# Patient Record
Sex: MALE | Race: WHITE | Employment: UNEMPLOYED | URBAN - METROPOLITAN AREA
[De-identification: names, ages, dates, MRNs, and addresses within clinical notes are randomized per-mention and may not be internally consistent; named-entity substitution may affect disease eponyms.]

---

## 2020-01-01 ENCOUNTER — APPOINTMENT (OUTPATIENT)
Dept: GENERAL RADIOLOGY | Age: 35
DRG: 720 | End: 2020-01-01
Attending: INTERNAL MEDICINE
Payer: COMMERCIAL

## 2020-01-01 ENCOUNTER — HOSPITAL ENCOUNTER (INPATIENT)
Age: 35
LOS: 10 days | DRG: 720 | End: 2020-12-12
Attending: INTERNAL MEDICINE | Admitting: INTERNAL MEDICINE
Payer: COMMERCIAL

## 2020-01-01 ENCOUNTER — APPOINTMENT (OUTPATIENT)
Dept: ULTRASOUND IMAGING | Age: 35
DRG: 720 | End: 2020-01-01
Attending: INTERNAL MEDICINE
Payer: COMMERCIAL

## 2020-01-01 VITALS
SYSTOLIC BLOOD PRESSURE: 110 MMHG | WEIGHT: 315 LBS | OXYGEN SATURATION: 58 % | BODY MASS INDEX: 46.65 KG/M2 | TEMPERATURE: 98.1 F | DIASTOLIC BLOOD PRESSURE: 53 MMHG | HEIGHT: 69 IN

## 2020-01-01 LAB
-: NORMAL
ABSOLUTE EOS #: 0.35 K/UL (ref 0–0.4)
ABSOLUTE EOS #: 0.45 K/UL (ref 0–0.44)
ABSOLUTE EOS #: 0.47 K/UL (ref 0–0.44)
ABSOLUTE EOS #: 0.49 K/UL (ref 0–0.44)
ABSOLUTE EOS #: 0.52 K/UL (ref 0–0.44)
ABSOLUTE EOS #: 0.54 K/UL (ref 0–0.44)
ABSOLUTE EOS #: 0.63 K/UL (ref 0–0.44)
ABSOLUTE EOS #: 0.66 K/UL (ref 0–0.4)
ABSOLUTE EOS #: 0.66 K/UL (ref 0–0.44)
ABSOLUTE EOS #: 0.76 K/UL (ref 0–0.4)
ABSOLUTE EOS #: 1.15 K/UL (ref 0–0.4)
ABSOLUTE IMMATURE GRANULOCYTE: 0 K/UL (ref 0–0.3)
ABSOLUTE IMMATURE GRANULOCYTE: 0.07 K/UL (ref 0–0.3)
ABSOLUTE IMMATURE GRANULOCYTE: 0.07 K/UL (ref 0–0.3)
ABSOLUTE IMMATURE GRANULOCYTE: 0.09 K/UL (ref 0–0.3)
ABSOLUTE IMMATURE GRANULOCYTE: 0.1 K/UL (ref 0–0.3)
ABSOLUTE IMMATURE GRANULOCYTE: 0.11 K/UL (ref 0–0.3)
ABSOLUTE IMMATURE GRANULOCYTE: 0.13 K/UL (ref 0–0.3)
ABSOLUTE IMMATURE GRANULOCYTE: 0.16 K/UL (ref 0–0.3)
ABSOLUTE IMMATURE GRANULOCYTE: 0.33 K/UL (ref 0–0.3)
ABSOLUTE IMMATURE GRANULOCYTE: 0.35 K/UL (ref 0–0.3)
ABSOLUTE IMMATURE GRANULOCYTE: 0.39 K/UL (ref 0–0.3)
ABSOLUTE LYMPH #: 1.02 K/UL (ref 1.1–3.7)
ABSOLUTE LYMPH #: 1.05 K/UL (ref 1.1–3.7)
ABSOLUTE LYMPH #: 1.24 K/UL (ref 1.1–3.7)
ABSOLUTE LYMPH #: 1.3 K/UL (ref 1.1–3.7)
ABSOLUTE LYMPH #: 1.38 K/UL (ref 1–4.8)
ABSOLUTE LYMPH #: 1.46 K/UL (ref 1.1–3.7)
ABSOLUTE LYMPH #: 1.47 K/UL (ref 1.1–3.7)
ABSOLUTE LYMPH #: 1.51 K/UL (ref 1–4.8)
ABSOLUTE LYMPH #: 1.57 K/UL (ref 1.1–3.7)
ABSOLUTE LYMPH #: 1.61 K/UL (ref 1–4.8)
ABSOLUTE LYMPH #: 2.75 K/UL (ref 1–4.8)
ABSOLUTE MONO #: 0.35 K/UL (ref 0.1–0.8)
ABSOLUTE MONO #: 0.71 K/UL (ref 0.1–1.2)
ABSOLUTE MONO #: 0.73 K/UL (ref 0.1–1.2)
ABSOLUTE MONO #: 0.76 K/UL (ref 0.1–0.8)
ABSOLUTE MONO #: 0.79 K/UL (ref 0.1–0.8)
ABSOLUTE MONO #: 0.79 K/UL (ref 0.1–1.2)
ABSOLUTE MONO #: 0.89 K/UL (ref 0.1–1.2)
ABSOLUTE MONO #: 0.9 K/UL (ref 0.1–1.2)
ABSOLUTE MONO #: 0.97 K/UL (ref 0.1–1.2)
ABSOLUTE MONO #: 1.04 K/UL (ref 0.1–0.8)
ABSOLUTE MONO #: 1.09 K/UL (ref 0.1–1.2)
ADENOVIRUS PCR: NOT DETECTED
ALBUMIN SERPL-MCNC: 1.5 G/DL (ref 3.5–5.2)
ALBUMIN SERPL-MCNC: 1.7 G/DL (ref 3.5–5.2)
ALBUMIN SERPL-MCNC: 1.7 G/DL (ref 3.5–5.2)
ALBUMIN SERPL-MCNC: 1.8 G/DL (ref 3.5–5.2)
ALBUMIN SERPL-MCNC: 1.9 G/DL (ref 3.5–5.2)
ALBUMIN SERPL-MCNC: 2 G/DL (ref 3.5–5.2)
ALBUMIN SERPL-MCNC: 2.1 G/DL (ref 3.5–5.2)
ALBUMIN SERPL-MCNC: 2.3 G/DL (ref 3.5–5.2)
ALBUMIN SERPL-MCNC: 2.3 G/DL (ref 3.5–5.2)
ALBUMIN SERPL-MCNC: 2.4 G/DL (ref 3.5–5.2)
ALBUMIN/GLOBULIN RATIO: 0.2 (ref 1–2.5)
ALBUMIN/GLOBULIN RATIO: 0.3 (ref 1–2.5)
ALBUMIN/GLOBULIN RATIO: 0.4 (ref 1–2.5)
ALBUMIN/GLOBULIN RATIO: 0.4 (ref 1–2.5)
ALBUMIN/GLOBULIN RATIO: 0.5 (ref 1–2.5)
ALLEN TEST: ABNORMAL
ALLEN TEST: POSITIVE
ALP BLD-CCNC: 117 U/L (ref 40–129)
ALP BLD-CCNC: 129 U/L (ref 40–129)
ALP BLD-CCNC: 84 U/L (ref 40–129)
ALP BLD-CCNC: 89 U/L (ref 40–129)
ALP BLD-CCNC: 90 U/L (ref 40–129)
ALP BLD-CCNC: 91 U/L (ref 40–129)
ALP BLD-CCNC: 93 U/L (ref 40–129)
ALP BLD-CCNC: 93 U/L (ref 40–129)
ALP BLD-CCNC: 97 U/L (ref 40–129)
ALP BLD-CCNC: 99 U/L (ref 40–129)
ALT SERPL-CCNC: 28 U/L (ref 5–41)
ALT SERPL-CCNC: 30 U/L (ref 5–41)
ALT SERPL-CCNC: 31 U/L (ref 5–41)
ALT SERPL-CCNC: 36 U/L (ref 5–41)
ALT SERPL-CCNC: 36 U/L (ref 5–41)
ALT SERPL-CCNC: 41 U/L (ref 5–41)
ALT SERPL-CCNC: 50 U/L (ref 5–41)
ALT SERPL-CCNC: 53 U/L (ref 5–41)
ALT SERPL-CCNC: 63 U/L (ref 5–41)
ALT SERPL-CCNC: 65 U/L (ref 5–41)
AMORPHOUS: NORMAL
ANION GAP SERPL CALCULATED.3IONS-SCNC: 10 MMOL/L (ref 9–17)
ANION GAP SERPL CALCULATED.3IONS-SCNC: 11 MMOL/L (ref 9–17)
ANION GAP SERPL CALCULATED.3IONS-SCNC: 12 MMOL/L (ref 9–17)
ANION GAP SERPL CALCULATED.3IONS-SCNC: 13 MMOL/L (ref 9–17)
ANION GAP SERPL CALCULATED.3IONS-SCNC: 13 MMOL/L (ref 9–17)
ANION GAP SERPL CALCULATED.3IONS-SCNC: 14 MMOL/L (ref 9–17)
ANION GAP SERPL CALCULATED.3IONS-SCNC: 7 MMOL/L (ref 9–17)
AST SERPL-CCNC: 100 U/L
AST SERPL-CCNC: 33 U/L
AST SERPL-CCNC: 37 U/L
AST SERPL-CCNC: 37 U/L
AST SERPL-CCNC: 41 U/L
AST SERPL-CCNC: 43 U/L
AST SERPL-CCNC: 43 U/L
AST SERPL-CCNC: 51 U/L
AST SERPL-CCNC: 72 U/L
AST SERPL-CCNC: 73 U/L
BACTERIA: NORMAL
BASOPHILS # BLD: 0 % (ref 0–2)
BASOPHILS # BLD: 0 % (ref 0–2)
BASOPHILS # BLD: 1 % (ref 0–2)
BASOPHILS # BLD: 2 % (ref 0–2)
BASOPHILS ABSOLUTE: 0 K/UL (ref 0–0.2)
BASOPHILS ABSOLUTE: 0 K/UL (ref 0–0.2)
BASOPHILS ABSOLUTE: 0.06 K/UL (ref 0–0.2)
BASOPHILS ABSOLUTE: 0.09 K/UL (ref 0–0.2)
BASOPHILS ABSOLUTE: 0.1 K/UL (ref 0–0.2)
BASOPHILS ABSOLUTE: 0.11 K/UL (ref 0–0.2)
BASOPHILS ABSOLUTE: 0.12 K/UL (ref 0–0.2)
BASOPHILS ABSOLUTE: 0.16 K/UL (ref 0–0.2)
BASOPHILS ABSOLUTE: 0.16 K/UL (ref 0–0.2)
BASOPHILS ABSOLUTE: 0.19 K/UL (ref 0–0.2)
BASOPHILS ABSOLUTE: 0.26 K/UL (ref 0–0.2)
BILIRUB SERPL-MCNC: 0.34 MG/DL (ref 0.3–1.2)
BILIRUB SERPL-MCNC: 0.37 MG/DL (ref 0.3–1.2)
BILIRUB SERPL-MCNC: 0.39 MG/DL (ref 0.3–1.2)
BILIRUB SERPL-MCNC: 0.46 MG/DL (ref 0.3–1.2)
BILIRUB SERPL-MCNC: 0.47 MG/DL (ref 0.3–1.2)
BILIRUB SERPL-MCNC: 0.56 MG/DL (ref 0.3–1.2)
BILIRUB SERPL-MCNC: 0.89 MG/DL (ref 0.3–1.2)
BILIRUB SERPL-MCNC: 0.95 MG/DL (ref 0.3–1.2)
BILIRUB SERPL-MCNC: 1.11 MG/DL (ref 0.3–1.2)
BILIRUB SERPL-MCNC: 1.32 MG/DL (ref 0.3–1.2)
BILIRUBIN DIRECT: 0.18 MG/DL
BILIRUBIN DIRECT: 0.2 MG/DL
BILIRUBIN DIRECT: 0.2 MG/DL
BILIRUBIN DIRECT: 0.27 MG/DL
BILIRUBIN DIRECT: 0.29 MG/DL
BILIRUBIN DIRECT: 0.33 MG/DL
BILIRUBIN DIRECT: 0.65 MG/DL
BILIRUBIN DIRECT: 0.66 MG/DL
BILIRUBIN DIRECT: 0.9 MG/DL
BILIRUBIN DIRECT: 1.12 MG/DL
BILIRUBIN URINE: NEGATIVE
BILIRUBIN, INDIRECT: 0.14 MG/DL (ref 0–1)
BILIRUBIN, INDIRECT: 0.17 MG/DL (ref 0–1)
BILIRUBIN, INDIRECT: 0.19 MG/DL (ref 0–1)
BILIRUBIN, INDIRECT: 0.19 MG/DL (ref 0–1)
BILIRUBIN, INDIRECT: 0.2 MG/DL (ref 0–1)
BILIRUBIN, INDIRECT: 0.2 MG/DL (ref 0–1)
BILIRUBIN, INDIRECT: 0.21 MG/DL (ref 0–1)
BILIRUBIN, INDIRECT: 0.23 MG/DL (ref 0–1)
BILIRUBIN, INDIRECT: 0.23 MG/DL (ref 0–1)
BILIRUBIN, INDIRECT: 0.3 MG/DL (ref 0–1)
BNP INTERPRETATION: ABNORMAL
BORDETELLA PARAPERTUSSIS: NOT DETECTED
BORDETELLA PERTUSSIS PCR: NOT DETECTED
BUN BLDV-MCNC: 30 MG/DL (ref 6–20)
BUN BLDV-MCNC: 39 MG/DL (ref 6–20)
BUN BLDV-MCNC: 41 MG/DL (ref 6–20)
BUN BLDV-MCNC: 42 MG/DL (ref 6–20)
BUN BLDV-MCNC: 43 MG/DL (ref 6–20)
BUN BLDV-MCNC: 53 MG/DL (ref 6–20)
BUN BLDV-MCNC: 63 MG/DL (ref 6–20)
BUN BLDV-MCNC: 74 MG/DL (ref 6–20)
BUN BLDV-MCNC: 81 MG/DL (ref 6–20)
BUN BLDV-MCNC: 82 MG/DL (ref 6–20)
BUN BLDV-MCNC: 90 MG/DL (ref 6–20)
BUN/CREAT BLD: ABNORMAL (ref 9–20)
C-REACTIVE PROTEIN: 398.4 MG/L (ref 0–5)
C-REACTIVE PROTEIN: 474.2 MG/L (ref 0–5)
C-REACTIVE PROTEIN: 510 MG/L (ref 0–5)
CALCIUM SERPL-MCNC: 8.1 MG/DL (ref 8.6–10.4)
CALCIUM SERPL-MCNC: 8.2 MG/DL (ref 8.6–10.4)
CALCIUM SERPL-MCNC: 8.3 MG/DL (ref 8.6–10.4)
CALCIUM SERPL-MCNC: 8.4 MG/DL (ref 8.6–10.4)
CALCIUM SERPL-MCNC: 8.6 MG/DL (ref 8.6–10.4)
CALCIUM SERPL-MCNC: 8.7 MG/DL (ref 8.6–10.4)
CALCIUM SERPL-MCNC: 8.7 MG/DL (ref 8.6–10.4)
CARBOXYHEMOGLOBIN: 0.8 % (ref 0–5)
CASTS UA: NORMAL /LPF (ref 0–8)
CHLAMYDIA PNEUMONIAE BY PCR: NOT DETECTED
CHLORIDE BLD-SCNC: 100 MMOL/L (ref 98–107)
CHLORIDE BLD-SCNC: 101 MMOL/L (ref 98–107)
CHLORIDE BLD-SCNC: 103 MMOL/L (ref 98–107)
CHLORIDE BLD-SCNC: 104 MMOL/L (ref 98–107)
CHLORIDE BLD-SCNC: 105 MMOL/L (ref 98–107)
CHLORIDE BLD-SCNC: 107 MMOL/L (ref 98–107)
CHLORIDE BLD-SCNC: 108 MMOL/L (ref 98–107)
CHLORIDE BLD-SCNC: 109 MMOL/L (ref 98–107)
CHLORIDE BLD-SCNC: 98 MMOL/L (ref 98–107)
CHLORIDE BLD-SCNC: 99 MMOL/L (ref 98–107)
CHLORIDE BLD-SCNC: 99 MMOL/L (ref 98–107)
CO2: 25 MMOL/L (ref 20–31)
CO2: 28 MMOL/L (ref 20–31)
CO2: 29 MMOL/L (ref 20–31)
CO2: 29 MMOL/L (ref 20–31)
CO2: 30 MMOL/L (ref 20–31)
CO2: 32 MMOL/L (ref 20–31)
COLOR: ABNORMAL
COMMENT UA: ABNORMAL
CORONAVIRUS 229E PCR: NOT DETECTED
CORONAVIRUS HKU1 PCR: NOT DETECTED
CORONAVIRUS NL63 PCR: NOT DETECTED
CORONAVIRUS OC43 PCR: NOT DETECTED
CREAT SERPL-MCNC: 1.32 MG/DL (ref 0.7–1.2)
CREAT SERPL-MCNC: 1.37 MG/DL (ref 0.7–1.2)
CREAT SERPL-MCNC: 1.41 MG/DL (ref 0.7–1.2)
CREAT SERPL-MCNC: 1.51 MG/DL (ref 0.7–1.2)
CREAT SERPL-MCNC: 1.55 MG/DL (ref 0.7–1.2)
CREAT SERPL-MCNC: 1.61 MG/DL (ref 0.7–1.2)
CREAT SERPL-MCNC: 1.98 MG/DL (ref 0.7–1.2)
CREAT SERPL-MCNC: 2.04 MG/DL (ref 0.7–1.2)
CREAT SERPL-MCNC: 2.07 MG/DL (ref 0.7–1.2)
CREAT SERPL-MCNC: 2.07 MG/DL (ref 0.7–1.2)
CREAT SERPL-MCNC: 2.14 MG/DL (ref 0.7–1.2)
CREATININE URINE: 126.7 MG/DL (ref 39–259)
CRYSTALS, UA: NORMAL /HPF
CULTURE: ABNORMAL
CULTURE: NORMAL
DIFFERENTIAL TYPE: ABNORMAL
DIRECT EXAM: NORMAL
EKG ATRIAL RATE: 108 BPM
EKG P AXIS: 30 DEGREES
EKG P-R INTERVAL: 152 MS
EKG Q-T INTERVAL: 356 MS
EKG QRS DURATION: 104 MS
EKG QTC CALCULATION (BAZETT): 477 MS
EKG R AXIS: 37 DEGREES
EKG T AXIS: 16 DEGREES
EKG VENTRICULAR RATE: 108 BPM
EOSINOPHILS RELATIVE PERCENT: 10 % (ref 1–4)
EOSINOPHILS RELATIVE PERCENT: 3 % (ref 1–4)
EOSINOPHILS RELATIVE PERCENT: 3 % (ref 1–4)
EOSINOPHILS RELATIVE PERCENT: 4 % (ref 1–4)
EOSINOPHILS RELATIVE PERCENT: 4 % (ref 1–4)
EOSINOPHILS RELATIVE PERCENT: 5 % (ref 1–4)
EOSINOPHILS RELATIVE PERCENT: 7 % (ref 1–4)
EPITHELIAL CELLS UA: NORMAL /HPF (ref 0–5)
FIO2: 100
FIO2: 40
FIO2: 40
FIO2: 70
FIO2: 70
FIO2: ABNORMAL
GFR AFRICAN AMERICAN: 43 ML/MIN
GFR AFRICAN AMERICAN: 45 ML/MIN
GFR AFRICAN AMERICAN: 45 ML/MIN
GFR AFRICAN AMERICAN: 46 ML/MIN
GFR AFRICAN AMERICAN: 47 ML/MIN
GFR AFRICAN AMERICAN: >60 ML/MIN
GFR NON-AFRICAN AMERICAN: 36 ML/MIN
GFR NON-AFRICAN AMERICAN: 37 ML/MIN
GFR NON-AFRICAN AMERICAN: 37 ML/MIN
GFR NON-AFRICAN AMERICAN: 38 ML/MIN
GFR NON-AFRICAN AMERICAN: 39 ML/MIN
GFR NON-AFRICAN AMERICAN: 50 ML/MIN
GFR NON-AFRICAN AMERICAN: 52 ML/MIN
GFR NON-AFRICAN AMERICAN: 53 ML/MIN
GFR NON-AFRICAN AMERICAN: 58 ML/MIN
GFR NON-AFRICAN AMERICAN: 60 ML/MIN
GFR NON-AFRICAN AMERICAN: >60 ML/MIN
GFR SERPL CREATININE-BSD FRML MDRD: ABNORMAL ML/MIN/{1.73_M2}
GLOBULIN: ABNORMAL G/DL (ref 1.5–3.8)
GLUCOSE BLD-MCNC: 113 MG/DL (ref 70–99)
GLUCOSE BLD-MCNC: 118 MG/DL (ref 70–99)
GLUCOSE BLD-MCNC: 119 MG/DL (ref 74–100)
GLUCOSE BLD-MCNC: 123 MG/DL (ref 74–100)
GLUCOSE BLD-MCNC: 123 MG/DL (ref 75–110)
GLUCOSE BLD-MCNC: 124 MG/DL (ref 70–99)
GLUCOSE BLD-MCNC: 132 MG/DL (ref 74–100)
GLUCOSE BLD-MCNC: 137 MG/DL (ref 70–99)
GLUCOSE BLD-MCNC: 139 MG/DL (ref 70–99)
GLUCOSE BLD-MCNC: 143 MG/DL (ref 70–99)
GLUCOSE BLD-MCNC: 145 MG/DL (ref 70–99)
GLUCOSE BLD-MCNC: 149 MG/DL (ref 70–99)
GLUCOSE BLD-MCNC: 151 MG/DL (ref 70–99)
GLUCOSE BLD-MCNC: 152 MG/DL (ref 74–100)
GLUCOSE BLD-MCNC: 155 MG/DL (ref 70–99)
GLUCOSE BLD-MCNC: 160 MG/DL (ref 74–100)
GLUCOSE BLD-MCNC: 162 MG/DL (ref 74–100)
GLUCOSE BLD-MCNC: 85 MG/DL (ref 75–110)
GLUCOSE BLD-MCNC: 97 MG/DL (ref 70–99)
GLUCOSE URINE: NEGATIVE
HAV IGM SER IA-ACNC: NONREACTIVE
HCO3 VENOUS: 32.3 MMOL/L (ref 24–30)
HCO3 VENOUS: 35.9 MMOL/L (ref 22–29)
HCO3, MIXED: 34.5 MMOL/L (ref 23–29)
HCT VFR BLD CALC: 28.9 % (ref 40.7–50.3)
HCT VFR BLD CALC: 31.4 % (ref 40.7–50.3)
HCT VFR BLD CALC: 31.7 % (ref 40.7–50.3)
HCT VFR BLD CALC: 31.8 % (ref 40.7–50.3)
HCT VFR BLD CALC: 31.8 % (ref 40.7–50.3)
HCT VFR BLD CALC: 32 % (ref 40.7–50.3)
HCT VFR BLD CALC: 34.1 % (ref 40.7–50.3)
HCT VFR BLD CALC: 35.9 % (ref 40.7–50.3)
HCT VFR BLD CALC: 37.9 % (ref 40.7–50.3)
HCT VFR BLD CALC: 39 % (ref 40.7–50.3)
HCT VFR BLD CALC: 39.1 % (ref 40.7–50.3)
HCT VFR BLD CALC: 39.3 % (ref 40.7–50.3)
HEMOGLOBIN: 10.4 G/DL (ref 13–17)
HEMOGLOBIN: 11 G/DL (ref 13–17)
HEMOGLOBIN: 11 G/DL (ref 13–17)
HEMOGLOBIN: 11.4 G/DL (ref 13–17)
HEMOGLOBIN: 11.7 G/DL (ref 13–17)
HEMOGLOBIN: 8.5 G/DL (ref 13–17)
HEMOGLOBIN: 9.1 G/DL (ref 13–17)
HEMOGLOBIN: 9.2 G/DL (ref 13–17)
HEMOGLOBIN: 9.4 G/DL (ref 13–17)
HEMOGLOBIN: 9.5 G/DL (ref 13–17)
HEMOGLOBIN: 9.5 G/DL (ref 13–17)
HEMOGLOBIN: 9.7 G/DL (ref 13–17)
HEPATITIS B CORE IGM ANTIBODY: NONREACTIVE
HEPATITIS B SURFACE ANTIGEN: NONREACTIVE
HEPATITIS C ANTIBODY: NONREACTIVE
HUMAN METAPNEUMOVIRUS PCR: NOT DETECTED
IMMATURE GRANULOCYTES: 0 %
IMMATURE GRANULOCYTES: 1 %
IMMATURE GRANULOCYTES: 3 %
INFLUENZA A BY PCR: NOT DETECTED
INFLUENZA A H1 (2009) PCR: NORMAL
INFLUENZA A H1 PCR: NORMAL
INFLUENZA A H3 PCR: NORMAL
INFLUENZA B BY PCR: NOT DETECTED
KETONES, URINE: ABNORMAL
LACTIC ACID, WHOLE BLOOD: 0.8 MMOL/L (ref 0.7–2.1)
LACTIC ACID, WHOLE BLOOD: 0.9 MMOL/L (ref 0.7–2.1)
LACTIC ACID, WHOLE BLOOD: 1 MMOL/L (ref 0.7–2.1)
LACTIC ACID, WHOLE BLOOD: 1.1 MMOL/L (ref 0.7–2.1)
LACTIC ACID, WHOLE BLOOD: 1.2 MMOL/L (ref 0.7–2.1)
LACTIC ACID, WHOLE BLOOD: 1.2 MMOL/L (ref 0.7–2.1)
LACTIC ACID, WHOLE BLOOD: 1.5 MMOL/L (ref 0.7–2.1)
LACTIC ACID, WHOLE BLOOD: 1.6 MMOL/L (ref 0.7–2.1)
LACTIC ACID: NORMAL MMOL/L
LEGIONELLA PNEUMOPHILIA AG, URINE: NEGATIVE
LEUKOCYTE ESTERASE, URINE: ABNORMAL
LV EF: 45 %
LVEF MODALITY: NORMAL
LYMPHOCYTES # BLD: 10 % (ref 24–43)
LYMPHOCYTES # BLD: 11 % (ref 24–43)
LYMPHOCYTES # BLD: 11 % (ref 24–43)
LYMPHOCYTES # BLD: 12 % (ref 24–43)
LYMPHOCYTES # BLD: 12 % (ref 24–44)
LYMPHOCYTES # BLD: 13 % (ref 24–43)
LYMPHOCYTES # BLD: 14 % (ref 24–44)
LYMPHOCYTES # BLD: 14 % (ref 24–44)
LYMPHOCYTES # BLD: 21 % (ref 24–44)
LYMPHOCYTES # BLD: 9 % (ref 24–43)
LYMPHOCYTES # BLD: 9 % (ref 24–43)
Lab: ABNORMAL
Lab: ABNORMAL
Lab: NORMAL
MCH RBC QN AUTO: 28.1 PG (ref 25.2–33.5)
MCH RBC QN AUTO: 28.3 PG (ref 25.2–33.5)
MCH RBC QN AUTO: 28.3 PG (ref 25.2–33.5)
MCH RBC QN AUTO: 28.6 PG (ref 25.2–33.5)
MCH RBC QN AUTO: 28.7 PG (ref 25.2–33.5)
MCH RBC QN AUTO: 28.8 PG (ref 25.2–33.5)
MCH RBC QN AUTO: 28.9 PG (ref 25.2–33.5)
MCH RBC QN AUTO: 29 PG (ref 25.2–33.5)
MCH RBC QN AUTO: 29.2 PG (ref 25.2–33.5)
MCH RBC QN AUTO: 29.3 PG (ref 25.2–33.5)
MCHC RBC AUTO-ENTMCNC: 28 G/DL (ref 28.4–34.8)
MCHC RBC AUTO-ENTMCNC: 28.4 G/DL (ref 28.4–34.8)
MCHC RBC AUTO-ENTMCNC: 28.7 G/DL (ref 28.4–34.8)
MCHC RBC AUTO-ENTMCNC: 28.9 G/DL (ref 28.4–34.8)
MCHC RBC AUTO-ENTMCNC: 29 G/DL (ref 28.4–34.8)
MCHC RBC AUTO-ENTMCNC: 29 G/DL (ref 28.4–34.8)
MCHC RBC AUTO-ENTMCNC: 29.2 G/DL (ref 28.4–34.8)
MCHC RBC AUTO-ENTMCNC: 29.4 G/DL (ref 28.4–34.8)
MCHC RBC AUTO-ENTMCNC: 29.6 G/DL (ref 28.4–34.8)
MCHC RBC AUTO-ENTMCNC: 29.7 G/DL (ref 28.4–34.8)
MCHC RBC AUTO-ENTMCNC: 29.9 G/DL (ref 28.4–34.8)
MCHC RBC AUTO-ENTMCNC: 30.3 G/DL (ref 28.4–34.8)
MCV RBC AUTO: 103.1 FL (ref 82.6–102.9)
MCV RBC AUTO: 94.4 FL (ref 82.6–102.9)
MCV RBC AUTO: 94.6 FL (ref 82.6–102.9)
MCV RBC AUTO: 98 FL (ref 82.6–102.9)
MCV RBC AUTO: 98.5 FL (ref 82.6–102.9)
MCV RBC AUTO: 98.6 FL (ref 82.6–102.9)
MCV RBC AUTO: 98.7 FL (ref 82.6–102.9)
MCV RBC AUTO: 98.8 FL (ref 82.6–102.9)
MCV RBC AUTO: 98.8 FL (ref 82.6–102.9)
MCV RBC AUTO: 99.1 FL (ref 82.6–102.9)
MCV RBC AUTO: 99.1 FL (ref 82.6–102.9)
MCV RBC AUTO: 99.4 FL (ref 82.6–102.9)
METHEMOGLOBIN: ABNORMAL % (ref 0–1.5)
MODE: ABNORMAL
MONOCYTES # BLD: 10 % (ref 3–12)
MONOCYTES # BLD: 3 % (ref 1–7)
MONOCYTES # BLD: 6 % (ref 1–7)
MONOCYTES # BLD: 6 % (ref 3–12)
MONOCYTES # BLD: 6 % (ref 3–12)
MONOCYTES # BLD: 7 % (ref 1–7)
MONOCYTES # BLD: 7 % (ref 3–12)
MONOCYTES # BLD: 8 % (ref 3–12)
MONOCYTES # BLD: 9 % (ref 1–7)
MORPHOLOGY: ABNORMAL
MRSA, DNA, NASAL: NORMAL
MUCUS: NORMAL
MYCOPLASMA PNEUMONIAE IGM: 0.05
MYCOPLASMA PNEUMONIAE PCR: NOT DETECTED
NEGATIVE BASE EXCESS, ART: ABNORMAL (ref 0–2)
NEGATIVE BASE EXCESS, MIXED: ABNORMAL (ref 0–2)
NEGATIVE BASE EXCESS, VEN: ABNORMAL (ref 0–2)
NEGATIVE BASE EXCESS, VEN: ABNORMAL MMOL/L (ref 0–2)
NITRITE, URINE: NEGATIVE
NOTIFICATION TIME: ABNORMAL
NOTIFICATION: ABNORMAL
NRBC AUTOMATED: 0 PER 100 WBC
NRBC AUTOMATED: 0.1 PER 100 WBC
NRBC AUTOMATED: 0.2 PER 100 WBC
NRBC AUTOMATED: 0.3 PER 100 WBC
NRBC AUTOMATED: 0.4 PER 100 WBC
NRBC AUTOMATED: 0.5 PER 100 WBC
NRBC AUTOMATED: 0.7 PER 100 WBC
NRBC AUTOMATED: 1.8 PER 100 WBC
NUCLEATED RED BLOOD CELLS: 1 PER 100 WBC
O2 DEVICE/FLOW/%: ABNORMAL
O2 SAT, MIXED: 87 % (ref 60–80)
O2 SAT, VEN: 70 % (ref 60–85)
O2 SAT, VEN: 99.3 % (ref 60–85)
OSMOLALITY URINE: 408 MOSM/KG (ref 80–1300)
OTHER OBSERVATIONS UA: NORMAL
OXYHEMOGLOBIN: ABNORMAL % (ref 95–98)
PARAINFLUENZA 1 PCR: NOT DETECTED
PARAINFLUENZA 2 PCR: NOT DETECTED
PARAINFLUENZA 3 PCR: NOT DETECTED
PARAINFLUENZA 4 PCR: NOT DETECTED
PARTIAL THROMBOPLASTIN TIME: 111.7 SEC (ref 20.5–30.5)
PARTIAL THROMBOPLASTIN TIME: 24.4 SEC (ref 20.5–30.5)
PARTIAL THROMBOPLASTIN TIME: 53 SEC (ref 20.5–30.5)
PARTIAL THROMBOPLASTIN TIME: 58.6 SEC (ref 20.5–30.5)
PARTIAL THROMBOPLASTIN TIME: 59.4 SEC (ref 20.5–30.5)
PARTIAL THROMBOPLASTIN TIME: 62.4 SEC (ref 20.5–30.5)
PARTIAL THROMBOPLASTIN TIME: 65.7 SEC (ref 20.5–30.5)
PARTIAL THROMBOPLASTIN TIME: 75 SEC (ref 20.5–30.5)
PARTIAL THROMBOPLASTIN TIME: 78.4 SEC (ref 20.5–30.5)
PARTIAL THROMBOPLASTIN TIME: 79.1 SEC (ref 20.5–30.5)
PARTIAL THROMBOPLASTIN TIME: 82.7 SEC (ref 20.5–30.5)
PARTIAL THROMBOPLASTIN TIME: 95.8 SEC (ref 20.5–30.5)
PATIENT TEMP: 36.8
PATIENT TEMP: 37
PATIENT TEMP: ABNORMAL
PCO2 MIXED: 54.8 MM HG (ref 42–52)
PCO2, VEN, TEMP ADJ: ABNORMAL MMHG (ref 39–55)
PCO2, VEN: 47.3 (ref 39–55)
PCO2, VEN: 62 MM HG (ref 41–51)
PDW BLD-RTO: 15.1 % (ref 11.8–14.4)
PDW BLD-RTO: 15.5 % (ref 11.8–14.4)
PDW BLD-RTO: 15.6 % (ref 11.8–14.4)
PDW BLD-RTO: 15.7 % (ref 11.8–14.4)
PDW BLD-RTO: 15.7 % (ref 11.8–14.4)
PDW BLD-RTO: 15.8 % (ref 11.8–14.4)
PDW BLD-RTO: 15.9 % (ref 11.8–14.4)
PEEP/CPAP: ABNORMAL
PH UA: 5 (ref 5–8)
PH VENOUS: 7.37 (ref 7.32–7.43)
PH VENOUS: 7.45 (ref 7.32–7.42)
PH, MIXED: 7.41 (ref 7.31–7.41)
PH, VEN, TEMP ADJ: ABNORMAL (ref 7.32–7.42)
PLATELET # BLD: 207 K/UL (ref 138–453)
PLATELET # BLD: 223 K/UL (ref 138–453)
PLATELET # BLD: 227 K/UL (ref 138–453)
PLATELET # BLD: 255 K/UL (ref 138–453)
PLATELET # BLD: 259 K/UL (ref 138–453)
PLATELET # BLD: 271 K/UL (ref 138–453)
PLATELET # BLD: 274 K/UL (ref 138–453)
PLATELET # BLD: 329 K/UL (ref 138–453)
PLATELET # BLD: 350 K/UL (ref 138–453)
PLATELET # BLD: 374 K/UL (ref 138–453)
PLATELET # BLD: ABNORMAL K/UL (ref 138–453)
PLATELET # BLD: ABNORMAL K/UL (ref 138–453)
PLATELET ESTIMATE: ABNORMAL
PLATELET, FLUORESCENCE: 189 K/UL (ref 138–453)
PLATELET, FLUORESCENCE: NORMAL K/UL (ref 138–453)
PLATELET, IMMATURE FRACTION: 5.6 % (ref 1.1–10.3)
PLATELET, IMMATURE FRACTION: NORMAL % (ref 1.1–10.3)
PMV BLD AUTO: 10.3 FL (ref 8.1–13.5)
PMV BLD AUTO: 10.5 FL (ref 8.1–13.5)
PMV BLD AUTO: 10.5 FL (ref 8.1–13.5)
PMV BLD AUTO: 10.7 FL (ref 8.1–13.5)
PMV BLD AUTO: 10.9 FL (ref 8.1–13.5)
PMV BLD AUTO: 11.3 FL (ref 8.1–13.5)
PMV BLD AUTO: 11.8 FL (ref 8.1–13.5)
PMV BLD AUTO: 12 FL (ref 8.1–13.5)
PMV BLD AUTO: 12.5 FL (ref 8.1–13.5)
PMV BLD AUTO: 12.8 FL (ref 8.1–13.5)
PMV BLD AUTO: ABNORMAL FL (ref 8.1–13.5)
PMV BLD AUTO: ABNORMAL FL (ref 8.1–13.5)
PO2 MIXED: 54.1 MM HG (ref 35–45)
PO2, VEN, TEMP ADJ: ABNORMAL MMHG (ref 30–50)
PO2, VEN: 132 (ref 30–50)
PO2, VEN: 39 MM HG (ref 30–50)
POC HCO3: 31.4 MMOL/L (ref 21–28)
POC HCO3: 31.5 MMOL/L (ref 21–28)
POC HCO3: 31.8 MMOL/L (ref 21–28)
POC HCO3: 31.9 MMOL/L (ref 21–28)
POC HCO3: 32.7 MMOL/L (ref 21–28)
POC HCO3: 32.9 MMOL/L (ref 21–28)
POC HCO3: 33.1 MMOL/L (ref 21–28)
POC HCO3: 33.1 MMOL/L (ref 21–28)
POC HCO3: 33.5 MMOL/L (ref 21–28)
POC HCO3: 33.7 MMOL/L (ref 21–28)
POC HCO3: 33.9 MMOL/L (ref 21–28)
POC HCO3: 34 MMOL/L (ref 21–28)
POC HCO3: 34.1 MMOL/L (ref 21–28)
POC HCO3: 34.6 MMOL/L (ref 21–28)
POC LACTIC ACID: 1.45 MMOL/L (ref 0.56–1.39)
POC O2 SATURATION: 82 % (ref 94–98)
POC O2 SATURATION: 87 % (ref 94–98)
POC O2 SATURATION: 87 % (ref 94–98)
POC O2 SATURATION: 88 % (ref 94–98)
POC O2 SATURATION: 89 % (ref 94–98)
POC O2 SATURATION: 90 % (ref 94–98)
POC O2 SATURATION: 91 % (ref 94–98)
POC O2 SATURATION: 91 % (ref 94–98)
POC O2 SATURATION: 95 % (ref 94–98)
POC O2 SATURATION: 96 % (ref 94–98)
POC O2 SATURATION: 97 % (ref 94–98)
POC O2 SATURATION: 99 % (ref 94–98)
POC PCO2 TEMP: ABNORMAL MM HG
POC PCO2: 48.2 MM HG (ref 35–48)
POC PCO2: 50.1 MM HG (ref 35–48)
POC PCO2: 50.9 MM HG (ref 35–48)
POC PCO2: 51.7 MM HG (ref 35–48)
POC PCO2: 53.1 MM HG (ref 35–48)
POC PCO2: 53.8 MM HG (ref 35–48)
POC PCO2: 55.5 MM HG (ref 35–48)
POC PCO2: 56.6 MM HG (ref 35–48)
POC PCO2: 58.6 MM HG (ref 35–48)
POC PCO2: 61.5 MM HG (ref 35–48)
POC PCO2: 62.2 MM HG (ref 35–48)
POC PCO2: 63.9 MM HG (ref 35–48)
POC PCO2: 64 MM HG (ref 35–48)
POC PCO2: 68 MM HG (ref 35–48)
POC PH TEMP: ABNORMAL
POC PH: 7.3 (ref 7.35–7.45)
POC PH: 7.33 (ref 7.35–7.45)
POC PH: 7.34 (ref 7.35–7.45)
POC PH: 7.35 (ref 7.35–7.45)
POC PH: 7.38 (ref 7.35–7.45)
POC PH: 7.38 (ref 7.35–7.45)
POC PH: 7.4 (ref 7.35–7.45)
POC PH: 7.4 (ref 7.35–7.45)
POC PH: 7.41 (ref 7.35–7.45)
POC PH: 7.42 (ref 7.35–7.45)
POC PH: 7.43 (ref 7.35–7.45)
POC PH: 7.43 (ref 7.35–7.45)
POC PO2 TEMP: ABNORMAL MM HG
POC PO2: 174.4 MM HG (ref 83–108)
POC PO2: 51.8 MM HG (ref 83–108)
POC PO2: 52.9 MM HG (ref 83–108)
POC PO2: 54.9 MM HG (ref 83–108)
POC PO2: 55.7 MM HG (ref 83–108)
POC PO2: 57.3 MM HG (ref 83–108)
POC PO2: 57.8 MM HG (ref 83–108)
POC PO2: 58.4 MM HG (ref 83–108)
POC PO2: 60.4 MM HG (ref 83–108)
POC PO2: 66.6 MM HG (ref 83–108)
POC PO2: 70.9 MM HG (ref 83–108)
POC PO2: 78.1 MM HG (ref 83–108)
POC PO2: 87.8 MM HG (ref 83–108)
POC PO2: 91.1 MM HG (ref 83–108)
POSITIVE BASE EXCESS, ART: 4 (ref 0–3)
POSITIVE BASE EXCESS, ART: 4 (ref 0–3)
POSITIVE BASE EXCESS, ART: 5 (ref 0–3)
POSITIVE BASE EXCESS, ART: 5 (ref 0–3)
POSITIVE BASE EXCESS, ART: 6 (ref 0–3)
POSITIVE BASE EXCESS, ART: 7 (ref 0–3)
POSITIVE BASE EXCESS, ART: 8 (ref 0–3)
POSITIVE BASE EXCESS, ART: 9 (ref 0–3)
POSITIVE BASE EXCESS, MIXED: 8 (ref 0–3)
POSITIVE BASE EXCESS, VEN: 7.6 MMOL/L (ref 0–2)
POSITIVE BASE EXCESS, VEN: 8 (ref 0–3)
POTASSIUM SERPL-SCNC: 3.9 MMOL/L (ref 3.7–5.3)
POTASSIUM SERPL-SCNC: 4 MMOL/L (ref 3.7–5.3)
POTASSIUM SERPL-SCNC: 4.1 MMOL/L (ref 3.7–5.3)
POTASSIUM SERPL-SCNC: 4.5 MMOL/L (ref 3.7–5.3)
POTASSIUM SERPL-SCNC: 4.7 MMOL/L (ref 3.7–5.3)
POTASSIUM SERPL-SCNC: 4.9 MMOL/L (ref 3.7–5.3)
PRO-BNP: 4006 PG/ML
PROTEIN UA: ABNORMAL
PSV: ABNORMAL
PT. POSITION: ABNORMAL
RBC # BLD: 2.93 M/UL (ref 4.21–5.77)
RBC # BLD: 3.21 M/UL (ref 4.21–5.77)
RBC # BLD: 3.25 M/UL (ref 4.21–5.77)
RBC # BLD: 3.32 M/UL (ref 4.21–5.77)
RBC # BLD: 3.45 M/UL (ref 4.21–5.77)
RBC # BLD: 3.61 M/UL (ref 4.21–5.77)
RBC # BLD: 3.81 M/UL (ref 4.21–5.77)
RBC # BLD: 3.84 M/UL (ref 4.21–5.77)
RBC # BLD: 3.98 M/UL (ref 4.21–5.77)
RBC # BLD: 4.14 M/UL (ref 4.21–5.77)
RBC # BLD: ABNORMAL 10*6/UL
RBC UA: NORMAL /HPF (ref 0–4)
REASON FOR REJECTION: NORMAL
REASON FOR REJECTION: NORMAL
RENAL EPITHELIAL, UA: NORMAL /HPF
RESP SYNCYTIAL VIRUS PCR: NOT DETECTED
RESPIRATORY RATE: ABNORMAL
RHINO/ENTEROVIRUS PCR: NOT DETECTED
SAMPLE SITE: ABNORMAL
SARS-COV-2, PCR: NOT DETECTED
SARS-COV-2, RAPID: NORMAL
SARS-COV-2, RAPID: NOT DETECTED
SARS-COV-2: NORMAL
SARS-COV-2: NOT DETECTED
SEG NEUTROPHILS: 63 % (ref 36–66)
SEG NEUTROPHILS: 71 % (ref 36–66)
SEG NEUTROPHILS: 71 % (ref 36–66)
SEG NEUTROPHILS: 73 % (ref 36–65)
SEG NEUTROPHILS: 74 % (ref 36–65)
SEG NEUTROPHILS: 74 % (ref 36–66)
SEG NEUTROPHILS: 76 % (ref 36–65)
SEG NEUTROPHILS: 76 % (ref 36–65)
SEG NEUTROPHILS: 77 % (ref 36–65)
SEG NEUTROPHILS: 77 % (ref 36–65)
SEG NEUTROPHILS: 79 % (ref 36–65)
SEGMENTED NEUTROPHILS ABSOLUTE COUNT: 10.58 K/UL (ref 1.5–8.1)
SEGMENTED NEUTROPHILS ABSOLUTE COUNT: 10.9 K/UL (ref 1.5–8.1)
SEGMENTED NEUTROPHILS ABSOLUTE COUNT: 7.32 K/UL (ref 1.5–8.1)
SEGMENTED NEUTROPHILS ABSOLUTE COUNT: 7.66 K/UL (ref 1.8–7.7)
SEGMENTED NEUTROPHILS ABSOLUTE COUNT: 8.15 K/UL (ref 1.8–7.7)
SEGMENTED NEUTROPHILS ABSOLUTE COUNT: 8.25 K/UL (ref 1.8–7.7)
SEGMENTED NEUTROPHILS ABSOLUTE COUNT: 8.5 K/UL (ref 1.8–7.7)
SEGMENTED NEUTROPHILS ABSOLUTE COUNT: 8.65 K/UL (ref 1.5–8.1)
SEGMENTED NEUTROPHILS ABSOLUTE COUNT: 9.35 K/UL (ref 1.5–8.1)
SEGMENTED NEUTROPHILS ABSOLUTE COUNT: 9.44 K/UL (ref 1.5–8.1)
SEGMENTED NEUTROPHILS ABSOLUTE COUNT: 9.58 K/UL (ref 1.5–8.1)
SERUM OSMOLALITY: 313 MOSM/KG (ref 275–295)
SET RATE: ABNORMAL
SODIUM BLD-SCNC: 138 MMOL/L (ref 135–144)
SODIUM BLD-SCNC: 139 MMOL/L (ref 135–144)
SODIUM BLD-SCNC: 142 MMOL/L (ref 135–144)
SODIUM BLD-SCNC: 143 MMOL/L (ref 135–144)
SODIUM BLD-SCNC: 144 MMOL/L (ref 135–144)
SODIUM BLD-SCNC: 147 MMOL/L (ref 135–144)
SODIUM BLD-SCNC: 147 MMOL/L (ref 135–144)
SODIUM BLD-SCNC: 148 MMOL/L (ref 135–144)
SODIUM BLD-SCNC: 148 MMOL/L (ref 135–144)
SODIUM,UR: 20 MMOL/L
SOURCE: NORMAL
SOURCE: NORMAL
SPECIFIC GRAVITY UA: 1.02 (ref 1–1.03)
SPECIMEN DESCRIPTION: ABNORMAL
SPECIMEN DESCRIPTION: ABNORMAL
SPECIMEN DESCRIPTION: NORMAL
TCO2 (CALC), ART: 33 MMOL/L (ref 22–29)
TCO2 (CALC), ART: 34 MMOL/L (ref 22–29)
TCO2 (CALC), ART: 35 MMOL/L (ref 22–29)
TCO2 (CALC), ART: 36 MMOL/L (ref 22–29)
TCO2 CALC MIXED: 36 MMOL/L (ref 24–30)
TEXT FOR RESPIRATORY: ABNORMAL
TOTAL CO2, VENOUS: 38 MMOL/L (ref 23–30)
TOTAL HB: ABNORMAL G/DL (ref 12–16)
TOTAL PROTEIN: 6.7 G/DL (ref 6.4–8.3)
TOTAL PROTEIN: 7.1 G/DL (ref 6.4–8.3)
TOTAL PROTEIN: 7.6 G/DL (ref 6.4–8.3)
TOTAL PROTEIN: 7.7 G/DL (ref 6.4–8.3)
TOTAL PROTEIN: 7.9 G/DL (ref 6.4–8.3)
TOTAL PROTEIN: 8 G/DL (ref 6.4–8.3)
TOTAL RATE: ABNORMAL
TRICHOMONAS: NORMAL
TRIGL SERPL-MCNC: 104 MG/DL
TRIGL SERPL-MCNC: 141 MG/DL
TROPONIN INTERP: ABNORMAL
TROPONIN T: ABNORMAL NG/ML
TROPONIN, HIGH SENSITIVITY: 37 NG/L (ref 0–22)
TROPONIN, HIGH SENSITIVITY: 47 NG/L (ref 0–22)
TROPONIN, HIGH SENSITIVITY: 50 NG/L (ref 0–22)
TROPONIN, HIGH SENSITIVITY: 51 NG/L (ref 0–22)
TROPONIN, HIGH SENSITIVITY: 54 NG/L (ref 0–22)
TURBIDITY: ABNORMAL
UREA NITROGEN, UR: 829 MG/DL
URINE HGB: NEGATIVE
UROBILINOGEN, URINE: NORMAL
VANCOMYCIN RANDOM DATE LAST DOSE: NORMAL
VANCOMYCIN RANDOM DOSE AMOUNT: NORMAL
VANCOMYCIN RANDOM TIME LAST DOSE: NORMAL
VANCOMYCIN RANDOM: 12.4 UG/ML
VANCOMYCIN RANDOM: 13.2 UG/ML
VANCOMYCIN RANDOM: 20.4 UG/ML
VANCOMYCIN RANDOM: 24.5 UG/ML
VANCOMYCIN RANDOM: 40.4 UG/ML
VANCOMYCIN TROUGH DATE LAST DOSE: ABNORMAL
VANCOMYCIN TROUGH DOSE AMOUNT: ABNORMAL
VANCOMYCIN TROUGH TIME LAST DOSE: ABNORMAL
VANCOMYCIN TROUGH: 6.1 UG/ML (ref 10–20)
VT: ABNORMAL
WBC # BLD: 10.8 K/UL (ref 3.5–11.3)
WBC # BLD: 11.5 K/UL (ref 3.5–11.3)
WBC # BLD: 12.2 K/UL (ref 3.5–11.3)
WBC # BLD: 12.5 K/UL (ref 3.5–11.3)
WBC # BLD: 12.6 K/UL (ref 3.5–11.3)
WBC # BLD: 13.1 K/UL (ref 3.5–11.3)
WBC # BLD: 13.4 K/UL (ref 3.5–11.3)
WBC # BLD: 13.7 K/UL (ref 3.5–11.3)
WBC # BLD: 14.2 K/UL (ref 3.5–11.3)
WBC # BLD: 9.9 K/UL (ref 3.5–11.3)
WBC # BLD: ABNORMAL 10*3/UL
WBC UA: NORMAL /HPF (ref 0–5)
YEAST: NORMAL
ZZ NTE CLEAN UP: ORDERED TEST: NORMAL
ZZ NTE CLEAN UP: ORDERED TEST: NORMAL
ZZ NTE WITH NAME CLEAN UP: SPECIMEN SOURCE: NORMAL
ZZ NTE WITH NAME CLEAN UP: SPECIMEN SOURCE: NORMAL

## 2020-01-01 PROCEDURE — 94002 VENT MGMT INPAT INIT DAY: CPT

## 2020-01-01 PROCEDURE — 94003 VENT MGMT INPAT SUBQ DAY: CPT

## 2020-01-01 PROCEDURE — 2580000003 HC RX 258: Performed by: STUDENT IN AN ORGANIZED HEALTH CARE EDUCATION/TRAINING PROGRAM

## 2020-01-01 PROCEDURE — 6360000002 HC RX W HCPCS: Performed by: INTERNAL MEDICINE

## 2020-01-01 PROCEDURE — 6360000002 HC RX W HCPCS: Performed by: STUDENT IN AN ORGANIZED HEALTH CARE EDUCATION/TRAINING PROGRAM

## 2020-01-01 PROCEDURE — 2700000000 HC OXYGEN THERAPY PER DAY

## 2020-01-01 PROCEDURE — 86140 C-REACTIVE PROTEIN: CPT

## 2020-01-01 PROCEDURE — 74018 RADEX ABDOMEN 1 VIEW: CPT

## 2020-01-01 PROCEDURE — 85025 COMPLETE CBC W/AUTO DIFF WBC: CPT

## 2020-01-01 PROCEDURE — 83605 ASSAY OF LACTIC ACID: CPT

## 2020-01-01 PROCEDURE — 83930 ASSAY OF BLOOD OSMOLALITY: CPT

## 2020-01-01 PROCEDURE — 82803 BLOOD GASES ANY COMBINATION: CPT

## 2020-01-01 PROCEDURE — 6370000000 HC RX 637 (ALT 250 FOR IP): Performed by: INTERNAL MEDICINE

## 2020-01-01 PROCEDURE — 84157 ASSAY OF PROTEIN OTHER: CPT

## 2020-01-01 PROCEDURE — 80074 ACUTE HEPATITIS PANEL: CPT

## 2020-01-01 PROCEDURE — 6360000002 HC RX W HCPCS

## 2020-01-01 PROCEDURE — 99233 SBSQ HOSP IP/OBS HIGH 50: CPT | Performed by: INTERNAL MEDICINE

## 2020-01-01 PROCEDURE — 93005 ELECTROCARDIOGRAM TRACING: CPT | Performed by: STUDENT IN AN ORGANIZED HEALTH CARE EDUCATION/TRAINING PROGRAM

## 2020-01-01 PROCEDURE — 36600 WITHDRAWAL OF ARTERIAL BLOOD: CPT

## 2020-01-01 PROCEDURE — 71045 X-RAY EXAM CHEST 1 VIEW: CPT

## 2020-01-01 PROCEDURE — 6370000000 HC RX 637 (ALT 250 FOR IP): Performed by: STUDENT IN AN ORGANIZED HEALTH CARE EDUCATION/TRAINING PROGRAM

## 2020-01-01 PROCEDURE — 85730 THROMBOPLASTIN TIME PARTIAL: CPT

## 2020-01-01 PROCEDURE — 2500000003 HC RX 250 WO HCPCS: Performed by: STUDENT IN AN ORGANIZED HEALTH CARE EDUCATION/TRAINING PROGRAM

## 2020-01-01 PROCEDURE — 36415 COLL VENOUS BLD VENIPUNCTURE: CPT

## 2020-01-01 PROCEDURE — 80202 ASSAY OF VANCOMYCIN: CPT

## 2020-01-01 PROCEDURE — 94761 N-INVAS EAR/PLS OXIMETRY MLT: CPT

## 2020-01-01 PROCEDURE — 80048 BASIC METABOLIC PNL TOTAL CA: CPT

## 2020-01-01 PROCEDURE — 99291 CRITICAL CARE FIRST HOUR: CPT | Performed by: INTERNAL MEDICINE

## 2020-01-01 PROCEDURE — 84484 ASSAY OF TROPONIN QUANT: CPT

## 2020-01-01 PROCEDURE — 87040 BLOOD CULTURE FOR BACTERIA: CPT

## 2020-01-01 PROCEDURE — 2000000000 HC ICU R&B

## 2020-01-01 PROCEDURE — 86738 MYCOPLASMA ANTIBODY: CPT

## 2020-01-01 PROCEDURE — 84478 ASSAY OF TRIGLYCERIDES: CPT

## 2020-01-01 PROCEDURE — 87205 SMEAR GRAM STAIN: CPT

## 2020-01-01 PROCEDURE — 82570 ASSAY OF URINE CREATININE: CPT

## 2020-01-01 PROCEDURE — C9113 INJ PANTOPRAZOLE SODIUM, VIA: HCPCS | Performed by: STUDENT IN AN ORGANIZED HEALTH CARE EDUCATION/TRAINING PROGRAM

## 2020-01-01 PROCEDURE — 87077 CULTURE AEROBIC IDENTIFY: CPT

## 2020-01-01 PROCEDURE — 86698 HISTOPLASMA ANTIBODY: CPT

## 2020-01-01 PROCEDURE — 87327 CRYPTOCOCCUS NEOFORM AG IA: CPT

## 2020-01-01 PROCEDURE — 80076 HEPATIC FUNCTION PANEL: CPT

## 2020-01-01 PROCEDURE — 99232 SBSQ HOSP IP/OBS MODERATE 35: CPT | Performed by: INTERNAL MEDICINE

## 2020-01-01 PROCEDURE — 82947 ASSAY GLUCOSE BLOOD QUANT: CPT

## 2020-01-01 PROCEDURE — 94770 HC ETCO2 MONITOR DAILY: CPT

## 2020-01-01 PROCEDURE — 87449 NOS EACH ORGANISM AG IA: CPT

## 2020-01-01 PROCEDURE — U0003 INFECTIOUS AGENT DETECTION BY NUCLEIC ACID (DNA OR RNA); SEVERE ACUTE RESPIRATORY SYNDROME CORONAVIRUS 2 (SARS-COV-2) (CORONAVIRUS DISEASE [COVID-19]), AMPLIFIED PROBE TECHNIQUE, MAKING USE OF HIGH THROUGHPUT TECHNOLOGIES AS DESCRIBED BY CMS-2020-01-R: HCPCS

## 2020-01-01 PROCEDURE — 87483 CNS DNA AMP PROBE TYPE 12-25: CPT

## 2020-01-01 PROCEDURE — 37799 UNLISTED PX VASCULAR SURGERY: CPT

## 2020-01-01 PROCEDURE — 84300 ASSAY OF URINE SODIUM: CPT

## 2020-01-01 PROCEDURE — 0B9J8ZX DRAINAGE OF LEFT LOWER LUNG LOBE, VIA NATURAL OR ARTIFICIAL OPENING ENDOSCOPIC, DIAGNOSTIC: ICD-10-PCS | Performed by: INTERNAL MEDICINE

## 2020-01-01 PROCEDURE — 87070 CULTURE OTHR SPECIMN AEROBIC: CPT

## 2020-01-01 PROCEDURE — 31624 DX BRONCHOSCOPE/LAVAGE: CPT | Performed by: INTERNAL MEDICINE

## 2020-01-01 PROCEDURE — 85055 RETICULATED PLATELET ASSAY: CPT

## 2020-01-01 PROCEDURE — 93970 EXTREMITY STUDY: CPT

## 2020-01-01 PROCEDURE — 86403 PARTICLE AGGLUT ANTBDY SCRN: CPT

## 2020-01-01 PROCEDURE — 82945 GLUCOSE OTHER FLUID: CPT

## 2020-01-01 PROCEDURE — 93306 TTE W/DOPPLER COMPLETE: CPT

## 2020-01-01 PROCEDURE — 76770 US EXAM ABDO BACK WALL COMP: CPT

## 2020-01-01 PROCEDURE — 5A1955Z RESPIRATORY VENTILATION, GREATER THAN 96 CONSECUTIVE HOURS: ICD-10-PCS | Performed by: INTERNAL MEDICINE

## 2020-01-01 PROCEDURE — 76705 ECHO EXAM OF ABDOMEN: CPT

## 2020-01-01 PROCEDURE — 83935 ASSAY OF URINE OSMOLALITY: CPT

## 2020-01-01 PROCEDURE — U0002 COVID-19 LAB TEST NON-CDC: HCPCS

## 2020-01-01 PROCEDURE — 80299 QUANTITATIVE ASSAY DRUG: CPT

## 2020-01-01 PROCEDURE — 94640 AIRWAY INHALATION TREATMENT: CPT

## 2020-01-01 PROCEDURE — 2580000003 HC RX 258: Performed by: INTERNAL MEDICINE

## 2020-01-01 PROCEDURE — 84540 ASSAY OF URINE/UREA-N: CPT

## 2020-01-01 PROCEDURE — 0B9F8ZX DRAINAGE OF RIGHT LOWER LUNG LOBE, VIA NATURAL OR ARTIFICIAL OPENING ENDOSCOPIC, DIAGNOSTIC: ICD-10-PCS | Performed by: INTERNAL MEDICINE

## 2020-01-01 PROCEDURE — 87385 HISTOPLASMA CAPSUL AG IA: CPT

## 2020-01-01 PROCEDURE — 85027 COMPLETE CBC AUTOMATED: CPT

## 2020-01-01 PROCEDURE — 83880 ASSAY OF NATRIURETIC PEPTIDE: CPT

## 2020-01-01 PROCEDURE — 87186 SC STD MICRODIL/AGAR DIL: CPT

## 2020-01-01 PROCEDURE — 87641 MR-STAPH DNA AMP PROBE: CPT

## 2020-01-01 PROCEDURE — 2500000003 HC RX 250 WO HCPCS

## 2020-01-01 PROCEDURE — 99222 1ST HOSP IP/OBS MODERATE 55: CPT | Performed by: INTERNAL MEDICINE

## 2020-01-01 PROCEDURE — 81001 URINALYSIS AUTO W/SCOPE: CPT

## 2020-01-01 PROCEDURE — 0202U NFCT DS 22 TRGT SARS-COV-2: CPT

## 2020-01-01 PROCEDURE — 36620 INSERTION CATHETER ARTERY: CPT

## 2020-01-01 PROCEDURE — 87150 DNA/RNA AMPLIFIED PROBE: CPT

## 2020-01-01 PROCEDURE — 82805 BLOOD GASES W/O2 SATURATION: CPT

## 2020-01-01 RX ORDER — LORAZEPAM 2 MG/ML
INJECTION INTRAMUSCULAR
Status: COMPLETED
Start: 2020-01-01 | End: 2020-01-01

## 2020-01-01 RX ORDER — BUMETANIDE 0.25 MG/ML
2 INJECTION, SOLUTION INTRAMUSCULAR; INTRAVENOUS ONCE
Status: COMPLETED | OUTPATIENT
Start: 2020-01-01 | End: 2020-01-01

## 2020-01-01 RX ORDER — METOCLOPRAMIDE HYDROCHLORIDE 5 MG/ML
5 INJECTION INTRAMUSCULAR; INTRAVENOUS EVERY 6 HOURS
Status: DISCONTINUED | OUTPATIENT
Start: 2020-01-01 | End: 2020-01-01

## 2020-01-01 RX ORDER — POLYETHYLENE GLYCOL 3350 17 G/17G
17 POWDER, FOR SOLUTION ORAL DAILY PRN
Status: DISCONTINUED | OUTPATIENT
Start: 2020-01-01 | End: 2020-01-01

## 2020-01-01 RX ORDER — HYDROCHLOROTHIAZIDE 25 MG/1
25 TABLET ORAL DAILY
Status: DISCONTINUED | OUTPATIENT
Start: 2020-01-01 | End: 2020-01-01

## 2020-01-01 RX ORDER — HEPARIN SODIUM 1000 [USP'U]/ML
10000 INJECTION, SOLUTION INTRAVENOUS; SUBCUTANEOUS PRN
Status: DISCONTINUED | OUTPATIENT
Start: 2020-01-01 | End: 2020-01-01

## 2020-01-01 RX ORDER — 0.9 % SODIUM CHLORIDE 0.9 %
1000 INTRAVENOUS SOLUTION INTRAVENOUS ONCE
Status: COMPLETED | OUTPATIENT
Start: 2020-01-01 | End: 2020-01-01

## 2020-01-01 RX ORDER — LORAZEPAM 2 MG/ML
1 INJECTION INTRAMUSCULAR
Status: DISCONTINUED | OUTPATIENT
Start: 2020-01-01 | End: 2020-01-01 | Stop reason: HOSPADM

## 2020-01-01 RX ORDER — ALBUTEROL SULFATE 2.5 MG/3ML
2.5 SOLUTION RESPIRATORY (INHALATION) EVERY 6 HOURS PRN
Status: DISCONTINUED | OUTPATIENT
Start: 2020-01-01 | End: 2020-01-01 | Stop reason: HOSPADM

## 2020-01-01 RX ORDER — SODIUM CHLORIDE 9 MG/ML
10 INJECTION INTRAVENOUS DAILY
Status: DISCONTINUED | OUTPATIENT
Start: 2020-01-01 | End: 2020-01-01

## 2020-01-01 RX ORDER — HEPARIN SODIUM 10000 [USP'U]/100ML
7.6 INJECTION, SOLUTION INTRAVENOUS CONTINUOUS
Status: DISCONTINUED | OUTPATIENT
Start: 2020-01-01 | End: 2020-01-01

## 2020-01-01 RX ORDER — LORAZEPAM 2 MG/ML
0.5 INJECTION INTRAMUSCULAR ONCE
Status: COMPLETED | OUTPATIENT
Start: 2020-01-01 | End: 2020-01-01

## 2020-01-01 RX ORDER — ALBUTEROL SULFATE 2.5 MG/3ML
2.5 SOLUTION RESPIRATORY (INHALATION) 2 TIMES DAILY
Status: DISCONTINUED | OUTPATIENT
Start: 2020-01-01 | End: 2020-01-01

## 2020-01-01 RX ORDER — SODIUM CHLORIDE 0.9 % (FLUSH) 0.9 %
10 SYRINGE (ML) INJECTION EVERY 12 HOURS SCHEDULED
Status: DISCONTINUED | OUTPATIENT
Start: 2020-01-01 | End: 2020-01-01

## 2020-01-01 RX ORDER — HEPARIN SODIUM 1000 [USP'U]/ML
5000 INJECTION, SOLUTION INTRAVENOUS; SUBCUTANEOUS PRN
Status: DISCONTINUED | OUTPATIENT
Start: 2020-01-01 | End: 2020-01-01

## 2020-01-01 RX ORDER — NOREPINEPHRINE BIT/0.9 % NACL 16MG/250ML
2 INFUSION BOTTLE (ML) INTRAVENOUS CONTINUOUS
Status: DISCONTINUED | OUTPATIENT
Start: 2020-01-01 | End: 2020-01-01

## 2020-01-01 RX ORDER — VANCOMYCIN HYDROCHLORIDE 1 G/200ML
1000 INJECTION, SOLUTION INTRAVENOUS EVERY 8 HOURS
Status: DISCONTINUED | OUTPATIENT
Start: 2020-01-01 | End: 2020-01-01

## 2020-01-01 RX ORDER — PROMETHAZINE HYDROCHLORIDE 12.5 MG/1
12.5 TABLET ORAL EVERY 6 HOURS PRN
Status: DISCONTINUED | OUTPATIENT
Start: 2020-01-01 | End: 2020-01-01

## 2020-01-01 RX ORDER — PANTOPRAZOLE SODIUM 40 MG/10ML
40 INJECTION, POWDER, LYOPHILIZED, FOR SOLUTION INTRAVENOUS DAILY
Status: DISCONTINUED | OUTPATIENT
Start: 2020-01-01 | End: 2020-01-01

## 2020-01-01 RX ORDER — METOCLOPRAMIDE HYDROCHLORIDE 5 MG/ML
10 INJECTION INTRAMUSCULAR; INTRAVENOUS ONCE
Status: COMPLETED | OUTPATIENT
Start: 2020-01-01 | End: 2020-01-01

## 2020-01-01 RX ORDER — FENTANYL CITRATE 50 UG/ML
100 INJECTION, SOLUTION INTRAMUSCULAR; INTRAVENOUS ONCE
Status: COMPLETED | OUTPATIENT
Start: 2020-01-01 | End: 2020-01-01

## 2020-01-01 RX ORDER — PROPOFOL 10 MG/ML
10 INJECTION, EMULSION INTRAVENOUS
Status: DISCONTINUED | OUTPATIENT
Start: 2020-01-01 | End: 2020-01-01

## 2020-01-01 RX ORDER — LOPERAMIDE HYDROCHLORIDE 2 MG/1
2 CAPSULE ORAL PRN
Status: DISCONTINUED | OUTPATIENT
Start: 2020-01-01 | End: 2020-01-01 | Stop reason: HOSPADM

## 2020-01-01 RX ORDER — LEVOFLOXACIN 5 MG/ML
500 INJECTION, SOLUTION INTRAVENOUS EVERY 24 HOURS
Status: DISCONTINUED | OUTPATIENT
Start: 2020-01-01 | End: 2020-01-01

## 2020-01-01 RX ORDER — LIDOCAINE HYDROCHLORIDE 10 MG/ML
5 INJECTION, SOLUTION EPIDURAL; INFILTRATION; INTRACAUDAL; PERINEURAL ONCE
Status: DISCONTINUED | OUTPATIENT
Start: 2020-01-01 | End: 2020-01-01 | Stop reason: HOSPADM

## 2020-01-01 RX ORDER — LANSOPRAZOLE
30 KIT
Status: DISCONTINUED | OUTPATIENT
Start: 2020-01-01 | End: 2020-01-01

## 2020-01-01 RX ORDER — ONDANSETRON 2 MG/ML
4 INJECTION INTRAMUSCULAR; INTRAVENOUS EVERY 6 HOURS PRN
Status: DISCONTINUED | OUTPATIENT
Start: 2020-01-01 | End: 2020-01-01 | Stop reason: HOSPADM

## 2020-01-01 RX ORDER — FENTANYL CITRATE 50 UG/ML
INJECTION, SOLUTION INTRAMUSCULAR; INTRAVENOUS
Status: COMPLETED
Start: 2020-01-01 | End: 2020-01-01

## 2020-01-01 RX ORDER — SODIUM CHLORIDE 0.9 % (FLUSH) 0.9 %
10 SYRINGE (ML) INJECTION PRN
Status: DISCONTINUED | OUTPATIENT
Start: 2020-01-01 | End: 2020-01-01

## 2020-01-01 RX ORDER — FUROSEMIDE 10 MG/ML
40 INJECTION INTRAMUSCULAR; INTRAVENOUS ONCE
Status: COMPLETED | OUTPATIENT
Start: 2020-01-01 | End: 2020-01-01

## 2020-01-01 RX ORDER — MIDAZOLAM HYDROCHLORIDE 1 MG/ML
INJECTION INTRAMUSCULAR; INTRAVENOUS
Status: COMPLETED
Start: 2020-01-01 | End: 2020-01-01

## 2020-01-01 RX ORDER — ACETAMINOPHEN 325 MG/1
650 TABLET ORAL EVERY 6 HOURS PRN
Status: DISCONTINUED | OUTPATIENT
Start: 2020-01-01 | End: 2020-01-01

## 2020-01-01 RX ORDER — LIDOCAINE HYDROCHLORIDE 10 MG/ML
INJECTION, SOLUTION INFILTRATION; PERINEURAL
Status: COMPLETED
Start: 2020-01-01 | End: 2020-01-01

## 2020-01-01 RX ORDER — IPRATROPIUM BROMIDE AND ALBUTEROL SULFATE 2.5; .5 MG/3ML; MG/3ML
1 SOLUTION RESPIRATORY (INHALATION) 4 TIMES DAILY
Status: DISCONTINUED | OUTPATIENT
Start: 2020-01-01 | End: 2020-01-01

## 2020-01-01 RX ORDER — ACETAMINOPHEN 325 MG/1
650 TABLET ORAL EVERY 4 HOURS PRN
Status: DISCONTINUED | OUTPATIENT
Start: 2020-01-01 | End: 2020-01-01 | Stop reason: HOSPADM

## 2020-01-01 RX ORDER — SODIUM CHLORIDE 9 MG/ML
INJECTION, SOLUTION INTRAVENOUS CONTINUOUS
Status: DISCONTINUED | OUTPATIENT
Start: 2020-01-01 | End: 2020-01-01

## 2020-01-01 RX ORDER — FUROSEMIDE 10 MG/ML
40 INJECTION INTRAMUSCULAR; INTRAVENOUS EVERY 6 HOURS
Status: COMPLETED | OUTPATIENT
Start: 2020-01-01 | End: 2020-01-01

## 2020-01-01 RX ORDER — LORAZEPAM 2 MG/ML
INJECTION INTRAMUSCULAR
Status: DISCONTINUED
Start: 2020-01-01 | End: 2020-01-01 | Stop reason: HOSPADM

## 2020-01-01 RX ORDER — HEPARIN SODIUM 1000 [USP'U]/ML
10000 INJECTION, SOLUTION INTRAVENOUS; SUBCUTANEOUS ONCE
Status: COMPLETED | OUTPATIENT
Start: 2020-01-01 | End: 2020-01-01

## 2020-01-01 RX ORDER — FUROSEMIDE 10 MG/ML
80 INJECTION INTRAMUSCULAR; INTRAVENOUS ONCE
Status: COMPLETED | OUTPATIENT
Start: 2020-01-01 | End: 2020-01-01

## 2020-01-01 RX ORDER — LIDOCAINE HYDROCHLORIDE 20 MG/ML
JELLY TOPICAL
Status: DISPENSED
Start: 2020-01-01 | End: 2020-01-01

## 2020-01-01 RX ORDER — MORPHINE SULFATE 4 MG/ML
INJECTION, SOLUTION INTRAMUSCULAR; INTRAVENOUS
Status: COMPLETED
Start: 2020-01-01 | End: 2020-01-01

## 2020-01-01 RX ORDER — ONDANSETRON 2 MG/ML
4 INJECTION INTRAMUSCULAR; INTRAVENOUS EVERY 6 HOURS PRN
Status: DISCONTINUED | OUTPATIENT
Start: 2020-01-01 | End: 2020-01-01

## 2020-01-01 RX ORDER — MIDODRINE HYDROCHLORIDE 5 MG/1
10 TABLET ORAL 4 TIMES DAILY
Status: DISCONTINUED | OUTPATIENT
Start: 2020-01-01 | End: 2020-01-01

## 2020-01-01 RX ORDER — MIDODRINE HYDROCHLORIDE 5 MG/1
10 TABLET ORAL 3 TIMES DAILY
Status: DISCONTINUED | OUTPATIENT
Start: 2020-01-01 | End: 2020-01-01

## 2020-01-01 RX ORDER — POLYETHYLENE GLYCOL 3350 17 G/17G
17 POWDER, FOR SOLUTION ORAL DAILY
Status: DISCONTINUED | OUTPATIENT
Start: 2020-01-01 | End: 2020-01-01 | Stop reason: HOSPADM

## 2020-01-01 RX ORDER — ACETAMINOPHEN 650 MG/1
650 SUPPOSITORY RECTAL EVERY 4 HOURS PRN
Status: DISCONTINUED | OUTPATIENT
Start: 2020-01-01 | End: 2020-01-01 | Stop reason: HOSPADM

## 2020-01-01 RX ORDER — ITRACONAZOLE 100 MG/1
200 CAPSULE ORAL 3 TIMES DAILY
Status: DISCONTINUED | OUTPATIENT
Start: 2020-01-01 | End: 2020-01-01

## 2020-01-01 RX ORDER — ACETAMINOPHEN 650 MG/1
650 SUPPOSITORY RECTAL EVERY 6 HOURS PRN
Status: DISCONTINUED | OUTPATIENT
Start: 2020-01-01 | End: 2020-01-01

## 2020-01-01 RX ORDER — MORPHINE SULFATE 4 MG/ML
4 INJECTION, SOLUTION INTRAMUSCULAR; INTRAVENOUS
Status: DISCONTINUED | OUTPATIENT
Start: 2020-01-01 | End: 2020-01-01 | Stop reason: HOSPADM

## 2020-01-01 RX ORDER — GLYCOPYRROLATE 0.2 MG/ML
0.2 INJECTION INTRAMUSCULAR; INTRAVENOUS EVERY 4 HOURS PRN
Status: DISCONTINUED | OUTPATIENT
Start: 2020-01-01 | End: 2020-01-01 | Stop reason: HOSPADM

## 2020-01-01 RX ORDER — MORPHINE SULFATE 2 MG/ML
2 INJECTION, SOLUTION INTRAMUSCULAR; INTRAVENOUS
Status: DISCONTINUED | OUTPATIENT
Start: 2020-01-01 | End: 2020-01-01 | Stop reason: HOSPADM

## 2020-01-01 RX ORDER — MIDAZOLAM HYDROCHLORIDE 2 MG/2ML
2 INJECTION, SOLUTION INTRAMUSCULAR; INTRAVENOUS ONCE
Status: COMPLETED | OUTPATIENT
Start: 2020-01-01 | End: 2020-01-01

## 2020-01-01 RX ADMIN — ACYCLOVIR SODIUM 700 MG: 50 INJECTION, SOLUTION INTRAVENOUS at 04:25

## 2020-01-01 RX ADMIN — SODIUM CHLORIDE, PRESERVATIVE FREE 10 ML: 5 INJECTION INTRAVENOUS at 21:10

## 2020-01-01 RX ADMIN — PROPOFOL 15 MCG/KG/MIN: 10 INJECTION, EMULSION INTRAVENOUS at 16:33

## 2020-01-01 RX ADMIN — BUMETANIDE 2 MG: 0.25 INJECTION, SOLUTION INTRAMUSCULAR; INTRAVENOUS at 14:24

## 2020-01-01 RX ADMIN — ACETAMINOPHEN 650 MG: 325 TABLET ORAL at 16:03

## 2020-01-01 RX ADMIN — ACETAMINOPHEN 650 MG: 325 TABLET ORAL at 09:04

## 2020-01-01 RX ADMIN — ENOXAPARIN SODIUM 40 MG: 40 INJECTION SUBCUTANEOUS at 08:11

## 2020-01-01 RX ADMIN — PROPOFOL 15 MCG/KG/MIN: 10 INJECTION, EMULSION INTRAVENOUS at 08:26

## 2020-01-01 RX ADMIN — VANCOMYCIN HYDROCHLORIDE 1000 MG: 1 INJECTION, SOLUTION INTRAVENOUS at 03:00

## 2020-01-01 RX ADMIN — PIPERACILLIN AND TAZOBACTAM 3.38 G: 3; .375 INJECTION, POWDER, LYOPHILIZED, FOR SOLUTION INTRAVENOUS at 03:57

## 2020-01-01 RX ADMIN — MIDODRINE HYDROCHLORIDE 10 MG: 5 TABLET ORAL at 20:28

## 2020-01-01 RX ADMIN — SODIUM CHLORIDE, PRESERVATIVE FREE 10 ML: 5 INJECTION INTRAVENOUS at 22:33

## 2020-01-01 RX ADMIN — PROPOFOL 25 MCG/KG/MIN: 10 INJECTION, EMULSION INTRAVENOUS at 03:22

## 2020-01-01 RX ADMIN — MIDODRINE HYDROCHLORIDE 10 MG: 5 TABLET ORAL at 08:23

## 2020-01-01 RX ADMIN — SODIUM CHLORIDE, PRESERVATIVE FREE 10 ML: 5 INJECTION INTRAVENOUS at 20:27

## 2020-01-01 RX ADMIN — ACYCLOVIR SODIUM 700 MG: 50 INJECTION, SOLUTION INTRAVENOUS at 05:17

## 2020-01-01 RX ADMIN — PROPOFOL 20 MCG/KG/MIN: 10 INJECTION, EMULSION INTRAVENOUS at 03:45

## 2020-01-01 RX ADMIN — ENOXAPARIN SODIUM 40 MG: 40 INJECTION SUBCUTANEOUS at 07:19

## 2020-01-01 RX ADMIN — ALBUTEROL SULFATE 2.5 MG: 2.5 SOLUTION RESPIRATORY (INHALATION) at 03:18

## 2020-01-01 RX ADMIN — Medication 10 ML: at 11:04

## 2020-01-01 RX ADMIN — ACYCLOVIR SODIUM 700 MG: 50 INJECTION, SOLUTION INTRAVENOUS at 22:33

## 2020-01-01 RX ADMIN — ENOXAPARIN SODIUM 40 MG: 40 INJECTION SUBCUTANEOUS at 09:03

## 2020-01-01 RX ADMIN — NOREPINEPHRINE BITARTRATE 5 MCG/MIN: 1 INJECTION, SOLUTION, CONCENTRATE INTRAVENOUS at 12:55

## 2020-01-01 RX ADMIN — HEPARIN SODIUM 15 UNITS/KG/HR: 10000 INJECTION, SOLUTION INTRAVENOUS at 00:21

## 2020-01-01 RX ADMIN — PROPOFOL 16 MCG/KG/MIN: 10 INJECTION, EMULSION INTRAVENOUS at 00:01

## 2020-01-01 RX ADMIN — HEPARIN SODIUM 19 UNITS/KG/HR: 10000 INJECTION, SOLUTION INTRAVENOUS at 01:04

## 2020-01-01 RX ADMIN — PROPOFOL 17.5 MCG/KG/MIN: 10 INJECTION, EMULSION INTRAVENOUS at 15:15

## 2020-01-01 RX ADMIN — SODIUM CHLORIDE, PRESERVATIVE FREE 10 ML: 5 INJECTION INTRAVENOUS at 20:40

## 2020-01-01 RX ADMIN — MIDODRINE HYDROCHLORIDE 10 MG: 5 TABLET ORAL at 13:36

## 2020-01-01 RX ADMIN — CEFTAROLINE FOSAMIL 600 MG: 600 POWDER, FOR SOLUTION INTRAVENOUS at 04:03

## 2020-01-01 RX ADMIN — Medication 30 MG: at 05:33

## 2020-01-01 RX ADMIN — PROPOFOL 20 MCG/KG/MIN: 10 INJECTION, EMULSION INTRAVENOUS at 15:25

## 2020-01-01 RX ADMIN — PANTOPRAZOLE SODIUM 40 MG: 40 INJECTION, POWDER, FOR SOLUTION INTRAVENOUS at 11:04

## 2020-01-01 RX ADMIN — Medication 30 MG: at 05:14

## 2020-01-01 RX ADMIN — HEPARIN SODIUM 17 UNITS/KG/HR: 10000 INJECTION, SOLUTION INTRAVENOUS at 12:53

## 2020-01-01 RX ADMIN — CEFTAROLINE FOSAMIL 600 MG: 600 POWDER, FOR SOLUTION INTRAVENOUS at 16:30

## 2020-01-01 RX ADMIN — PIPERACILLIN AND TAZOBACTAM 3.38 G: 3; .375 INJECTION, POWDER, LYOPHILIZED, FOR SOLUTION INTRAVENOUS at 20:00

## 2020-01-01 RX ADMIN — LIDOCAINE HYDROCHLORIDE 200 MG: 10 INJECTION, SOLUTION INFILTRATION; PERINEURAL at 11:35

## 2020-01-01 RX ADMIN — SODIUM CHLORIDE 100 ML/HR: 9 INJECTION, SOLUTION INTRAVENOUS at 15:57

## 2020-01-01 RX ADMIN — PROPOFOL 17.5 MCG/KG/MIN: 10 INJECTION, EMULSION INTRAVENOUS at 08:21

## 2020-01-01 RX ADMIN — PROPOFOL 25 MCG/KG/MIN: 10 INJECTION, EMULSION INTRAVENOUS at 10:37

## 2020-01-01 RX ADMIN — PROPOFOL 15 MCG/KG/MIN: 10 INJECTION, EMULSION INTRAVENOUS at 07:35

## 2020-01-01 RX ADMIN — PROPOFOL 20 MCG/KG/MIN: 10 INJECTION, EMULSION INTRAVENOUS at 18:15

## 2020-01-01 RX ADMIN — Medication 30 MG: at 09:00

## 2020-01-01 RX ADMIN — PROPOFOL 17.5 MCG/KG/MIN: 10 INJECTION, EMULSION INTRAVENOUS at 12:02

## 2020-01-01 RX ADMIN — ACETAMINOPHEN 650 MG: 325 TABLET ORAL at 20:20

## 2020-01-01 RX ADMIN — ACYCLOVIR SODIUM 700 MG: 50 INJECTION, SOLUTION INTRAVENOUS at 20:49

## 2020-01-01 RX ADMIN — Medication 1 MG/HR: at 10:46

## 2020-01-01 RX ADMIN — PIPERACILLIN AND TAZOBACTAM 3.38 G: 3; .375 INJECTION, POWDER, LYOPHILIZED, FOR SOLUTION INTRAVENOUS at 11:21

## 2020-01-01 RX ADMIN — PROPOFOL 15 MCG/KG/MIN: 10 INJECTION, EMULSION INTRAVENOUS at 12:57

## 2020-01-01 RX ADMIN — PROPOFOL 20 MCG/KG/MIN: 10 INJECTION, EMULSION INTRAVENOUS at 11:47

## 2020-01-01 RX ADMIN — SODIUM CHLORIDE, PRESERVATIVE FREE 10 ML: 5 INJECTION INTRAVENOUS at 08:24

## 2020-01-01 RX ADMIN — PROPOFOL 17.5 MCG/KG/MIN: 10 INJECTION, EMULSION INTRAVENOUS at 22:07

## 2020-01-01 RX ADMIN — PROPOFOL 20 MCG/KG/MIN: 10 INJECTION, EMULSION INTRAVENOUS at 08:49

## 2020-01-01 RX ADMIN — METOCLOPRAMIDE 10 MG: 5 INJECTION, SOLUTION INTRAMUSCULAR; INTRAVENOUS at 22:15

## 2020-01-01 RX ADMIN — PROPOFOL 30 MCG/KG/MIN: 10 INJECTION, EMULSION INTRAVENOUS at 13:38

## 2020-01-01 RX ADMIN — ENOXAPARIN SODIUM 40 MG: 40 INJECTION SUBCUTANEOUS at 20:39

## 2020-01-01 RX ADMIN — LEVOFLOXACIN 500 MG: 5 INJECTION, SOLUTION INTRAVENOUS at 22:33

## 2020-01-01 RX ADMIN — PROPOFOL 15 MCG/KG/MIN: 10 INJECTION, EMULSION INTRAVENOUS at 07:45

## 2020-01-01 RX ADMIN — PROPOFOL 15 MCG/KG/MIN: 10 INJECTION, EMULSION INTRAVENOUS at 18:47

## 2020-01-01 RX ADMIN — Medication 50 MCG/HR: at 06:17

## 2020-01-01 RX ADMIN — MIDODRINE HYDROCHLORIDE 10 MG: 5 TABLET ORAL at 16:28

## 2020-01-01 RX ADMIN — PIPERACILLIN AND TAZOBACTAM 3.38 G: 3; .375 INJECTION, POWDER, LYOPHILIZED, FOR SOLUTION INTRAVENOUS at 12:29

## 2020-01-01 RX ADMIN — HEPARIN SODIUM 19 UNITS/KG/HR: 10000 INJECTION, SOLUTION INTRAVENOUS at 20:03

## 2020-01-01 RX ADMIN — SODIUM CHLORIDE: 9 INJECTION, SOLUTION INTRAVENOUS at 05:11

## 2020-01-01 RX ADMIN — PROPOFOL 20 MCG/KG/MIN: 10 INJECTION, EMULSION INTRAVENOUS at 06:47

## 2020-01-01 RX ADMIN — HEPARIN SODIUM 17 UNITS/KG/HR: 10000 INJECTION, SOLUTION INTRAVENOUS at 07:36

## 2020-01-01 RX ADMIN — ALBUTEROL SULFATE 2.5 MG: 2.5 SOLUTION RESPIRATORY (INHALATION) at 23:25

## 2020-01-01 RX ADMIN — LEVOFLOXACIN 500 MG: 5 INJECTION, SOLUTION INTRAVENOUS at 20:38

## 2020-01-01 RX ADMIN — MIDODRINE HYDROCHLORIDE 10 MG: 5 TABLET ORAL at 20:48

## 2020-01-01 RX ADMIN — MIDODRINE HYDROCHLORIDE 10 MG: 5 TABLET ORAL at 08:40

## 2020-01-01 RX ADMIN — PROPOFOL 15 MCG/KG/MIN: 10 INJECTION, EMULSION INTRAVENOUS at 21:09

## 2020-01-01 RX ADMIN — PROPOFOL 15 MCG/KG/MIN: 10 INJECTION, EMULSION INTRAVENOUS at 05:26

## 2020-01-01 RX ADMIN — HEPARIN SODIUM 5000 UNITS: 1000 INJECTION INTRAVENOUS; SUBCUTANEOUS at 05:31

## 2020-01-01 RX ADMIN — PROPOFOL 25 MCG/KG/MIN: 10 INJECTION, EMULSION INTRAVENOUS at 18:21

## 2020-01-01 RX ADMIN — PROPOFOL 17.49 MCG/KG/MIN: 10 INJECTION, EMULSION INTRAVENOUS at 21:56

## 2020-01-01 RX ADMIN — SODIUM CHLORIDE, PRESERVATIVE FREE 10 ML: 5 INJECTION INTRAVENOUS at 20:42

## 2020-01-01 RX ADMIN — ENOXAPARIN SODIUM 40 MG: 40 INJECTION SUBCUTANEOUS at 08:24

## 2020-01-01 RX ADMIN — ENOXAPARIN SODIUM 40 MG: 40 INJECTION SUBCUTANEOUS at 21:20

## 2020-01-01 RX ADMIN — PROPOFOL 30 MCG/KG/MIN: 10 INJECTION, EMULSION INTRAVENOUS at 01:13

## 2020-01-01 RX ADMIN — FUROSEMIDE 40 MG: 10 INJECTION, SOLUTION INTRAMUSCULAR; INTRAVENOUS at 00:04

## 2020-01-01 RX ADMIN — PROPOFOL 20 MCG/KG/MIN: 10 INJECTION, EMULSION INTRAVENOUS at 08:10

## 2020-01-01 RX ADMIN — PROPOFOL 20 MCG/KG/MIN: 10 INJECTION, EMULSION INTRAVENOUS at 10:22

## 2020-01-01 RX ADMIN — SODIUM CHLORIDE, PRESERVATIVE FREE 10 ML: 5 INJECTION INTRAVENOUS at 21:19

## 2020-01-01 RX ADMIN — ENOXAPARIN SODIUM 40 MG: 40 INJECTION SUBCUTANEOUS at 20:41

## 2020-01-01 RX ADMIN — SODIUM CHLORIDE, PRESERVATIVE FREE 10 ML: 5 INJECTION INTRAVENOUS at 21:31

## 2020-01-01 RX ADMIN — MIDODRINE HYDROCHLORIDE 10 MG: 5 TABLET ORAL at 08:52

## 2020-01-01 RX ADMIN — MIDODRINE HYDROCHLORIDE 10 MG: 5 TABLET ORAL at 16:23

## 2020-01-01 RX ADMIN — IPRATROPIUM BROMIDE AND ALBUTEROL SULFATE 1 AMPULE: .5; 3 SOLUTION RESPIRATORY (INHALATION) at 20:04

## 2020-01-01 RX ADMIN — METOCLOPRAMIDE 5 MG: 5 INJECTION, SOLUTION INTRAMUSCULAR; INTRAVENOUS at 21:41

## 2020-01-01 RX ADMIN — METOCLOPRAMIDE 5 MG: 5 INJECTION, SOLUTION INTRAMUSCULAR; INTRAVENOUS at 04:18

## 2020-01-01 RX ADMIN — Medication 5 MG/HR: at 10:09

## 2020-01-01 RX ADMIN — HEPARIN SODIUM 18 UNITS/KG/HR: 10000 INJECTION, SOLUTION INTRAVENOUS at 04:42

## 2020-01-01 RX ADMIN — MORPHINE SULFATE 4 MG: 4 INJECTION INTRAVENOUS at 16:08

## 2020-01-01 RX ADMIN — LORAZEPAM 1 MG: 2 INJECTION INTRAMUSCULAR at 16:08

## 2020-01-01 RX ADMIN — PROPOFOL 17.5 MCG/KG/MIN: 10 INJECTION, EMULSION INTRAVENOUS at 01:02

## 2020-01-01 RX ADMIN — FUROSEMIDE 80 MG: 10 INJECTION, SOLUTION INTRAMUSCULAR; INTRAVENOUS at 23:35

## 2020-01-01 RX ADMIN — VANCOMYCIN HYDROCHLORIDE 1000 MG: 1 INJECTION, SOLUTION INTRAVENOUS at 11:18

## 2020-01-01 RX ADMIN — SODIUM CHLORIDE, PRESERVATIVE FREE 10 ML: 5 INJECTION INTRAVENOUS at 20:05

## 2020-01-01 RX ADMIN — ITRACONAZOLE 200 MG: 100 CAPSULE ORAL at 08:41

## 2020-01-01 RX ADMIN — MIDODRINE HYDROCHLORIDE 10 MG: 5 TABLET ORAL at 13:48

## 2020-01-01 RX ADMIN — PROPOFOL 20 MCG/KG/MIN: 10 INJECTION, EMULSION INTRAVENOUS at 11:53

## 2020-01-01 RX ADMIN — FUROSEMIDE 40 MG: 10 INJECTION, SOLUTION INTRAMUSCULAR; INTRAVENOUS at 17:46

## 2020-01-01 RX ADMIN — SODIUM CHLORIDE, PRESERVATIVE FREE 10 ML: 5 INJECTION INTRAVENOUS at 22:03

## 2020-01-01 RX ADMIN — ACETAMINOPHEN 650 MG: 325 TABLET ORAL at 13:36

## 2020-01-01 RX ADMIN — SODIUM CHLORIDE, PRESERVATIVE FREE 10 ML: 5 INJECTION INTRAVENOUS at 08:52

## 2020-01-01 RX ADMIN — PROPOFOL 15 MCG/KG/MIN: 10 INJECTION, EMULSION INTRAVENOUS at 19:45

## 2020-01-01 RX ADMIN — Medication 50 MCG/HR: at 01:07

## 2020-01-01 RX ADMIN — FENTANYL CITRATE 100 MCG: 50 INJECTION, SOLUTION INTRAMUSCULAR; INTRAVENOUS at 11:26

## 2020-01-01 RX ADMIN — METOCLOPRAMIDE 5 MG: 5 INJECTION, SOLUTION INTRAMUSCULAR; INTRAVENOUS at 10:06

## 2020-01-01 RX ADMIN — METOCLOPRAMIDE 5 MG: 5 INJECTION, SOLUTION INTRAMUSCULAR; INTRAVENOUS at 16:23

## 2020-01-01 RX ADMIN — HEPARIN SODIUM 10000 UNITS: 1000 INJECTION INTRAVENOUS; SUBCUTANEOUS at 19:00

## 2020-01-01 RX ADMIN — PROPOFOL 15 MCG/KG/MIN: 10 INJECTION, EMULSION INTRAVENOUS at 14:35

## 2020-01-01 RX ADMIN — HEPARIN SODIUM 15 UNITS/KG/HR: 10000 INJECTION, SOLUTION INTRAVENOUS at 01:06

## 2020-01-01 RX ADMIN — MIDODRINE HYDROCHLORIDE 10 MG: 5 TABLET ORAL at 22:33

## 2020-01-01 RX ADMIN — VANCOMYCIN HYDROCHLORIDE 1000 MG: 1 INJECTION, SOLUTION INTRAVENOUS at 17:42

## 2020-01-01 RX ADMIN — VANCOMYCIN HYDROCHLORIDE 1500 MG: 10 INJECTION, POWDER, LYOPHILIZED, FOR SOLUTION INTRAVENOUS at 08:42

## 2020-01-01 RX ADMIN — PROPOFOL 20 MCG/KG/MIN: 10 INJECTION, EMULSION INTRAVENOUS at 16:13

## 2020-01-01 RX ADMIN — ACYCLOVIR SODIUM 700 MG: 50 INJECTION, SOLUTION INTRAVENOUS at 20:38

## 2020-01-01 RX ADMIN — Medication 30 MG: at 08:31

## 2020-01-01 RX ADMIN — MIDODRINE HYDROCHLORIDE 10 MG: 5 TABLET ORAL at 07:43

## 2020-01-01 RX ADMIN — HEPARIN SODIUM 5000 UNITS: 1000 INJECTION INTRAVENOUS; SUBCUTANEOUS at 06:02

## 2020-01-01 RX ADMIN — PROPOFOL 20 MCG/KG/MIN: 10 INJECTION, EMULSION INTRAVENOUS at 15:06

## 2020-01-01 RX ADMIN — HEPARIN SODIUM 18 UNITS/KG/HR: 10000 INJECTION, SOLUTION INTRAVENOUS at 18:58

## 2020-01-01 RX ADMIN — PROPOFOL 20 MCG/KG/MIN: 10 INJECTION, EMULSION INTRAVENOUS at 13:25

## 2020-01-01 RX ADMIN — ACYCLOVIR SODIUM 700 MG: 50 INJECTION, SOLUTION INTRAVENOUS at 04:45

## 2020-01-01 RX ADMIN — PROPOFOL 15 MCG/KG/MIN: 10 INJECTION, EMULSION INTRAVENOUS at 04:40

## 2020-01-01 RX ADMIN — PROPOFOL 30 MCG/KG/MIN: 10 INJECTION, EMULSION INTRAVENOUS at 16:19

## 2020-01-01 RX ADMIN — PROPOFOL 25 MCG/KG/MIN: 10 INJECTION, EMULSION INTRAVENOUS at 20:53

## 2020-01-01 RX ADMIN — PROPOFOL 20 MCG/KG/MIN: 10 INJECTION, EMULSION INTRAVENOUS at 21:00

## 2020-01-01 RX ADMIN — ACETAMINOPHEN 650 MG: 325 TABLET ORAL at 20:41

## 2020-01-01 RX ADMIN — PIPERACILLIN AND TAZOBACTAM 3.38 G: 3; .375 INJECTION, POWDER, LYOPHILIZED, FOR SOLUTION INTRAVENOUS at 03:22

## 2020-01-01 RX ADMIN — ACYCLOVIR SODIUM 700 MG: 50 INJECTION, SOLUTION INTRAVENOUS at 13:47

## 2020-01-01 RX ADMIN — PROPOFOL 25 MCG/KG/MIN: 10 INJECTION, EMULSION INTRAVENOUS at 18:17

## 2020-01-01 RX ADMIN — SODIUM CHLORIDE, PRESERVATIVE FREE 10 ML: 5 INJECTION INTRAVENOUS at 09:22

## 2020-01-01 RX ADMIN — ENOXAPARIN SODIUM 40 MG: 40 INJECTION SUBCUTANEOUS at 08:39

## 2020-01-01 RX ADMIN — PANTOPRAZOLE SODIUM 40 MG: 40 INJECTION, POWDER, FOR SOLUTION INTRAVENOUS at 08:39

## 2020-01-01 RX ADMIN — PROPOFOL 20 MCG/KG/MIN: 10 INJECTION, EMULSION INTRAVENOUS at 21:55

## 2020-01-01 RX ADMIN — PROPOFOL 15 MCG/KG/MIN: 10 INJECTION, EMULSION INTRAVENOUS at 03:19

## 2020-01-01 RX ADMIN — MIDODRINE HYDROCHLORIDE 10 MG: 5 TABLET ORAL at 20:39

## 2020-01-01 RX ADMIN — LORAZEPAM 0.5 MG: 2 INJECTION INTRAMUSCULAR; INTRAVENOUS at 09:50

## 2020-01-01 RX ADMIN — ENOXAPARIN SODIUM 40 MG: 40 INJECTION SUBCUTANEOUS at 08:29

## 2020-01-01 RX ADMIN — PROPOFOL 20 MCG/KG/MIN: 10 INJECTION, EMULSION INTRAVENOUS at 00:54

## 2020-01-01 RX ADMIN — CEFAZOLIN SODIUM 1 G: 1 INJECTION, SOLUTION INTRAVENOUS at 07:01

## 2020-01-01 RX ADMIN — MIDODRINE HYDROCHLORIDE 10 MG: 5 TABLET ORAL at 13:02

## 2020-01-01 RX ADMIN — MIDAZOLAM HYDROCHLORIDE 2 MG: 1 INJECTION, SOLUTION INTRAMUSCULAR; INTRAVENOUS at 11:38

## 2020-01-01 RX ADMIN — ITRACONAZOLE 200 MG: 100 CAPSULE ORAL at 09:00

## 2020-01-01 RX ADMIN — ENOXAPARIN SODIUM 40 MG: 40 INJECTION SUBCUTANEOUS at 21:10

## 2020-01-01 RX ADMIN — VANCOMYCIN HYDROCHLORIDE 1500 MG: 10 INJECTION, POWDER, LYOPHILIZED, FOR SOLUTION INTRAVENOUS at 08:38

## 2020-01-01 RX ADMIN — PROPOFOL 15 MCG/KG/MIN: 10 INJECTION, EMULSION INTRAVENOUS at 16:09

## 2020-01-01 RX ADMIN — SODIUM CHLORIDE, PRESERVATIVE FREE 10 ML: 5 INJECTION INTRAVENOUS at 08:30

## 2020-01-01 RX ADMIN — HEPARIN SODIUM 17 UNITS/KG/HR: 10000 INJECTION, SOLUTION INTRAVENOUS at 06:23

## 2020-01-01 RX ADMIN — Medication 3 MG/HR: at 08:58

## 2020-01-01 RX ADMIN — SODIUM CHLORIDE, PRESERVATIVE FREE 10 ML: 5 INJECTION INTRAVENOUS at 08:39

## 2020-01-01 RX ADMIN — METOCLOPRAMIDE 5 MG: 5 INJECTION, SOLUTION INTRAMUSCULAR; INTRAVENOUS at 08:40

## 2020-01-01 RX ADMIN — PROPOFOL 17.5 MCG/KG/MIN: 10 INJECTION, EMULSION INTRAVENOUS at 04:42

## 2020-01-01 RX ADMIN — SODIUM CHLORIDE, PRESERVATIVE FREE 10 ML: 5 INJECTION INTRAVENOUS at 09:00

## 2020-01-01 RX ADMIN — PROPOFOL 25 MCG/KG/MIN: 10 INJECTION, EMULSION INTRAVENOUS at 06:17

## 2020-01-01 RX ADMIN — Medication 30 MG: at 07:19

## 2020-01-01 RX ADMIN — HEPARIN SODIUM 15 UNITS/KG/HR: 10000 INJECTION, SOLUTION INTRAVENOUS at 11:47

## 2020-01-01 RX ADMIN — PROPOFOL 15 MCG/KG/MIN: 10 INJECTION, EMULSION INTRAVENOUS at 07:00

## 2020-01-01 RX ADMIN — CEFTAROLINE FOSAMIL 600 MG: 600 POWDER, FOR SOLUTION INTRAVENOUS at 16:38

## 2020-01-01 RX ADMIN — PIPERACILLIN AND TAZOBACTAM 3.38 G: 3; .375 INJECTION, POWDER, LYOPHILIZED, FOR SOLUTION INTRAVENOUS at 04:15

## 2020-01-01 RX ADMIN — PROPOFOL 20 MCG/KG/MIN: 10 INJECTION, EMULSION INTRAVENOUS at 15:44

## 2020-01-01 RX ADMIN — PROPOFOL 20 MCG/KG/MIN: 10 INJECTION, EMULSION INTRAVENOUS at 08:24

## 2020-01-01 RX ADMIN — MIDODRINE HYDROCHLORIDE 10 MG: 5 TABLET ORAL at 17:48

## 2020-01-01 RX ADMIN — ACETAMINOPHEN 650 MG: 325 TABLET ORAL at 01:32

## 2020-01-01 RX ADMIN — Medication 25 MCG/HR: at 10:46

## 2020-01-01 RX ADMIN — PROPOFOL 25 MCG/KG/MIN: 10 INJECTION, EMULSION INTRAVENOUS at 13:02

## 2020-01-01 RX ADMIN — MIDODRINE HYDROCHLORIDE 10 MG: 5 TABLET ORAL at 14:00

## 2020-01-01 RX ADMIN — PROPOFOL 30 MCG/KG/MIN: 10 INJECTION, EMULSION INTRAVENOUS at 10:15

## 2020-01-01 RX ADMIN — ACETAMINOPHEN 650 MG: 325 TABLET ORAL at 23:24

## 2020-01-01 RX ADMIN — LORAZEPAM 0.5 MG: 2 INJECTION INTRAMUSCULAR at 09:50

## 2020-01-01 RX ADMIN — LEVOFLOXACIN 500 MG: 5 INJECTION, SOLUTION INTRAVENOUS at 20:03

## 2020-01-01 RX ADMIN — GLYCOPYRROLATE 0.2 MG: 0.2 INJECTION INTRAMUSCULAR; INTRAVENOUS at 16:07

## 2020-01-01 RX ADMIN — LORAZEPAM 1 MG: 2 INJECTION INTRAMUSCULAR at 16:21

## 2020-01-01 RX ADMIN — SODIUM CHLORIDE 1000 ML: 9 INJECTION, SOLUTION INTRAVENOUS at 11:06

## 2020-01-01 RX ADMIN — MIDAZOLAM HYDROCHLORIDE 2 MG: 2 INJECTION, SOLUTION INTRAMUSCULAR; INTRAVENOUS at 11:38

## 2020-01-01 RX ADMIN — PIPERACILLIN AND TAZOBACTAM 3.38 G: 3; .375 INJECTION, POWDER, LYOPHILIZED, FOR SOLUTION INTRAVENOUS at 10:45

## 2020-01-01 RX ADMIN — CEFTAROLINE FOSAMIL 600 MG: 600 POWDER, FOR SOLUTION INTRAVENOUS at 17:43

## 2020-01-01 RX ADMIN — PIPERACILLIN AND TAZOBACTAM 3.38 G: 3; .375 INJECTION, POWDER, LYOPHILIZED, FOR SOLUTION INTRAVENOUS at 11:30

## 2020-01-01 RX ADMIN — FUROSEMIDE 40 MG: 10 INJECTION, SOLUTION INTRAMUSCULAR; INTRAVENOUS at 10:37

## 2020-01-01 RX ADMIN — HEPARIN SODIUM 18 UNITS/KG/HR: 10000 INJECTION, SOLUTION INTRAVENOUS at 23:39

## 2020-01-01 RX ADMIN — MIDODRINE HYDROCHLORIDE 10 MG: 5 TABLET ORAL at 10:40

## 2020-01-01 RX ADMIN — ITRACONAZOLE 200 MG: 100 CAPSULE ORAL at 20:48

## 2020-01-01 RX ADMIN — ENOXAPARIN SODIUM 40 MG: 40 INJECTION SUBCUTANEOUS at 08:30

## 2020-01-01 RX ADMIN — PIPERACILLIN AND TAZOBACTAM 3.38 G: 3; .375 INJECTION, POWDER, LYOPHILIZED, FOR SOLUTION INTRAVENOUS at 20:27

## 2020-01-01 RX ADMIN — MIDODRINE HYDROCHLORIDE 10 MG: 5 TABLET ORAL at 13:12

## 2020-01-01 RX ADMIN — Medication 30 MG: at 06:17

## 2020-01-01 RX ADMIN — ITRACONAZOLE 200 MG: 100 CAPSULE ORAL at 22:32

## 2020-01-01 RX ADMIN — MIDODRINE HYDROCHLORIDE 10 MG: 5 TABLET ORAL at 17:00

## 2020-01-01 RX ADMIN — Medication 30 MG: at 08:29

## 2020-01-01 RX ADMIN — ACYCLOVIR SODIUM 700 MG: 50 INJECTION, SOLUTION INTRAVENOUS at 13:12

## 2020-01-01 RX ADMIN — PROPOFOL 30 MCG/KG/MIN: 10 INJECTION, EMULSION INTRAVENOUS at 12:05

## 2020-01-01 RX ADMIN — ITRACONAZOLE 200 MG: 100 CAPSULE ORAL at 08:52

## 2020-01-01 RX ADMIN — NOREPINEPHRINE BITARTRATE 5 MCG/MIN: 1 INJECTION, SOLUTION, CONCENTRATE INTRAVENOUS at 08:40

## 2020-01-01 RX ADMIN — ENOXAPARIN SODIUM 40 MG: 40 INJECTION SUBCUTANEOUS at 21:09

## 2020-01-01 RX ADMIN — MORPHINE SULFATE 4 MG: 4 INJECTION, SOLUTION INTRAMUSCULAR; INTRAVENOUS at 16:08

## 2020-01-01 RX ADMIN — MIDODRINE HYDROCHLORIDE 10 MG: 5 TABLET ORAL at 09:00

## 2020-01-01 RX ADMIN — HEPARIN SODIUM 15 UNITS/KG/HR: 10000 INJECTION, SOLUTION INTRAVENOUS at 06:17

## 2020-01-01 RX ADMIN — ENOXAPARIN SODIUM 40 MG: 40 INJECTION SUBCUTANEOUS at 20:42

## 2020-01-01 RX ADMIN — ITRACONAZOLE 200 MG: 100 CAPSULE ORAL at 13:47

## 2020-01-01 RX ADMIN — PROPOFOL 15 MCG/KG/MIN: 10 INJECTION, EMULSION INTRAVENOUS at 03:08

## 2020-01-01 RX ADMIN — FUROSEMIDE 40 MG: 10 INJECTION, SOLUTION INTRAMUSCULAR; INTRAVENOUS at 11:25

## 2020-01-01 RX ADMIN — Medication 10 ML: at 08:48

## 2020-01-01 RX ADMIN — ALBUTEROL SULFATE 2.5 MG: 2.5 SOLUTION RESPIRATORY (INHALATION) at 19:53

## 2020-01-01 RX ADMIN — CEFTAROLINE FOSAMIL 600 MG: 600 POWDER, FOR SOLUTION INTRAVENOUS at 04:19

## 2020-01-01 RX ADMIN — ITRACONAZOLE 200 MG: 100 CAPSULE ORAL at 13:30

## 2020-01-01 RX ADMIN — PROPOFOL 17.5 MCG/KG/MIN: 10 INJECTION, EMULSION INTRAVENOUS at 01:24

## 2020-01-01 RX ADMIN — SODIUM CHLORIDE, PRESERVATIVE FREE 10 ML: 5 INJECTION INTRAVENOUS at 21:34

## 2020-01-01 RX ADMIN — Medication 30 MG: at 04:26

## 2020-01-01 RX ADMIN — HEPARIN SODIUM 15 UNITS/KG/HR: 10000 INJECTION, SOLUTION INTRAVENOUS at 18:16

## 2020-01-01 RX ADMIN — MIDODRINE HYDROCHLORIDE 10 MG: 5 TABLET ORAL at 21:32

## 2020-01-01 RX ADMIN — HEPARIN SODIUM 15 UNITS/KG/HR: 10000 INJECTION, SOLUTION INTRAVENOUS at 18:58

## 2020-01-01 RX ADMIN — PROPOFOL 15 MCG/KG/MIN: 10 INJECTION, EMULSION INTRAVENOUS at 12:33

## 2020-01-01 RX ADMIN — VANCOMYCIN HYDROCHLORIDE 1500 MG: 100 INJECTION, POWDER, LYOPHILIZED, FOR SOLUTION INTRAVENOUS at 10:24

## 2020-01-01 RX ADMIN — CEFTAROLINE FOSAMIL 600 MG: 600 POWDER, FOR SOLUTION INTRAVENOUS at 05:19

## 2020-01-01 RX ADMIN — ITRACONAZOLE 200 MG: 100 CAPSULE ORAL at 21:30

## 2020-01-01 RX ADMIN — HEPARIN SODIUM 15 UNITS/KG/HR: 10000 INJECTION, SOLUTION INTRAVENOUS at 12:56

## 2020-01-01 ASSESSMENT — PULMONARY FUNCTION TESTS
PIF_VALUE: 37
PIF_VALUE: 37
PIF_VALUE: 40
PIF_VALUE: 27
PIF_VALUE: 34
PIF_VALUE: 33
PIF_VALUE: 30
PIF_VALUE: 36
PIF_VALUE: 29
PIF_VALUE: 28
PIF_VALUE: 42
PIF_VALUE: 40
PIF_VALUE: 45
PIF_VALUE: 31
PIF_VALUE: 36
PIF_VALUE: 34
PIF_VALUE: 39
PIF_VALUE: 41
PIF_VALUE: 38
PIF_VALUE: 38
PIF_VALUE: 35
PIF_VALUE: 44
PIF_VALUE: 33
PIF_VALUE: 42
PIF_VALUE: 45
PIF_VALUE: 25
PIF_VALUE: 27
PIF_VALUE: 46
PIF_VALUE: 35
PIF_VALUE: 31
PIF_VALUE: 29
PIF_VALUE: 45
PIF_VALUE: 26
PIF_VALUE: 30
PIF_VALUE: 30
PIF_VALUE: 39
PIF_VALUE: 42
PIF_VALUE: 33
PIF_VALUE: 41
PIF_VALUE: 39
PIF_VALUE: 28
PIF_VALUE: 43
PIF_VALUE: 45
PIF_VALUE: 28
PIF_VALUE: 41
PIF_VALUE: 39
PIF_VALUE: 36
PIF_VALUE: 45
PIF_VALUE: 45
PIF_VALUE: 39
PIF_VALUE: 35
PIF_VALUE: 38
PIF_VALUE: 36
PIF_VALUE: 30
PIF_VALUE: 36
PIF_VALUE: 35
PIF_VALUE: 28
PIF_VALUE: 24
PIF_VALUE: 41
PIF_VALUE: 36
PIF_VALUE: 46
PIF_VALUE: 36
PIF_VALUE: 38
PIF_VALUE: 30
PIF_VALUE: 39
PIF_VALUE: 40
PIF_VALUE: 44
PIF_VALUE: 45
PIF_VALUE: 46
PIF_VALUE: 42
PIF_VALUE: 32
PIF_VALUE: 39
PIF_VALUE: 46
PIF_VALUE: 34
PIF_VALUE: 46
PIF_VALUE: 38
PIF_VALUE: 29
PIF_VALUE: 45
PIF_VALUE: 36
PIF_VALUE: 22
PIF_VALUE: 26
PIF_VALUE: 25
PIF_VALUE: 37
PIF_VALUE: 45
PIF_VALUE: 33
PIF_VALUE: 30
PIF_VALUE: 46
PIF_VALUE: 37
PIF_VALUE: 30
PIF_VALUE: 33
PIF_VALUE: 44
PIF_VALUE: 26
PIF_VALUE: 37
PIF_VALUE: 30
PIF_VALUE: 31
PIF_VALUE: 24
PIF_VALUE: 36
PIF_VALUE: 41
PIF_VALUE: 42
PIF_VALUE: 39
PIF_VALUE: 25
PIF_VALUE: 27
PIF_VALUE: 23
PIF_VALUE: 46
PIF_VALUE: 43
PIF_VALUE: 29
PIF_VALUE: 39
PIF_VALUE: 37
PIF_VALUE: 30
PIF_VALUE: 40
PIF_VALUE: 38
PIF_VALUE: 37
PIF_VALUE: 23
PIF_VALUE: 45
PIF_VALUE: 31
PIF_VALUE: 39

## 2020-01-01 ASSESSMENT — PAIN SCALES - GENERAL
PAINLEVEL_OUTOF10: 0
PAINLEVEL_OUTOF10: 0
PAINLEVEL_OUTOF10: 6
PAINLEVEL_OUTOF10: 0
PAINLEVEL_OUTOF10: 5
PAINLEVEL_OUTOF10: 0
PAINLEVEL_OUTOF10: 8
PAINLEVEL_OUTOF10: 0

## 2020-01-01 ASSESSMENT — ENCOUNTER SYMPTOMS: TACHYPNEA: 1

## 2020-12-01 PROBLEM — J96.02 ACUTE HYPERCAPNIC RESPIRATORY FAILURE (HCC): Status: ACTIVE | Noted: 2020-01-01

## 2020-12-02 PROBLEM — E66.01 MORBID OBESITY (HCC): Status: ACTIVE | Noted: 2020-01-01

## 2020-12-02 PROBLEM — G47.33 OSA (OBSTRUCTIVE SLEEP APNEA): Status: ACTIVE | Noted: 2020-01-01

## 2020-12-02 PROBLEM — L03.116 CELLULITIS OF LEFT LEG: Status: ACTIVE | Noted: 2020-01-01

## 2020-12-02 NOTE — PROGRESS NOTES
Pharmacy Vancomycin Consult     Vancomycin Day: unknown duration at OSH    Temp max:  afebrile    Recent Labs     12/02/20  0537   BUN 30*       Recent Labs     12/02/20  0537   CREATININE 1.41*       Recent Labs     12/02/20  0537   WBC 11.5*         Intake/Output Summary (Last 24 hours) at 12/2/2020 1215  Last data filed at 12/2/2020 0800  Gross per 24 hour   Intake 187 ml   Output 600 ml   Net -413 ml       Culture Date      Source                       Results  12.02                  MRSA Swab               Pending  12.02                  Blood x1                     Pending  12.02                  Resp Panel                 Nothing detected    Ht Readings from Last 1 Encounters:   12/02/20 5' 9\" (1.753 m)        Wt Readings from Last 1 Encounters:   12/02/20 (!) 582 lb 0.2 oz (264 kg)         Body mass index is 85.95 kg/m². Estimated Creatinine Clearance: 156 mL/min (A) (based on SCr of 1.41 mg/dL (H)). Random: 40.4 mcg/mL    Assessment/Plan:  Patient transferred overnight from [facility] on vancomycin. Duration of therapy, dose, and timing of last administration unclear from provided paperwork. Random level done this morning is well above desired range of 15-20 mcg/mL with what appears to be new ELIECER. Will draw a level 12H from previous labs to better assess clearance and redose if necessary.     Fareed Danielson, PharmD BCPS [facility]  12/2/2020 12:15 PM

## 2020-12-02 NOTE — PLAN OF CARE
Nutrition Problem #1: Inadequate oral intake  Intervention: Food and/or Nutrient Delivery: Continue NPO, Start Tube Feeding  Nutritional Goals: EN intake to meet % of estimated nutrition needs

## 2020-12-02 NOTE — H&P
Critical Care - History and Physical Examination    Patient's name:  Mónica Gregory  Medical Record Number: [id number]  Patient's account/billing number: [de-identified]  Patient's YOB: 1985  Age: 37 y.o. Date of Admission: 12/2/2020  4:33 AM  Reason of ICU admission:   Date of History and Physical Examination: 12/2/2020      Primary Care Physician: No primary care provider on file. Attending Physician:    Code Status: Full Code    Chief complaint: LLE cellulitis    Reason for ICU admission: hypoxic hypercapnic respiratory failure      History Of Present Illness:   History was obtained from chart review. Mónica Gregory is a 37 y.o. with PMH of ERA on BiPAP, morbid obesity who presented initially to [facility] with complaint of pain and swelling of the left lower extremity on 11/27/2020. Patient had stated that this pain started as early as June of this year. Patient did have leukocytosis of 14.1, remaining BMP and CBC unremarkable on initial presentation. The cellulitis was demarcated on patient's left leg going up to mid thigh, however has apparently spread up into suprapubic region and spreading across lower abdomen, abdominal pannus. Patient denied any trauma or injury to the left lower extremity or open wounds. X-rays were obtained at [facility] [place] of left lower extremity and were negative for osteomyelitis. Venous dopplers in bilateral LE were negative. Patient denied any chest pain, cough, fever, chills. Patient was started on IV Zosyn and vancomycin. At some point during the evening yesterday 12/1/2020, patient began having respiratory distress. Patient alerted medical team that he uses BiPAP at night, however patient did not improve with BiPAP. ABG was taken at that time which showed respiratory acidosis pH 7.19/CO2 92.8/O2 70/HCO3 34.2.   Patient was intubated and sedated at that point and decision made to transfer to [place].    Of note, patient's sister who he lives with has COVID-19. He was tested for COVID at [facility] which was negative. Troponins were elevated x2 - .22 and.24. 2D echo was obtained which showed difficult study quality, estimated EF 55-60. Patient did have elevated D-dimer but was unable to get CT chest due to body size. Past Medical History:  No past medical history on file. Past Surgical History:  No past surgical history on file. Allergies: Allergies not on file      Home Medications:   Prior to Admission medications    Not on File       Social History:   TOBACCO:   has no history on file for tobacco.  ETOH:   has no history on file for alcohol. DRUGS:  has no history on file for drug. OCCUPATION:      Family History:   No family history on file. REVIEW OF SYSTEMS (ROS):  Unable to be performed as patient is sedated      Physical Exam:    Vitals: Pulse 88   Resp 19   Ht 5' 7\" (1.702 m)     Body weight:   Wt Readings from Last 3 Encounters:   No data found for [facility]       Body Mass Index : There is no height or weight on file to calculate BMI. PHYSICAL EXAMINATION :  Constitutional: Intubated, sedated. Morbidly obese (BMI 86)  EENT: PERRLA, EOMI, sclera clear, anicteric, oropharynx clear  Neck: Large neck, supple, symmetrical, trachea midline, no adenopathy, thyroid symmetric  Respiratory: Intubated, equal air entry bilaterally, no rales, rhonchi  Cardiovascular: regular rate and rhythm, normal S1, S2, no murmur noted, pulses identified with Doppler   Abdomen: Cellulitis spreading across lower abdomen  Neurological: Sedated  Extremities: Left lower extremity cellulitis, warm to touch, erythematous with excoriations on ankle      Laboratory findings:-    CBC: No results for input(s): WBC, HGB, PLT in the last 72 hours. BMP:  No results for input(s): NA, K, CL, CO2, BUN, CREATININE, GLUCOSE in the last 72 hours. S. Calcium:No results for input(s): CALCIUM in the last 72 hours.   S. Ionized Calcium:No results for input(s): IONCA in the last 72 hours. S. Magnesium:No results for input(s): MG in the last 72 hours. S. Phosphorus:No results for input(s): PHOS in the last 72 hours. S. Glucose:No results for input(s): POCGLU in the last 72 hours. Glycosylated hemoglobin A1C: No results for input(s): LABA1C in the last 72 hours. INR: No results for input(s): INR in the last 72 hours. Hepatic functions: No results for input(s): ALKPHOS, ALT, AST, PROT, BILITOT, BILIDIR, LABALBU in the last 72 hours. Pancreatic functions:No results for input(s): LACTA, AMYLASE in the last 72 hours. S. Lactic Acid: No results for input(s): LACTA in the last 72 hours. Cardiac enzymes:No results for input(s): CKTOTAL, CKMB, CKMBINDEX, TROPONINI in the last 72 hours. BNP:No results for input(s): BNP in the last 72 hours. Lipid profile: No results for input(s): CHOL, TRIG, HDL, LDLCALC in the last 72 hours.     Invalid input(s): LDL  Blood Gases: No results found for: PH, PCO2, PO2, HCO3, O2SAT  Thyroid functions: No results found for: TSH     Urinalysis:     Microbiology:   Cultures during this admission:     Blood cultures:                 [] None drawn      [x] Negative             []  Positive (Details:  )  Urine Culture:                   [] None drawn      [x] Negative             []  Positive (Details:  )  Sputum Culture:               [x] None drawn       [] Negative             []  Positive (Details:  )   Endotracheal aspirate:     [x] None drawn       [] Negative             []  Positive (Details:  )         -----------------------------------------------------------------  Radiological reports:     CXR: Pending    Last Echocardiogram findings: EF 55-60         Assessment and Plan     Patient Active Problem List   Diagnosis    [facility]    Cellulitis of left leg    ERA (obstructive sleep apnea)    Morbid obesity ([address]           Plan:  Neuro:   Sedated with propofol    Resp:   Acute hypoxic hypercapnic respiratory failure   Intubated, mechanical ventilation - vent settings 16/530/5/70   Follow-up ABG, CXR   COVID exposure, negative rapid test at [facility]. Repeat COVID test, respiratory panel. Unable to get CT chest due to body habitus    CV:   Regular rate, rhythm. Pulses present. BP stable   Troponinemia at [facility]. Trend x2   Echo at [facility] - EF 55-60%, difficult study to assess    ID:   LLE cellulitis spreading upwards across abdominal pannus, inferior abdomen   Ancef, vancomycin   Follow-up blood cultures, CXR, urine culture   Afebrile.   CBC daily    Heme:  · Leukocytosis secondary to acute cellulitis  · CBC daily    GI/Nutrition:   NPO    Prophylaxis:   DVT: Lovenox 40 mg subQ daily   GI: protonix 40 mg daily    Dispo:   Admit to ICU      Selene Carpenter MD   Internal Medicine - PGY-2  Intensive Care Unit  12/2/2020, 5:22 AM

## 2020-12-02 NOTE — PROGRESS NOTES
Comprehensive Nutrition Assessment    Type and Reason for Visit:  Initial, Consult(TF Order/Mgmt)    Nutrition Recommendations/Plan: Continue NPO. Start tube feedings via NG tube with immune enhancing formula (Pivot 1.5 Yemi) with a goal rate of 70 ml/hr, continuous, with Propofol running at 31.7 ml/hr - tube feeding to provide 2520 kcal, 158 g protein/d. Monitor tube feeding tolerance/adequacy. Nutrition Assessment:  Consulted for tube feeding initiation. Patient intubated and sedated with Propofol running at 31.7 ml/hr, which provides 837 kcal/d. Patient with NG tube in place, which is clamped at this time. Abdominal distention and hypoactive bowel sounds noted. No weight history available per EHR. Malnutrition Assessment:  Malnutrition Status:  Insufficient data    Context:  Acute Illness     Findings of the 6 clinical characteristics of malnutrition:  Energy Intake:  Unable to assess  Weight Loss:  Unable to assess     Body Fat Loss:  No significant body fat loss     Muscle Mass Loss:  No significant muscle mass loss    Fluid Accumulation:  7 - Moderate to Severe- Generalized   Strength:  Not Performed    Estimated Daily Nutrient Needs:  Energy (kcal):  9464-5338 kcal/d (12-13 kcal/kg CBW); Weight Used for Energy Requirements: Current (264 kg)     Protein (g):  146-183 g protein/d (2.0-2.5 g/kg IBW); Weight Used for Protein Requirements: Ideal (73 kg)          Nutrition Related Findings:  Labs reviewed. Meds reviewed. Abdominal distention and hypoactive bowel sounds. +3 non-pitting generalized edema. Date of last BM unknown.       Wounds:  Cellulitis       Current Nutrition Therapies:    Current Tube Feeding (TF) Orders:  · Feeding Route: Nasogastric  · Formula: Immune Enhancing(Pivot 1.5 Yemi)  · Schedule: Continuous  · Goal TF & Flush Orders Provides: @ 70 ml/hr, continuous = 2520 kcal, 158 g protein    Additional Calorie Sources:   Propofol @ 31.7 ml/hr = 837 kcal/d    Anthropometric Measures:  · Height: 5' 9\" (175.3 cm)  · Current Body Weight: 582 lb 0.2 oz (264 kg)   · Admission Body Weight: 264 lb (119.7 kg)(Stated per EMS)    · Usual Body Weight: Unknown     · Ideal Body Weight: 160 lbs; % Ideal Body Weight 363.8%  · BMI: 85.9  · Adjusted Body Weight: No Adjustment   · BMI Categories: Obese Class 3 (BMI 40.0 or greater)       Nutrition Diagnosis:   · Inadequate oral intake related to impaired respiratory function as evidenced by NPO or clear liquid status due to medical condition, intubation, and need for enteral nutrition    Nutrition Interventions:   Food and/or Nutrient Delivery:  Continue NPO, Start Tube Feeding  Nutrition Education/Counseling:  No recommendation at this time, Education not indicated     Goals:  EN intake to meet % of estimated nutrition needs       Nutrition Monitoring and Evaluation:   Behavioral-Environmental Outcomes:  None Identified   Food/Nutrient Intake Outcomes:  Enteral Nutrition Intake/Tolerance  Physical Signs/Symptoms Outcomes:  Biochemical Data, GI Status, Fluid Status or Edema, Hemodynamic Status, Nutrition Focused Physical Findings, Skin, Weight     Discharge Planning:     Too soon to determine     Electronically signed by Louie Medellin RD, LD on 12/2/20 at 11:53 AM EST    Contact: [phone]

## 2020-12-03 NOTE — PROGRESS NOTES
Pharmacy Vancomycin Consult     Vancomycin Day: 3  Current Dosing: by levels    Temp max:  afebrile    Recent Labs     12/02/20  0537 12/03/20  0703   BUN 30* 39*       Recent Labs     12/02/20  0537 12/03/20  0703   CREATININE 1.41* 1.51*       Recent Labs     12/02/20  0537 12/03/20  0703   WBC 11.5* 10.8         Intake/Output Summary (Last 24 hours) at 12/3/2020 1215  Last data filed at 12/3/2020 1157  Gross per 24 hour   Intake 1897 ml   Output 1550 ml   Net 347 ml       Culture Date      Source                       Results  12.02                  Resp Panel                 Not detected  12.02                  MRSA Swab               Negative  12.02                  Blood x1                     No growth    Ht Readings from Last 1 Encounters:   12/02/20 5' 9\" (1.753 m)        Wt Readings from Last 1 Encounters:   12/02/20 (!) 582 lb 0.2 oz (264 kg)         Body mass index is 85.95 kg/m². Estimated Creatinine Clearance: 146 mL/min (A) (based on SCr of 1.51 mg/dL (H)). Random: 20.4 mcg/mL    Assessment/Plan:  Level remains elevated > 24H after dose with an increased SCr. Will continue to hold therapy and reevaluate tomorrow morning to determine if another dose is needed.     Lazarus Isles, PharmD BCPS [facility]  12/3/2020 12:15 PM

## 2020-12-03 NOTE — PROGRESS NOTES
INTENSIVE CARE UNIT  Resident Physician Progress Note    Patient - Gatito Watt  Date of Admission -  2020  4:33 AM  Date of Evaluation -  12/3/2020  Room and Bed Number -  0105/0105-01   Hospital Day - 1      SUBJECTIVE:     OVERNIGHT EVENTS:    No acute issues overnight. Creatinine still elevated. Patient did not tolerate weaning today. Will attempt again tomorrow. 40 X 5, 33 X 530. Zosyn MD'ed. Continue vancomycin. Left lower extremity cellulitis improving. Renal ultrasound showing no evidence of hydronephrosis or chronic kidney disease. Await urine sodium and further labs. Creatinine staying up.       TODAY:      AWAKE & FOLLOWING COMMANDS:  [] No   [x] Yes    SECRETIONS Amount:  [] Small [] Moderate  [] Large  [x] None  Color:     [] White [] Colored  [] Bloody    SEDATION:  RAAS Score:  [x] Propofol gtt  [] Versed gtt  [] Ativan gtt   [] No Sedation    PARALYZED:  [x] No    [] Yes    VASOPRESSORS:  [x] No    [] Yes  [] Levophed [] Dopamine [] Vasopressin  [] Dobutamine [] Phenylephrine [] Epinephrine      OBJECTIVE:     VITAL SIGNS:  /66   Pulse 111   Temp 98.5 °F (36.9 °C) (Axillary)   Resp 27   Ht 5' 9\" (1.753 m)   Wt (!) 582 lb 0.2 oz (264 kg)   SpO2 94%   BMI 85.95 kg/m²   Tmax over 24 hours:  Temp (24hrs), Av.6 °F (37 °C), Min:98 °F (36.7 °C), Max:99.2 °F (37.3 °C)      Patient Vitals for the past 8 hrs:   Temp Temp src Pulse Resp SpO2   20 0857 -- -- -- 27 94 %   20 0850 -- -- 111 25 94 %   20 0600 -- -- 112 28 --   20 0500 -- -- 103 23 --   20 0400 98.5 °F (36.9 °C) Axillary 105 23 --   20 0300 -- -- 111 27 --   20 0200 -- -- 105 24 --         Intake/Output Summary (Last 24 hours) at 12/3/2020 0906  Last data filed at 12/3/2020 0848  Gross per 24 hour   Intake 1678 ml   Output 1500 ml   Net 178 ml     Date 20 0000 - 20 2359   Shift 8541-6184 6513-2989 1579-5387 24 Hour Total   INTAKE   I.V.(mL/kg)  415(1.6) 415(1.6)   NG/GT(mL/kg) 633(2.4) 84(0.3)  717(2.7)   Shift Total(mL/kg) 633(2.4) 499(1.9)  1132(4.3)   OUTPUT   Urine(mL/kg/hr) 800(0.4)   800   Shift Total(mL/kg) 800(3)   800(3)   Weight (kg) 264 264 264 264     Wt Readings from Last 3 Encounters:   12/02/20 (!) 582 lb 0.2 oz (264 kg)     Body mass index is 85.95 kg/m². PHYSICAL EXAM:  Constitutional: Intubated, sedated. Morbidly obese (BMI 86)  EENT: PERRLA, EOMI, sclera clear, anicteric, oropharynx clear  Neck: Large neck, supple, symmetrical, trachea midline, no adenopathy, thyroid symmetric  Respiratory: Intubated, equal air entry bilaterally, no rales, rhonchi  Cardiovascular: regular rate and rhythm, normal S1, S2, no murmur noted, pulses identified with Doppler   Abdomen: Cellulitis spreading across lower abdomen  Neurological: Sedated  Extremities: Left lower extremity swelling which is improving.     MEDICATIONS:  Scheduled Meds:   sodium chloride flush  10 mL Intravenous 2 times per day    pantoprazole  40 mg Intravenous Daily    And    sodium chloride (PF)  10 mL Intravenous Daily    enoxaparin  40 mg Subcutaneous BID    vancomycin (VANCOCIN) intermittent dosing (placeholder)   Other RX Placeholder     Continuous Infusions:   propofol 15 mcg/kg/min (12/03/20 0735)     PRN Meds:   sodium chloride flush, 10 mL, PRN  acetaminophen, 650 mg, Q6H PRN    Or  acetaminophen, 650 mg, Q6H PRN  polyethylene glycol, 17 g, Daily PRN  promethazine, 12.5 mg, Q6H PRN    Or  ondansetron, 4 mg, Q6H PRN        SUPPORT DEVICES: [x] Ventilator [] BIPAP  [] Nasal Cannula [] Room Air    VENT SETTINGS (Comprehensive) (if applicable):    Vent Information  $Ventilation: $Subsequent Day  Vent Type: Servo i  Vent Mode: PRVC  Vt Ordered: 530 mL  Rate Set: 20 bmp  FiO2 : 40 %  SpO2: 94 %  SpO2/FiO2 ratio: 235  Sensitivity: 5  PEEP/CPAP: 5  I Time/ I Time %: 0.9 s  Humidification Source: [facility]  Additional Respiratory  Assessments  Pulse: 111  Resp: 27  SpO2: 94 %  $End Tidal CO2: 42  Position: Semi-Moncada's  Humidification Source: HME  Oral Care Completed?: Yes  Oral Care: Teeth brushed, Mouth swabbed, Mouth suctioned  Subglottic Suction Done?: Yes    ABGs:   Lab Results   Component Value Date    NLC8DJZ 34 12/02/2020    FIO2 40.0 12/02/2020         DATA:  Complete Blood Count:   Recent Labs     12/02/20  0537 12/03/20  0703   WBC 11.5* 10.8   RBC 3.98* 4.14*   HGB 11.4* 11.7*   HCT 39.0* 39.1*   MCV 98.0 94.4   MCH 28.6 28.3   MCHC 29.2 29.9   RDW 15.1* 15.6*    374   MPV 10.7 10.3        Last 3 Blood Glucose:   Recent Labs     12/02/20  0537 12/03/20  0703   GLUCOSE 97 143*        PT/INR:  No results found for: PROTIME, INR  PTT:  No results found for: APTT, PTT    Comprehensive Metabolic Profile:   Recent Labs     12/02/20  0537 12/03/20  0703    144   K 3.9 3.9   CL 99 101   CO2 30 32*   BUN 30* 39*   CREATININE 1.41* 1.51*   GLUCOSE 97 143*   CALCIUM 8.7 8.7   PROT 6.7 7.1   LABALBU 2.3* 2.4*   BILITOT 0.46 0.39   ALKPHOS 129 117   AST 43* 33   ALT 41 31      Magnesium: No results found for: MG  Phosphorus: No results found for: PHOS  Ionized Calcium: No results found for: CAION     Urinalysis:   Lab Results   Component Value Date    NITRU NEGATIVE 12/02/2020    COLORU DARK YELLOW 12/02/2020    PHUR 5.0 12/02/2020    WBCUA 5 TO 10 12/02/2020    RBCUA 0 TO 2 12/02/2020    MUCUS NOT REPORTED 12/02/2020    TRICHOMONAS NOT REPORTED 12/02/2020    YEAST NOT REPORTED 12/02/2020    BACTERIA NOT REPORTED 12/02/2020    SPECGRAV 1.021 12/02/2020    LEUKOCYTESUR SMALL 12/02/2020    UROBILINOGEN Normal 12/02/2020    BILIRUBINUR NEGATIVE 12/02/2020    GLUCOSEU NEGATIVE 12/02/2020    KETUA TRACE 12/02/2020    AMORPHOUS NOT REPORTED 12/02/2020       HgBA1c:  No results found for: LABA1C  TSH:  No results found for: TSH  Lactic Acid:   Lab Results   Component Value Date    LACTA NOT REPORTED 12/03/2020    LACTA NOT REPORTED 12/02/2020      Troponin: No results for input(s): TROPONINI in the last 72 hours. ASSESSMENT:     Patient Active Problem List    Diagnosis Date Noted    Cellulitis of left leg 12/02/2020    ERA (obstructive sleep apnea) 12/02/2020    Morbid [facility] 12/02/2020    Acute hypercapnic respiratory failure ([facility] 12/01/2020          PLAN:     WEAN PER PROTOCOL:  [] No   [x] Yes  [] N/A    ICU PROPHYLAXIS:  Stress ulcer:  [x] PPI Agent  [] L5Aiica [] Sucralfate  [] Other:  VTE:   [x] Enoxaparin  [] Unfract. Heparin Subcut  [] EPC Cuffs    NUTRITION:  [] NPO [x] Tube Feeding      [] TPN  [] PO    HOME MEDS RECONCILED: [x] No  [] Yes    CONSULTATION NEEDED:  [x] No  [] Yes    FAMILY UPDATED:    [] No  [] Yes    TRANSFER OUT OF ICU:   [x] No  [] Yes      Neuro:  · Sedated with propofol     Resp:  · Acute hypoxic hypercapnic respiratory failure  · Intubated, mechanical ventilation - vent settings 33/530/5/40  · Follow-up ABG, CXR  · COVID exposure, negative rapid test at [facility]. Repeat COVID test, respiratory panel. Unable to get CT chest due to body habitus. · Unable to tolerate weaning today. Will try again tomorrow.     CV:  · Regular rate, rhythm. Pulses present. BP stable  · Troponinemia at [facility]. Trend x2  · Echo at [facility] [place] - EF 55-60%, difficult study to assess     ID:  · LLE possible cellulitis, continue vancomycin, follow cultures.     Heme:  · Leukocytosis secondary to acute cellulitis  · CBC daily     GI/Nutrition:  · NPO    Renal:  ELIECER worsening over the last two days.   Renal ultrasound normal.  Will have to consult nephrology if creatinine fails to normalize.     Prophylaxis:  · DVT: Lovenox 40 mg subQ daily  · GI: protonix 40 mg daily     Dispo:  · Admit to ICU    Gonzalo Barboza MD       Critical Care Resident PGY-3  12/3/2020, 11:48 AM

## 2020-12-04 NOTE — PLAN OF CARE
Problem: Restraint Use - Nonviolent/Non-Self-Destructive Behavior:  Goal: Absence of restraint-related injury  Description: Absence of restraint-related injury  Outcome: Ongoing     Problem: Skin Integrity:  Goal: Will show no infection signs and symptoms  Description: Will show no infection signs and symptoms  Outcome: Ongoing     Problem: Nutrition  Goal: Optimal nutrition therapy  Description: Nutrition Problem #1: Inadequate oral intake  Intervention: Food and/or Nutrient Delivery: Continue NPO, Start Tube Feeding  Nutritional Goals: EN intake to meet % of estimated nutrition needs     12/4/2020 1841 by Arya Chavez RN  Outcome: Ongoing     Problem: Confusion - Acute:  Goal: Absence of continued neurological deterioration signs and symptoms  Description: Absence of continued neurological deterioration signs and symptoms  Outcome: Ongoing     Problem: Injury - Risk of, Physical Injury:  Goal: Absence of physical injury  Description: Absence of physical injury  Outcome: Ongoing     Problem: Mood - Altered:  Goal: Mood stable  Description: Mood stable  Outcome: Ongoing     Problem: Psychomotor Activity - Altered:  Goal: Absence of psychomotor disturbance signs and symptoms  Description: Absence of psychomotor disturbance signs and symptoms  Outcome: Ongoing     Problem: Sensory Perception - Impaired:  Goal: Demonstrations of improved sensory functioning will increase  Description: Demonstrations of improved sensory functioning will increase  Outcome: Ongoing     Problem: Sleep Pattern Disturbance:  Goal: Appears well-rested  Description: Appears well-rested  Outcome: Ongoing     Problem: Restraint Use - Nonviolent/Non-Self-Destructive Behavior:  Goal: Absence of restraint indications  Description: Absence of restraint indications  Outcome: Not Met This Shift  Note: Patient remained in restraints entire shift.

## 2020-12-04 NOTE — CARE COORDINATION
Case Management Initial Discharge Plan  [facility] Staff             Met with: Mother to discuss discharge plans. Information verified: address, contacts, phone number, , insurance No, Deloris Goodrich from [employer] will be meeting with pt's sister, Artemio Landry on Tuesday, 20. Emergency Contact/Next of Kin name & number: hTomas Christianson, drew at [phone] and Father Nichole Gannon at [phone]. PCP: No primary care provider on file. Date of last visit: none    Insurance Provider: none    Discharge Planning    Living Arrangements:      Support Systems:       Home has 1 stories  2 stairs to climb to get into front door,   Location of bedroom/bathroom in home main    Patient able to perform ADL's:Independent    Current Services (outpatient & in home) none  DME equipment: none, just grab bar at toilet and tub, but unable to go in tub, just shower. DME provider: n/a    Receiving oral anticoagulation therapy? No    If indicated:   Physician managing anticoagulation treatment: n/a  Where does patient obtain lab work for ATC treatment? n/a      Potential Assistance Needed:       Patient agreeable to home care: Yes  Freedom of choice provided:  No    Prior SNF/Rehab Placement and Facility: none  Agreeable to SNF/Rehab: No  [place] of choice provided: No     Evaluation: no    Expected Discharge date:       Patient expects to be discharged to: Follow Up Appointment: Best Day/ Time:      Transportation provider: Tiny Biggs at [phone]. Transportation arrangements needed for discharge: No    Readmission Risk              Risk of Unplanned Readmission:        12             Does patient have a readmission risk score greater than 14?: No  If yes, follow-up appointment must be made within 7 days of discharge. Goals of Care:       Discharge Plan:   Pending Medicaid. Deloris Goodrich from [employer] assisting family. He will meet with pt's sister on Tuesday.    Continue to monitor progress and needs for pt. regarding [facility] [place], [facility].

## 2020-12-04 NOTE — PLAN OF CARE
Nutrition Problem #1: Inadequate protein intake  Intervention: Food and/or Nutrient Delivery: Continue NPO, Continue Current Tube Feeding  Nutritional Goals: EN intake to meet % of estimated nutrition needs

## 2020-12-04 NOTE — FLOWSHEET NOTE
Assessment: Patient was intubated and unresponsive when  visited. Intervention:  provided presence and prayed at patient's bedside. Follow up visits recommended for more prayers and support.        12/04/20 1013   Encounter Summary   Services provided to: Patient   Support System Family members   Continue Visiting   (12/04/2020)   Complexity of Encounter High   Length of Encounter 15 minutes   Routine   Type Initial   Intervention Prayer;[place]

## 2020-12-04 NOTE — PROGRESS NOTES
Comprehensive Nutrition Assessment    Type and Reason for Visit:  Reassess    Nutrition Recommendations/Plan: Continue NPO. Continue tube feedings via NG tube with immune enhancing formula (Pivot 1.5 Yemi) at goal rate of 70 ml/hr, with Propofol @ 23.8 ml/hr - tube feedings to provide 2520 kcal, 158 g protein/d. Monitor tube feeding tolerance/adequacy. Nutrition Assessment:  Patient remains intubated and sedated with Propofol running at 23.8 ml/hr, which provides 628 kcal/d. RN reports that patient is tolerating tube feedings via NG tube at goal rate of 70 ml/hr, with minimal residuals. Note failed extubation attempt yesterday; will attempt to wean again today. Malnutrition Assessment:  Malnutrition Status:  Insufficient data    Context:  Acute Illness     Findings of the 6 clinical characteristics of malnutrition:  Energy Intake:  Unable to assess  Weight Loss:  Unable to assess     Body Fat Loss:  No significant body fat loss     Muscle Mass Loss:  No significant muscle mass loss    Fluid Accumulation:  7 - Moderate to Severe- Extremities, Generalized   Strength:  Not Performed    Estimated Daily Nutrient Needs:  Energy (kcal):  2763-0655 kcal/d (12-13 kcal/kg CBW); Weight Used for Energy Requirements:  Current(264 kg)     Protein (g):  146-183 g protein/d (2.0-2.5 g/kg IBW); Weight Used for Protein Requirements:  Ideal(73 kg)          Nutrition Related Findings:  Labs reviewed include: Na 148 (H). Meds reviewed. Hypoactive bowel sounds noted. +3 non-pitting generalized/BUE/BLE edema. Date of last BM unknown.       Wounds:  Cellulitis       Current Nutrition Therapies:    Current Tube Feeding (TF) Orders:  · Feeding Route: Nasogastric  · Formula: Immune Enhancing(Pivot 1.5 Yemi)  · Schedule: Continuous  · Current TF & Flush Orders Provides: See below  · Goal TF & Flush Orders Provides: @ 70 ml/hr, continuous = 2520 kcal, 158 g protein    Additional Calorie Sources:   Propofol @ 23.8 ml/hr  = 628 kcal/d    Anthropometric Measures:  · Height: 5' 9\" (175.3 cm)  · Current Body Weight: 582 lb 0.2 oz (264 kg)   · Admission Body Weight: 264 lb (119.7 kg)(Stated per EMS)    · Usual Body Weight: Unknown     · Ideal Body Weight: 160 lbs; % Ideal Body Weight 363.8%  · BMI: 85.9  · Adjusted Body Weight:  No Adjustment   · BMI Categories: Obese Class 3 (BMI 40.0 or greater)       Nutrition Diagnosis:   · Inadequate protein intake related to impaired respiratory function as evidenced by NPO or clear liquid status due to medical condition, intubation, nutrition support - enteral nutrition    Nutrition Interventions:   Food and/or Nutrient Delivery:  Continue NPO, Continue Current Tube Feeding  Nutrition Education/Counseling:  Education not indicated, No recommendation at this time   Coordination of Nutrition Care:  Continue to monitor while inpatient    Goals:  EN intake to meet % of estimated nutrition needs       Nutrition Monitoring and Evaluation:   Behavioral-Environmental Outcomes:  None Identified   Food/Nutrient Intake Outcomes:  Enteral Nutrition Intake/Tolerance  Physical Signs/Symptoms Outcomes:  Biochemical Data, Constipation, Fluid Status or Edema, Nutrition Focused Physical Findings, Skin, Weight     Discharge Planning:     Too soon to determine     Electronically signed by Gautam Mccall RD, LD on 12/4/20 at 11:46 AM EST    Contact: [phone]

## 2020-12-04 NOTE — PROGRESS NOTES
INTENSIVE CARE UNIT  Resident Physician Progress Note    Zhang - Mónica Gregory  Date of Admission -  2020  4:33 AM  Date of Evaluation -  2020  Room and Bed Number -  0105/0105-01   Hospital Day - 2      SUBJECTIVE:     OVERNIGHT EVENTS:    No acute issues overnight. Patient resting in bed. On propofol, however following commands. remains intubated, mechanical ventilation 20/530/8/40. Failed extubation attempt yesterday. Will attempt to wean again today. Left lower extremity cellulitis appears to be improving. Continue vancomycin. Creatinine continues to creep up.       TODAY:      AWAKE & FOLLOWING COMMANDS:  [] No   [x] Yes    SECRETIONS Amount:  [] Small [] Moderate  [] Large  [x] None  Color:     [] White [] Colored  [] Bloody    SEDATION:  RAAS Score:  [x] Propofol gtt  [] Versed gtt  [] Ativan gtt   [] No Sedation    PARALYZED:  [x] No    [] Yes    VASOPRESSORS:  [x] No    [] Yes  [] Levophed [] Dopamine [] Vasopressin  [] Dobutamine [] Phenylephrine [] Epinephrine      OBJECTIVE:     VITAL SIGNS:  BP (!) 103/49   Pulse 104   Temp 99.6 °F (37.6 °C) (Axillary)   Resp 24   Ht 5' 9\" (1.753 m)   Wt (!) 582 lb 0.2 oz (264 kg)   SpO2 95%   BMI 85.95 kg/m²   Tmax over 24 hours:  Temp (24hrs), Av.1 °F (37.3 °C), Min:98.7 °F (37.1 °C), Max:99.9 °F (37.7 °C)      Patient Vitals for the past 8 hrs:   BP Temp Temp src Pulse Resp SpO2   20 0614 -- -- -- -- 24 95 %   20 0600 (!) 103/49 -- -- 104 26 --   20 0500 108/60 -- -- 103 28 --   20 0400 118/62 -- -- 108 30 94 %   20 0356 -- -- -- 106 26 95 %   20 0300 122/62 -- -- 108 30 96 %   20 0200 (!) 115/57 -- -- 106 27 96 %   20 0100 (!) 115/59 -- -- 108 26 --   20 0050 -- -- -- 107 (!) 36 98 %   20 0000 134/66 99.6 °F (37.6 °C) Axillary 113 (!) 34 93 %         Intake/Output Summary (Last 24 hours) at 2020 0722  Last data filed at 2020 0643  Gross per 24 hour   Intake 3506.1 ml   Output 1960 ml   Net 1546.1 ml     Date 12/04/20 0000 - 12/04/20 2359   Shift 2440-3792 5079-6993 9287-6609 24 Hour Total   INTAKE   I.V.(mL/kg) 1063.4(4)   1063.4(4)   NG/GT(mL/kg) 573(2.2)   573(2.2)   Shift Total(mL/kg) 1636.4(6.2)   1636.4(6.2)   OUTPUT   Urine(mL/kg/hr) 775   775   Shift Total(mL/kg) 775(2.9)   775(2.9)   Weight (kg) 264 264 264 264     Wt Readings from Last 3 Encounters:   12/02/20 (!) 582 lb 0.2 oz (264 kg)     Body mass index is 85.95 kg/m². PHYSICAL EXAM:  Constitutional: Intubated, sedated. Following commands. Morbidly obese (BMI 86)  EENT: PERRLA, EOMI, sclera clear, anicteric, oropharynx clear  Neck: Large neck, supple, symmetrical, trachea midline, no adenopathy, thyroid symmetric  Respiratory: Intubated, equal air entry bilaterally, no rales, rhonchi  Cardiovascular: regular rate and rhythm, normal S1, S2, no murmur noted, pulses identified with Doppler   Abdomen: Cellulitis improving  Neurological: Sedated  Extremities: Left lower extremity swelling which is improving.     MEDICATIONS:  Scheduled Meds:   vancomycin  1,500 mg Intravenous Once    lansoprazole  30 mg Per NG tube QAM AC    sodium chloride flush  10 mL Intravenous 2 times per day    enoxaparin  40 mg Subcutaneous BID    vancomycin (VANCOCIN) intermittent dosing (placeholder)   Other RX Placeholder     Continuous Infusions:   sodium chloride 100 mL/hr at 12/04/20 0511    propofol 15 mcg/kg/min (12/04/20 0308)     PRN Meds:   sodium chloride flush, 10 mL, PRN  acetaminophen, 650 mg, Q6H PRN    Or  acetaminophen, 650 mg, Q6H PRN  polyethylene glycol, 17 g, Daily PRN  promethazine, 12.5 mg, Q6H PRN    Or  ondansetron, 4 mg, Q6H PRN        SUPPORT DEVICES: [x] Ventilator [] BIPAP  [] Nasal Cannula [] Room Air    VENT SETTINGS (Comprehensive) (if applicable):    Vent Information  $Ventilation: $Subsequent Day  Skin Assessment: Clean, dry, & intact  Equipment Changed: HME(insp, excp filter)  Vent Type: Servo i  Vent Mode: PRVC  Vt Ordered: 530 mL  Rate Set: 20 bmp  FiO2 : 40 %  SpO2: 95 %  SpO2/FiO2 ratio: 237.5  Sensitivity: 5  PEEP/CPAP: 5  I Time/ I Time %: 0.9 s  Humidification Source: HME  Additional Respiratory  Assessments  Pulse: 104  Resp: 24  SpO2: 95 %  $End Tidal CO2: 42  Position: Semi-Moncada's  Humidification Source: HME  Oral Care Completed?: Yes  Oral Care: Mouthwash, Mouth swabbed, Mouth moisturizer, Mouth suctioned  Subglottic Suction Done?: No    ABGs:   Lab Results   Component Value Date    UDL3HVP 34 12/02/2020    FIO2 40.0 12/04/2020         DATA:  Complete Blood Count:   Recent Labs     12/02/20  0537 12/03/20  0703 12/04/20  0459   WBC 11.5* 10.8 13.1*   RBC 3.98* 4.14* 3.84*   HGB 11.4* 11.7* 11.0*   HCT 39.0* 39.1* 37.9*   MCV 98.0 94.4 98.7   MCH 28.6 28.3 28.6   MCHC 29.2 29.9 29.0   RDW 15.1* 15.6* 15.9*    374 329   MPV 10.7 10.3 10.5        Last 3 Blood Glucose:   Recent Labs     12/02/20  0537 12/03/20  0703 12/04/20  0459   GLUCOSE 97 143* 137*        PT/INR:  No results found for: PROTIME, INR  PTT:  No results found for: APTT, PTT    Comprehensive Metabolic Profile:   Recent Labs     12/02/20  0537 12/03/20  0703 12/04/20  0459    144 148*   K 3.9 3.9 3.9   CL 99 101 105   CO2 30 32* 32*   BUN 30* 39* 41*   CREATININE 1.41* 1.51* 1.55*   GLUCOSE 97 143* 137*   CALCIUM 8.7 8.7 8.4*   PROT 6.7 7.1 7.1   LABALBU 2.3* 2.4* 2.3*   BILITOT 0.46 0.39 0.37   ALKPHOS 129 117 99   AST 43* 33 37   ALT 41 31 28      Magnesium: No results found for: MG  Phosphorus: No results found for: PHOS  Ionized Calcium: No results found for: CAION     Urinalysis:   Lab Results   Component Value Date    NITRU NEGATIVE 12/02/2020    COLORU DARK YELLOW 12/02/2020    PHUR 5.0 12/02/2020    WBCUA 5 TO 10 12/02/2020    RBCUA 0 TO 2 12/02/2020    MUCUS NOT REPORTED 12/02/2020    TRICHOMONAS NOT REPORTED 12/02/2020    YEAST NOT REPORTED 12/02/2020    BACTERIA NOT REPORTED 12/02/2020    SPECGRAV 1.021 12/02/2020    LEUKOCYTESUR SMALL 12/02/2020    UROBILINOGEN Normal 12/02/2020    BILIRUBINUR NEGATIVE 12/02/2020    GLUCOSEU NEGATIVE 12/02/2020    KETUA TRACE 12/02/2020    AMORPHOUS NOT REPORTED 12/02/2020       HgBA1c:  No results found for: LABA1C  TSH:  No results found for: TSH  Lactic Acid:   Lab Results   Component Value Date    LACTA NOT REPORTED 12/04/2020    LACTA NOT REPORTED 12/03/2020    LACTA NOT REPORTED 12/02/2020      Troponin: No results for input(s): TROPONINI in the last 72 hours. ASSESSMENT:     Patient Active Problem List    Diagnosis Date Noted    Cellulitis of left leg 12/02/2020    ERA (obstructive sleep apnea) 12/02/2020    Morbid obesity ([facility] [address] 12/02/2020    Acute hypercapnic respiratory failure ([facility] [address] 12/01/2020          PLAN:     WEAN PER PROTOCOL:  [] No   [x] Yes  [] N/A    ICU PROPHYLAXIS:  Stress ulcer:  [x] PPI Agent  [] [id number] [] Sucralfate  [] Other:  VTE:   [x] Enoxaparin  [] Unfract. Heparin Subcut  [] EPC Cuffs    NUTRITION:  [] NPO [x] Tube Feeding      [] TPN  [] PO    HOME MEDS RECONCILED: [x] No  [] Yes    CONSULTATION NEEDED:  [x] No  [] Yes    FAMILY UPDATED:    [] No  [] Yes    TRANSFER OUT OF ICU:   [x] No  [] Yes      Neuro:  · Sedated with propofol, following commands     Resp:  · Acute hypoxic hypercapnic respiratory failure  · Intubated, mechanical ventilation - vent settings 20/538/8/40 COVID exposure, negative rapid test at [facility]. Repeat COVID test, respiratory panel both negative. · Unable to get CT chest due to body habitus. · Unable to tolerate weaning yesterday. Will attempt again today.     CV:  · Regular rate, rhythm. Pulses present. BP stable  · Troponinemia, likely demand ischemia. Will trend, consider cardiology consult. · Echo at [facility] - EF 55-60%, difficult study to assess     ID:  · LLE cellulitis improving.   Continue vancomycin  · Cultures remain negative     Heme:  · Leukocytosis secondary to acute cellulitis  · CBC daily     GI/Nutrition:  · NPO    Renal:  · ELIECER slightly worse 1.55 creatinine. FeNa 0.2  · Renal ultrasound yesterday unremarkable  · Will have to consult nephrology if creatinine fails to normalize     Prophylaxis:  · DVT: Lovenox 40 mg subQ daily  · GI: protonix 40 mg daily     Dispo:  · Admit to ICU      Raisa Lara MD  Internal Medicine Resident, PGY-2  [facility];  [place], [place]  12/4/2020, 8:08 AM

## 2020-12-04 NOTE — PROGRESS NOTES
Pharmacy Vancomycin Consult     Vancomycin Day: 3  Current Dosing: by levels    Temp max:  37.7C    Recent Labs     12/03/20  0703 12/04/20  0459   BUN 39* 41*       Recent Labs     12/03/20  0703 12/04/20  0459   CREATININE 1.51* 1.55*       Recent Labs     12/03/20  0703 12/04/20  0459   WBC 10.8 13.1*         Intake/Output Summary (Last 24 hours) at 12/4/2020 0712  Last data filed at 12/4/2020 8318  Gross per 24 hour   Intake 3506.1 ml   Output 1960 ml   Net 1546.1 ml       Culture Date      Source                       Results  12.02                  Resp Panel                 Not detected  12.02                  MRSA Swab               Negative  12.02                  Blood x1                      No growth    Ht Readings from Last 1 Encounters:   12/02/20 5' 9\" (1.753 m)        Wt Readings from Last 1 Encounters:   12/02/20 (!) 582 lb 0.2 oz (264 kg)         Body mass index is 85.95 kg/m². Estimated Creatinine Clearance: 142 mL/min (A) (based on SCr of 1.55 mg/dL (H)). Random: 12.4 mcg/mL    Assessment/Plan:  Patient's random level this morning with an acceptable range for the treatment of cellulitis. Last dose given prior to arrival at our facility. Will give vancomycin 1500mg x1 today and monitor closely based on kidney function.     Mati Burr, PharmD BCPS [facility]  12/4/2020 7:12 AM

## 2020-12-04 NOTE — PLAN OF CARE
Problem: Confusion - Acute:  Goal: Absence of continued neurological deterioration signs and symptoms  Description: Absence of continued neurological deterioration signs and symptoms  Outcome: Ongoing  Goal: Mental status will be restored to baseline  Description: Mental status will be restored to baseline  Outcome: Ongoing

## 2020-12-05 NOTE — FLOWSHEET NOTE
Patients mother at bedside and asking about belongings. RN went through belongings with patients mother patient has shirt, shorts, underwear, footwear (sandals), personal hygiene products, cell phone, cell phone  and wallet. All belongings sent home with patients mother, she requested to leave the Listerine mouthwash at bedside for patient if he were to be extubated.      Electronically signed by Zully Alba RN on 12/5/2020 at 11:28 AM

## 2020-12-05 NOTE — PROGRESS NOTES
The tube was secured with an endotracheal tube velásquez. The endotracheal tube was moved and the skin assessed. Skin assessment revealed no problems. Endotracheal tube was taped at the  26 cm robert. Oral gastric tube was not retaped to the endotracheal tube. Endotracheal tube velásquez was applied on December 5.  Action steps for any skin breakdown: none needed    MILAGROS MEDINA  2:26 PM

## 2020-12-05 NOTE — PROGRESS NOTES
INTENSIVE CARE UNIT  Resident Physician Progress Note    Patient - Cammie Walker  Date of Admission -  2020  4:33 AM  Date of Evaluation -  2020  Room and Bed Number -  0105/0105-01   Hospital Day - 3      SUBJECTIVE:     OVERNIGHT EVENTS:      No acute events overnight. Resting in bed, afebrile, hemodynamically stable, remains intubated, mechanical ventilation 20/530/10/70. Patient did not tolerate spontaneous breathing mode yesterday. Sedated with propofol blood is awake, able to follow commands. ELIECER downtrending this morning, creatinine 1.37. IVF discontinued yesterday. Cellulitis continues to improve, remains on vancomycin.     TODAY:      AWAKE & FOLLOWING COMMANDS:  [] No   [x] Yes    SECRETIONS Amount:  [] Small [] Moderate  [] Large  [x] None  Color:     [] White [] Colored  [] Bloody    SEDATION:  RAAS Score:  [x] Propofol gtt  [] Versed gtt  [] Ativan gtt   [] No Sedation    PARALYZED:  [x] No    [] Yes    VASOPRESSORS:  [x] No    [] Yes  [] Levophed [] Dopamine [] Vasopressin  [] Dobutamine [] Phenylephrine [] Epinephrine      OBJECTIVE:     VITAL SIGNS:  /62   Pulse 91   Temp 99.5 °F (37.5 °C) (Oral)   Resp 25   Ht 5' 9\" (1.753 m)   Wt (!) 582 lb 0.2 oz (264 kg)   SpO2 94%   BMI 85.95 kg/m²   Tmax over 24 hours:  Temp (24hrs), Av.8 °F (37.7 °C), Min:98.9 °F (37.2 °C), Max:101.2 °F (38.4 °C)      Patient Vitals for the past 8 hrs:   BP Temp Temp src Pulse Resp SpO2   20 0818 -- -- -- -- 25 --   20 0800 124/62 99.5 °F (37.5 °C) Oral 91 (!) 32 94 %   20 0700 (!) 110/58 -- -- 93 23 --   20 0600 (!) 120/58 -- -- 83 20 --   20 0500 123/61 -- -- 88 24 --   20 0406 -- -- -- 88 25 93 %   20 040 127/65 -- -- 90 23 --   20 0300 (!) 119/56 -- -- 91 23 --   20 0200 (!) 122/59 -- -- 90 21 --   20 0100 116/62 -- -- 84 20 --         Intake/Output Summary (Last 24 hours) at 2020 0841  Last data filed at 2020 3394  Gross per 24 hour   Intake 2779.1 ml   Output 2325 ml   Net 454.1 ml     Date 12/05/20 0000 - 12/05/20 2359   Shift 6109-2375 0123-7273 1581-8264 24 Hour Total   INTAKE   I.V.(mL/kg) 91.8(0.3) 130(0.5)  221.8(0.8)   NG/GT(mL/kg) 556(2.1) 286(1.1)  842(3.2)   Shift Total(mL/kg) 647.8(2.5) 416(1.6)  1063. 8(4)   OUTPUT   Urine(mL/kg/hr) 775(0.4) 150  925   Shift Total(mL/kg) 775(2.9) 150(0.6)  925(3.5)   Weight (kg) 264 264 264 264     Wt Readings from Last 3 Encounters:   12/02/20 (!) 582 lb 0.2 oz (264 kg)     Body mass index is 85.95 kg/m². PHYSICAL EXAM:  Constitutional: Intubated, sedated. Following commands. Morbidly obese (BMI 86)  EENT: PERRLA, EOMI, sclera clear, anicteric, oropharynx clear  Neck: Large neck, supple, symmetrical, trachea midline, no adenopathy, thyroid symmetric  Respiratory: Intubated, equal air entry bilaterally, no rales, rhonchi  Cardiovascular: regular rate and rhythm, normal S1, S2, no murmur noted, pulses identified with Doppler   Abdomen: Cellulitis improving  Neurological: Sedated  Extremities: Left lower extremity swelling which is improving.     MEDICATIONS:  Scheduled Meds:   lansoprazole  30 mg Per NG tube QAM AC    sodium chloride flush  10 mL Intravenous 2 times per day    enoxaparin  40 mg Subcutaneous BID    vancomycin (VANCOCIN) intermittent dosing (placeholder)   Other RX Placeholder     Continuous Infusions:   propofol 15 mcg/kg/min (12/05/20 0826)     PRN Meds:   sodium chloride flush, 10 mL, PRN  acetaminophen, 650 mg, Q6H PRN    Or  acetaminophen, 650 mg, Q6H PRN  polyethylene glycol, 17 g, Daily PRN  promethazine, 12.5 mg, Q6H PRN    Or  ondansetron, 4 mg, Q6H PRN        SUPPORT DEVICES: [x] Ventilator [] BIPAP  [] Nasal Cannula [] Room Air    VENT SETTINGS (Comprehensive) (if applicable):    Vent Information  $Ventilation: $Subsequent Day  Skin Assessment: Clean, dry, & intact  Equipment Changed: Expiratory Filter  Vent Type: Servo i  Vent Mode: PRVC  Vt Ordered: 530 mL  Rate Set: 20 bmp  Pressure Support: 10 cmH20  FiO2 : 70 %  SpO2: 94 %  SpO2/FiO2 ratio: 134.29  Sensitivity: 5  PEEP/CPAP: 10  I Time/ I Time %: 0.8 s  Humidification Source: Heated wire  Humidification Temp: 37  Humidification Temp Measured: 36.9  Circuit Condensation: Drained  Additional Respiratory  Assessments  Pulse: 91  Resp: 25  SpO2: 94 %  $End Tidal CO2: 38  Position: Semi-Moncada's  Humidification Source: Heated wire  Humidification Temp: 37  Circuit Condensation: Drained  Oral Care Completed?: Yes  Oral Care: Teeth brushed, Lip moisturizer applied, Mouth moisturizer, Mouth suctioned, Mouth swabbed  Subglottic Suction Done?: Yes  Skin barrier applied: Yes    ABGs:   Lab Results   Component Value Date    DMJ1HMG 34 12/02/2020    FIO2 40.0 12/04/2020         DATA:  Complete Blood Count:   Recent Labs     12/03/20  0703 12/04/20 0459 12/05/20 0327   WBC 10.8 13.1* 12.6*   RBC 4.14* 3.84* 3.61*   HGB 11.7* 11.0* 10.4*   HCT 39.1* 37.9* 35.9*   MCV 94.4 98.7 99.4   MCH 28.3 28.6 28.8   MCHC 29.9 29.0 29.0   RDW 15.6* 15.9* 15.9*    329 274   MPV 10.3 10.5 10.5        Last 3 Blood Glucose:   Recent Labs     12/03/20  0703 12/04/20  0459 12/05/20  0327   GLUCOSE 143* 137* 151*        PT/INR:  No results found for: PROTIME, INR  PTT:  No results found for: APTT, PTT    Comprehensive Metabolic Profile:   Recent Labs     12/03/20  0703 12/04/20 0459 12/05/20  0327    148* 147*   K 3.9 3.9 4.5    105 108*   CO2 32* 32* 32*   BUN 39* 41* 42*   CREATININE 1.51* 1.55* 1.37*   GLUCOSE 143* 137* 151*   CALCIUM 8.7 8.4* 8.1*   PROT 7.1 7.1 7.1   LABALBU 2.4* 2.3* 2.1*   BILITOT 0.39 0.37 0.34   ALKPHOS 117 99 89   AST 33 37 37   ALT 31 28 30      Magnesium: No results found for: MG  Phosphorus: No results found for: PHOS  Ionized Calcium: No results found for: CAION     Urinalysis:   Lab Results   Component Value Date    NITRU NEGATIVE 12/02/2020    COLORU DARK YELLOW 12/02/2020    PHUR 5.0 12/02/2020    WBCUA 5 TO 10 12/02/2020    RBCUA 0 TO 2 12/02/2020    MUCUS NOT REPORTED 12/02/2020    TRICHOMONAS NOT REPORTED 12/02/2020    YEAST NOT REPORTED 12/02/2020    BACTERIA NOT REPORTED 12/02/2020    SPECGRAV 1.021 12/02/2020    LEUKOCYTESUR SMALL 12/02/2020    UROBILINOGEN Normal 12/02/2020    BILIRUBINUR NEGATIVE 12/02/2020    GLUCOSEU NEGATIVE 12/02/2020    KETUA TRACE 12/02/2020    AMORPHOUS NOT REPORTED 12/02/2020       HgBA1c:  No results found for: LABA1C  TSH:  No results found for: TSH  Lactic Acid:   Lab Results   Component Value Date    LACTA NOT REPORTED 12/05/2020    LACTA NOT REPORTED 12/04/2020    LACTA NOT REPORTED 12/03/2020      Troponin: No results for input(s): TROPONINI in the last 72 hours. ASSESSMENT:     Patient Active Problem List    Diagnosis Date Noted    Cellulitis of left leg 12/02/2020    ERA (obstructive sleep apnea) 12/02/2020    Morbid obesity ([facility] [address] 12/02/2020    Acute hypercapnic respiratory failure ([facility] [address] 12/01/2020          PLAN:     WEAN PER PROTOCOL:  [] No   [x] Yes  [] N/A    ICU PROPHYLAXIS:  Stress ulcer:  [x] PPI Agent  [] [id number] [] Sucralfate  [] Other:  VTE:   [x] Enoxaparin  [] Unfract. Heparin Subcut  [] EPC Cuffs    NUTRITION:  [] NPO [x] Tube Feeding      [] TPN  [] PO    HOME MEDS RECONCILED: [x] No  [] Yes    CONSULTATION NEEDED:  [x] No  [] Yes    FAMILY UPDATED:    [] No  [] Yes    TRANSFER OUT OF ICU:   [x] No  [] Yes      Neuro:  · Sedated with propofol, following commands     Resp:  · Acute hypoxic hypercapnic respiratory failure  · Intubated, mechanical ventilation - vent settings 20/530/10/70 COVID exposure, negative rapid test at [facility]. Repeat COVID test, respiratory panel both negative. · Unable to get CT chest due to body habitus. · Failed spontaneous mode breathing yesterday. Attempt again today.     CV:  · Regular rate, rhythm. Pulses present. BP stable  · Troponinemia, likely demand ischemia.   Will trend, consider cardiology consult. · Echo at [facility] - EF 55-60%, difficult study to assess     ID:  · LLE cellulitis improving. Continue vancomycin  · Cultures remain negative     Heme:  · Leukocytosis secondary to acute cellulitis  · CBC daily     GI/Nutrition:  · Continue tube feeds at 70 mL/hour    Renal:  · Prerenal ELIECER improving today, creatinine 1.37  · Renal ultrasound yesterday unremarkable     Prophylaxis:  · DVT: Lovenox 40 mg subQ daily  · GI: protonix 40 mg daily     Dispo:  · Admit to ICU      Shanita Ruiz MD  Internal Medicine Resident, PGY-2  [address], [place]  12/5/2020, 8:41 AM

## 2020-12-05 NOTE — PLAN OF CARE
Problem: Restraint Use - Nonviolent/Non-Self-Destructive Behavior:  Goal: Absence of restraint-related injury  Description: Absence of restraint-related injury  Outcome: Met This Shift     Problem: Restraint Use - Nonviolent/Non-Self-Destructive Behavior:  Goal: Absence of restraint indications  Description: Absence of restraint indications  Outcome: Not Met This Shift     Problem: Skin Integrity:  Goal: Will show no infection signs and symptoms  Description: Will show no infection signs and symptoms  Outcome: Ongoing  Goal: Absence of new skin breakdown  Description: Absence of new skin breakdown  Outcome: Ongoing     Problem: Nutrition  Goal: Optimal nutrition therapy  Description: Nutrition Problem #1: Inadequate oral intake  Intervention: Food and/or Nutrient Delivery: Continue NPO, Start Tube Feeding  Nutritional Goals: EN intake to meet % of estimated nutrition needs     Outcome: Ongoing     Problem: OXYGENATION/RESPIRATORY FUNCTION  Goal: Patient will maintain patent airway  Outcome: Ongoing  Goal: Patient will achieve/maintain normal respiratory rate/effort  Description: Respiratory rate and effort will be within normal limits for the patient  Outcome: Ongoing     Problem: MECHANICAL VENTILATION  Goal: Patient will maintain patent airway  Outcome: Ongoing  Goal: Oral health is maintained or improved  Outcome: Ongoing  Goal: ET tube will be managed safely  Outcome: Ongoing  Goal: Ability to express needs and understand communication  Outcome: Ongoing  Goal: Mobility/activity is maintained at optimum level for patient  Outcome: Ongoing     Problem: Confusion - Acute:  Goal: Absence of continued neurological deterioration signs and symptoms  Description: Absence of continued neurological deterioration signs and symptoms  Outcome: Ongoing  Goal: Mental status will be restored to baseline  Description: Mental status will be restored to baseline  Outcome: Ongoing     Problem: Discharge Planning:  Goal: thinking  Description: Able to interrupt nonreality-based thinking  Outcome: Ongoing     Problem: Sleep Pattern Disturbance:  Goal: Appears well-rested  Description: Appears well-rested  Outcome: Ongoing

## 2020-12-06 NOTE — PROGRESS NOTES
data filed at 12/6/2020 1120  Gross per 24 hour   Intake 4624.6 ml   Output 1350 ml   Net 3274.6 ml     Date 12/06/20 0000 - 12/06/20 2359   Shift 3425-0588 1565-8631 2946-1393 24 Hour Total   INTAKE   I.V.(mL/kg) 253.6(1) 623(2.4)  876.6(3.3)   NG/GT(mL/kg) 810(3.1) 1633(6.2)  2443(9.3)   Shift Total(mL/kg) 1063. 6(4) 0078(2.5)  3315.6(02.2)   OUTPUT   Urine(mL/kg/hr) 500(0.2) 225  725   Shift Total(mL/kg) 500(1.9) 225(0.9)  725(2.7)   Weight (kg) 264 264 264 264     Wt Readings from Last 3 Encounters:   12/02/20 (!) 582 lb 0.2 oz (264 kg)     Body mass index is 85.95 kg/m². PHYSICAL EXAM:  Constitutional: Intubated, sedated. Following commands. Morbidly obese (BMI 86)  EENT: PERRLA, EOMI, sclera clear, anicteric, oropharynx clear  Neck: Large neck, supple, symmetrical, trachea midline, no adenopathy, thyroid symmetric  Respiratory: Intubated, equal air entry bilaterally, no rales, rhonchi  Cardiovascular: regular rate and rhythm, normal S1, S2, no murmur noted, pulses identified with Doppler   Abdomen: Cellulitis improving  Neurological: Sedated  Extremities: Left lower extremity swelling which is improving.     MEDICATIONS:  Scheduled Meds:   vancomycin  1,000 mg Intravenous Q8H    piperacillin-tazobactam  3.375 g Intravenous Q8H    lidocaine        lidocaine PF  5 mL Nebulization Once    lansoprazole  30 mg Per NG tube QAM AC    sodium chloride flush  10 mL Intravenous 2 times per day    enoxaparin  40 mg Subcutaneous BID    vancomycin (VANCOCIN) intermittent dosing (placeholder)   Other RX Placeholder     Continuous Infusions:   propofol 20 mcg/kg/min (12/06/20 1153)     PRN Meds:   sodium chloride flush, 10 mL, PRN  acetaminophen, 650 mg, Q6H PRN    Or  acetaminophen, 650 mg, Q6H PRN  polyethylene glycol, 17 g, Daily PRN  promethazine, 12.5 mg, Q6H PRN    Or  ondansetron, 4 mg, Q6H PRN        SUPPORT DEVICES: [x] Ventilator [] BIPAP  [] Nasal Cannula [] Room Air    VENT SETTINGS (Comprehensive) (if applicable):    Vent Information  $Ventilation: $Subsequent Day  Skin Assessment: Clean, dry, & intact  Equipment Changed:  Other (comment)(etco2 line)  Vent Type: Servo i  Vent Mode: PRVC  Vt Ordered: 530 mL  Rate Set: 20 bmp  Pressure Support: 10 cmH20  FiO2 : 100 %  SpO2: 91 %  SpO2/FiO2 ratio: (P) 91  Sensitivity: 5  PEEP/CPAP: 14  I Time/ I Time %: 0.8 s  Humidification Source: Heated wire  Humidification Temp: 37  Humidification Temp Measured: 36.9  Circuit Condensation: Drained  Nitric Oxide/Epoprostenol In Use?: No  Additional Respiratory  Assessments  Pulse: 93  Resp: 25  SpO2: 91 %  $End Tidal CO2: 38  Position: Semi-Moncada's  Humidification Source: Heated wire  Humidification Temp: 37  Circuit Condensation: Drained  Oral Care Completed?: Yes  Oral Care: Mouthwash, Mouth suctioned, Mouth swabbed, Teeth brushed  Subglottic Suction Done?: Yes  Skin barrier applied: Yes    ABGs:   Lab Results   Component Value Date    SJG4BOR 34 12/02/2020    FIO2 70.0 12/06/2020         DATA:  Complete Blood Count:   Recent Labs     12/04/20 0459 12/05/20 0327 12/06/20 0622   WBC 13.1* 12.6* 11.5*   RBC 3.84* 3.61* 3.81*   HGB 11.0* 10.4* 11.0*   HCT 37.9* 35.9* 39.3*   MCV 98.7 99.4 103.1*   MCH 28.6 28.8 28.9   MCHC 29.0 29.0 28.0*   RDW 15.9* 15.9* 15.9*    274 255   MPV 10.5 10.5 10.9        Last 3 Blood Glucose:   Recent Labs     12/04/20 0459 12/05/20 0327 12/06/20 0622   GLUCOSE 137* 151* 145*        PT/INR:  No results found for: PROTIME, INR  PTT:  No results found for: APTT, PTT    Comprehensive Metabolic Profile:   Recent Labs     12/04/20 0459 12/05/20 0327 12/06/20 0622   * 147* 148*   K 3.9 4.5 4.5    108* 109*   CO2 32* 32* 29   BUN 41* 42* 43*   CREATININE 1.55* 1.37* 1.32*   GLUCOSE 137* 151* 145*   CALCIUM 8.4* 8.1* 8.4*   PROT 7.1 7.1 7.9   LABALBU 2.3* 2.1* 1.9*   BILITOT 0.37 0.34 0.47   ALKPHOS 99 89 93   AST 37 37 41*   ALT 28 30 36      Magnesium: No results found for: MG  Phosphorus: No results found for: PHOS  Ionized Calcium: No results found for: CAION     Urinalysis:   Lab Results   Component Value Date    NITRU NEGATIVE 12/02/2020    COLORU DARK YELLOW 12/02/2020    PHUR 5.0 12/02/2020    WBCUA 5 TO 10 12/02/2020    RBCUA 0 TO 2 12/02/2020    MUCUS NOT REPORTED 12/02/2020    TRICHOMONAS NOT REPORTED 12/02/2020    YEAST NOT REPORTED 12/02/2020    BACTERIA NOT REPORTED 12/02/2020    SPECGRAV 1.021 12/02/2020    LEUKOCYTESUR SMALL 12/02/2020    UROBILINOGEN Normal 12/02/2020    BILIRUBINUR NEGATIVE 12/02/2020    GLUCOSEU NEGATIVE 12/02/2020    KETUA TRACE 12/02/2020    AMORPHOUS NOT REPORTED 12/02/2020       HgBA1c:  No results found for: LABA1C  TSH:  No results found for: TSH  Lactic Acid:   Lab Results   Component Value Date    LACTA NOT REPORTED 12/06/2020    LACTA NOT REPORTED 12/05/2020    LACTA NOT REPORTED 12/04/2020      Troponin: No results for input(s): TROPONINI in the last 72 hours. ASSESSMENT:     Patient Active Problem List    Diagnosis Date Noted    Cellulitis of left leg 12/02/2020    ERA (obstructive sleep apnea) 12/02/2020    Morbid obesity ([facility] [address] 12/02/2020    Acute hypercapnic respiratory failure ([facility] [address] 12/01/2020          PLAN:     WEAN PER PROTOCOL:  [] No   [x] Yes  [] N/A    ICU PROPHYLAXIS:  Stress ulcer:  [x] PPI Agent  [] [id number] [] Sucralfate  [] Other:  VTE:   [x] Enoxaparin  [] Unfract. Heparin Subcut  [] EPC Cuffs    NUTRITION:  [] NPO [x] Tube Feeding      [] TPN  [] PO    HOME MEDS RECONCILED: [x] No  [] Yes    CONSULTATION NEEDED:  [x] No  [] Yes    FAMILY UPDATED:    [] No  [] Yes    TRANSFER OUT OF ICU:   [x] No  [] Yes      Neuro:  · Sedated with propofol, following commands     Resp:  · Acute hypoxic hypercapnic respiratory failure: Currently worsening and requiring more of oxygen and pressure support. Covid negative. · Bronchoscopy today showing no mucous plugs. · Unable to get CT chest due to body habitus.   · Failed spontaneous mode breathing yesterday.      CV:  · Regular rate, rhythm. Pulses present. BP stable  · Troponinemia, likely demand ischemia. Will trend, consider cardiology consult. · Echo at [facility] - EF 55-60%.     ID:  · LLE cellulitis improving. Continue vancomycin  · Cultures remain negative     Heme:  · Leukocytosis secondary to acute cellulitis  · CBC daily     GI/Nutrition:  · Continue tube feeds at 70 mL/hour    Renal:  · Prerenal ELIECER improving today, creatinine 1.37  · Renal ultrasound yesterday unremarkable     Prophylaxis:  · DVT: Lovenox 40 mg subQ daily  · GI: protonix 40 mg daily     Dispo:  Currently staying in ICU.         Sheryl Toscano MD       Critical Care Resident PGY-3  12/6/2020, 1:19 PM

## 2020-12-06 NOTE — PROGRESS NOTES
Pharmacy Vancomycin Consult     Vancomycin Day: 5  Current Dosing: by levels        Recent Labs     12/05/20  0327 12/06/20 0622   BUN 42* 43*       Recent Labs     12/05/20  0327 12/06/20 0622   CREATININE 1.37* 1.32*       Recent Labs     12/05/20 0327 12/06/20 0622   WBC 12.6* 11.5*         Intake/Output Summary (Last 24 hours) at 12/6/2020 1044  Last data filed at 12/6/2020 0827  Gross per 24 hour   Intake 5119.6 ml   Output 1725 ml   Net 3394.6 ml       Ht Readings from Last 1 Encounters:   12/02/20 5' 9\" (1.753 m)        Wt Readings from Last 1 Encounters:   12/02/20 (!) 582 lb 0.2 oz (264 kg)         Body mass index is 85.95 kg/m². Estimated Creatinine Clearance: 167 mL/min (A) (based on SCr of 1.32 mg/dL (H)). Trough: 6.1 - ELIECER looks to be trending back downwards to where patient is clearing vancomycin appropriately again. Assessment/Plan:  Will initiate vancomycin 1000mg IV q8 hours.  Will check trough again prior to 4th dose of new regimen on 12/7 at [address] PharmD

## 2020-12-06 NOTE — PLAN OF CARE
deterioration signs and symptoms  Description: Absence of continued neurological deterioration signs and symptoms  Outcome: Ongoing  Goal: Mental status will be restored to baseline  Description: Mental status will be restored to baseline  Outcome: Ongoing     Problem: Discharge Planning:  Goal: Ability to perform activities of daily living will improve  Description: Ability to perform activities of daily living will improve  Outcome: Ongoing  Goal: Participates in care planning  Description: Participates in care planning  Outcome: Ongoing     Problem: Injury - Risk of, Physical Injury:  Goal: Absence of physical injury  Description: Absence of physical injury  Outcome: Ongoing  Goal: Will remain free from falls  Description: Will remain free from falls  Outcome: Ongoing     Problem: Mood - Altered:  Goal: Mood stable  Description: Mood stable  Outcome: Ongoing  Goal: Absence of abusive behavior  Description: Absence of abusive behavior  Outcome: Ongoing  Goal: Verbalizations of feeling emotionally comfortable while being cared for will increase  Description: Verbalizations of feeling emotionally comfortable while being cared for will increase  Outcome: Ongoing     Problem: Psychomotor Activity - Altered:  Goal: Absence of psychomotor disturbance signs and symptoms  Description: Absence of psychomotor disturbance signs and symptoms  Outcome: Ongoing     Problem: Sensory Perception - Impaired:  Goal: Demonstrations of improved sensory functioning will increase  Description: Demonstrations of improved sensory functioning will increase  Outcome: Ongoing  Goal: Decrease in sensory misperception frequency  Description: Decrease in sensory misperception frequency  Outcome: Ongoing  Goal: Able to refrain from responding to false sensory perceptions  Description: Able to refrain from responding to false sensory perceptions  Outcome: Ongoing  Goal: Demonstrates accurate environmental perceptions  Description: Demonstrates accurate environmental perceptions  Outcome: Ongoing  Goal: Able to distinguish between reality-based and nonreality-based thinking  Description: Able to distinguish between reality-based and nonreality-based thinking  Outcome: Ongoing  Goal: Able to interrupt nonreality-based thinking  Description: Able to interrupt nonreality-based thinking  Outcome: Ongoing     Problem: Sleep Pattern Disturbance:  Goal: Appears well-rested  Description: Appears well-rested  Outcome: Ongoing

## 2020-12-06 NOTE — PROGRESS NOTES
Next to no urine all day. Dr Black Client at bedside to evaluate. He adjusted the de leon and urine came out around the de leon. Dr. Black Client instructed the writer to flush the de leon. [place] flushed with 0.9ns and 1100ml of urine returned.  Primary notified and asked for urology consult

## 2020-12-06 NOTE — CONSULTS
Renal Consult Note    Patient :  Godfrey Gray; 37 y.o. MRN# [id number]  Location:  0105  Attending:  Gisel Alfred MD  Admit Date:  2020   Hospital Day: 4    Reason for Consult:     Asked by Dr Gisel Alfred MD to see for ELIECER/Elevated Creatinine. History Obtained From:     electronic medical record    History of Present Illness:     Godfrey Gray; 37 y.o. male with past medical history of morbid obesity, ERA, who presented to [facility] on 20 with complaint of pain and swelling of the left lower extremity. Cellulitis was demarcated on patient's left leg going up to mid thigh, however had apparently spread up into suprapubic region and across lower abdomen, and abdominal pannus. He started to develop respiratory issues with ABG showing respiratory acidosis. He was intubated and sent to [facility] on 20. He remains in ICU intubated and FIO2 is 100%. CXR yesterday with pulmonary congestion. He did have a bronch today which showed no evidence of mucous plugging. AB.38/56.6/60/33.7    Nephrology is consulted due to drop in urine output with no response from diuresis, and elevation in creatinine. On admission creatinine was 1.4-1.55-1.37, and today 1.32. He has not had contrast exposure. His weight is 582 pounds. His urine output yesterday was 1675 and only 325 thus far today. He was given Lasix 40mg this morning and then Bumex 2mg IV X1 this afternoon. Prior to that he was on IVF NS @ 100/hr which was discontinued 20. He has received Vancomycin  1500mg on 20, and 20, then started on 1000mg IV Q 8 hours today. Vanco trough was 6.1    UA showed trace ketones, and protein. No history of recent contrast exposure  No h/o prolonged NSAIDs use in the past,   No h/o nephrolithiasis, No recent skin rashes or arthralgias   No hematuria or pyuria noticed in the recent past.   Doesn't report any reduction in the urine output recently.    Non report of any obstructive urinary symptoms (urgency, frequency, weak stream, straining while urination). No h/o recurrent UTIs in the past.    Past History/Allergies? Social History:   No past medical history on file. No Known Allergies    Social History     Socioeconomic History    Marital status: Single     Spouse name: Not on file    Number of children: Not on file    Years of education: Not on file    Highest education level: Not on file   Occupational History    Not on file   Social Needs    Financial resource strain: Not on file    Food insecurity     Worry: Not on file     Inability: Not on file    Transportation needs     Medical: Not on file     Non-medical: Not on file   Tobacco Use    Smoking status: Not on file   Substance and Sexual Activity    Alcohol use: Not on file    Drug use: Not on file    Sexual activity: Not on file   Lifestyle    Physical activity     Days per week: Not on file     Minutes per session: Not on file    Stress: Not on file   Relationships    Social connections     Talks on phone: Not on file     Gets together: Not on file     Attends [employer] service: Not on file     Active member of club or organization: Not on file     Attends meetings of clubs or organizations: Not on file     Relationship status: Not on file    Intimate partner violence     Fear of current or ex partner: Not on file     Emotionally abused: Not on file     Physically abused: Not on file     Forced sexual activity: Not on file   Other Topics Concern    Not on file   Social History Narrative    Not on file       Family History:      No family history on file. Outpatient Medications:     No medications prior to admission.     Current Medications:     Scheduled Meds:    vancomycin  1,000 mg Intravenous Q8H    piperacillin-tazobactam  3.375 g Intravenous Q8H    lidocaine        lidocaine PF  5 mL Nebulization Once    lansoprazole  30 mg Per NG tube QAM AC    sodium chloride flush  10 mL Intravenous Phosphorus:   No results for input(s): PHOS in the last 72 hours. Magnesium:  No results for input(s): MG in the last 72 hours. Albumin:    Recent Labs     12/04/20  0459 12/05/20  0327 12/06/20  0622   LABALBU 2.3* 2.1* 1.9*     BNP:    No results found for: BNP  STEPHEN:    No results found for: STEPHEN  SPEP:  Lab Results   Component Value Date    PROT 7.9 12/06/2020     Hep BsAg:       No results found for: HEPBSAG  Hep C AB:        No results found for: HEPCAB    Urinalysis/Chemistries:      Lab Results   Component Value Date    NITRU NEGATIVE 12/02/2020    COLORU DARK YELLOW 12/02/2020    PHUR 5.0 12/02/2020    WBCUA 5 TO 10 12/02/2020    RBCUA 0 TO 2 12/02/2020    MUCUS NOT REPORTED 12/02/2020    TRICHOMONAS NOT REPORTED 12/02/2020    YEAST NOT REPORTED 12/02/2020    BACTERIA NOT REPORTED 12/02/2020    SPECGRAV 1.021 12/02/2020    LEUKOCYTESUR SMALL 12/02/2020    UROBILINOGEN Normal 12/02/2020    BILIRUBINUR NEGATIVE 12/02/2020    GLUCOSEU NEGATIVE 12/02/2020    KETUA TRACE 12/02/2020    AMORPHOUS NOT REPORTED 12/02/2020     Urine Sodium:     Lab Results   Component Value Date    MARCELINO 20 12/03/2020     Urine Potassium:  No results found for: KUR  Urine Chloride:  No results found for: CLUR  Urine Osmolarity:   Lab Results   Component Value Date    OSMOU 408 12/03/2020     Urine Protein:   No results found for: TPU  Urine Creatinine:     Lab Results   Component Value Date    LABCREA 126.7 12/03/2020     UPC:     Urine Eosinophils:  No components found for: UEOS    Radiology:     CXR  1. At most slight improvement in central predominant airspace opacities and    diffuse interstitial opacities most suggestive of edema, potentially    congestive heart failure given mild cardiomegaly.  Pneumonia could appear    similar. 2. At least small right pleural effusion with underlying right basilar    passive atelectasis.  Superimposed pneumonia and aspiration are not excluded. 3. Decreased left basilar atelectasis. Renal US  FINDINGS:    Patient morbidly obese and on mechanical ventilation, somewhat limiting the    technical quality of the examination.              Kidneys:         The right kidney measures 15.4 x 6.9 x 7.4 cm and the left kidney measures    11.9 x 7.7 x 7.9 cm.         Cortical thickness is 2.6 cm on the right and 1.9 cm on the left.         .         Kidneys demonstrate normal cortical echogenicity.  No evidence of    hydronephrosis or intrarenal stones.              Bladder:         There is a De Leon catheter in place within an empty urinary bladder.  No    obvious abnormality detected.              Impression    Limited study technically.  No hydronephrosis or clear ultrasound evidence    medical renal disease.  Empty urinary bladder with a De Leon catheter in place.             Assessment:     1. Acute Kidney Injury: Most likely prerenal renal functions are improving however nephrology was called because patient was not making urine  2. Cellulitis  3. Acute Hypoxia: Hypercapnia  4. Hypernatremia: Most likely due to excessive insensible losses due to large surface area further complicated by infusion of normal saline  5. Morbid obesity  6. Cellulitis patient is on Vanco  Plan:   1. Re adjusted de leon and a significant amount of urine flowed out around de leon  2. Pharmacy dosing Vanco. Watch renal function  3. Daily Labs  4. Avoid nephrotoxic drugs  5. ECHO has been ordered  6. Diuril 500mg IV X1     Will d/w Dr Wang Perfect    Nutrition   Please ensure that patient is on a renal diet/TF. Avoid nephrotoxic drugs/contrast exposure. Thank you for the consultation. Please do not hesitate to contact us for any further questions/concerns. We will continue to follow along with you.      Brian Hauser [facility]  Attending Physician Statement  I have discussed the care of Gatito Watt, including pertinent history and exam findings with the NP I have reviewed the key elements of all parts of the encounter with the np. Note was updated and recorded changes were made I have seen and examined the patient with the np. I agree with the assessment and plan and status of the problem list as documented. Patient was seen and examined. Chart reviewed. His chest x-ray shows signs suggestive of pulmonary congestion. His renal functions are improving and patient is also hyponatremic. Patient is morbidly obese. Johnson catheter was manipulated. Patient started pouring out urine from the site of the Johnson. I am not sure if Johnson was ruled or partially blocked.     Addiitionally I recommend flush Johnson and if anuria remains a problem involved urology for the Johnson catheter placement  Use Diuril 500 mg now and then every 12 hours as needed for diuresis because chlorothiazide-based diuretics are not going to worsen hyponatremia  Please call if you have any question  Selam Palmer

## 2020-12-07 PROBLEM — J96.01 ACUTE RESPIRATORY FAILURE WITH HYPOXIA AND HYPERCAPNIA (HCC): Status: ACTIVE | Noted: 2020-01-01

## 2020-12-07 NOTE — CARE COORDINATION
Urology Update: De Leon placed    Urology messaged to assist in changing catheter. Pt had 16 fr  De Leon catheter that was not draining yesterday. Nurses hand irrigated and de leon then drained 1900 mL output. De Leon was wanted changed because seal not broken by doctor/resident/PA. With nurse, Eileen, current de leon removed. Then, 16 de leon catheter with 10 cc balloon inserted and drainage bag connected. No difficulty other than due to pt morbid obesity. However was able to retract skin/panus for nurse so that urethral meatus was exposed. Otherwise normal de leon placement without further issues. Patient tolerated procedure well; no pain or difficulties with placement. Please monitor urine output, hand irrigate prn, call with questions. No formal consult given this was a simple de leon exchange.      Robert Ramirez PA-C  Urology Service   12/7/2020 12:01 PM

## 2020-12-07 NOTE — PLAN OF CARE
Problem: OXYGENATION/RESPIRATORY FUNCTION  Goal: Patient will maintain patent airway  12/7/2020 0808 by Andrés Melgoza RCP  Outcome: Ongoing     Problem: OXYGENATION/RESPIRATORY FUNCTION  Goal: Patient will achieve/maintain normal respiratory rate/effort  Description: Respiratory rate and effort will be within normal limits for the patient  12/7/2020 1481 by Andrés Melgoza RCP  Outcome: Ongoing     Problem: MECHANICAL VENTILATION  Goal: Patient will maintain patent airway  12/7/2020 0808 by Andrés Melgoza RCP  Outcome: Ongoing     Problem: MECHANICAL VENTILATION  Goal: Oral health is maintained or improved  12/7/2020 0808 by Andrés Melgoza RCP  Outcome: Ongoing     Problem: MECHANICAL VENTILATION  Goal: ET tube will be managed safely  12/7/2020 0808 by Andrés Melgoza RCP  Outcome: Ongoing     Problem: MECHANICAL VENTILATION  Goal: Ability to express needs and understand communication  12/7/2020 0808 by Andrés Melgoza RCP  Outcome: Ongoing     Problem: MECHANICAL VENTILATION  Goal: Mobility/activity is maintained at optimum level for patient  12/7/2020 1792 by Andrés Melgoza RCP  Outcome: Ongoing

## 2020-12-07 NOTE — PROGRESS NOTES
Renal Progress Note    Patient :  Patsy Payton; 37 y.o. MRN# [id number]  Location:  0105  Attending:  Emily Osman MD  Admit Date:  2020   Hospital Day: 5    Subjective:     Tube feeds continue at goal rate. Also on free water. Urine output better since Johnson was flushed. Creatinine up to 1.6. Hypernatremia persists. Insensible losses continue to be high. Also on vancomycin for treatment of left lower extremity cellulitis which extended up to the abdominal wall area. Is now spiking temperatures up to 38 degrees. FiO2 100%. Still not ventilating and oxygenating well. PCO2 53, PO2 58, saturation 89%. Johnson catheter in place. Unresponsive, sedated. Outpatient Medications:     No medications prior to admission. Current Medications:     Scheduled Meds:    piperacillin-tazobactam  3.375 g Intravenous Q8H    lidocaine PF  5 mL Nebulization Once    lansoprazole  30 mg Per NG tube QAM AC    sodium chloride flush  10 mL Intravenous 2 times per day    enoxaparin  40 mg Subcutaneous BID     Continuous Infusions:    propofol 20 mcg/kg/min (20 0810)     PRN Meds:  sodium chloride flush, acetaminophen **OR** acetaminophen, polyethylene glycol, promethazine **OR** ondansetron    Input/Output:       I/O last 3 completed shifts: In: 5996.9 [I.V.:1613.9; NG/GT:4083; IV Piggyback:300]  Out: 3365 [Urine:3365]    Vital Signs:   Temperature:  Temp: 101.1 °F (38.4 °C)  TMax:   Temp (24hrs), Av.9 °F (37.7 °C), Min:98.1 °F (36.7 °C), Max:101.8 °F (38.8 °C)    Respirations:  Resp: 30  Pulse:   Pulse: 109  BP:    BP: (!) 104/47  BP Range: Systolic (20TUR), QJW:650 , Min:101 , FAT:144       Diastolic (57MLC), YDE:14, Min:42, Max:85      Physical Examination:     General:  Sedated on ventilator. FiO2 100% with a PEEP of 10  HEENT:  Atraumatic, normocephalic, no throat congestion, moist mucosa. Eyes:   Pupils equal, round and reactive to light, pallor, no icterus.   Neck:   No JVD, no thyromegaly, no lymphadenopathy. Chest:  Air entry fair bilaterally, no added sounds  Cardiac: S1 S2 RR no murmurs  Abdomen:  Soft, non-tender, no masses or organomegally appreciable, BS sluggish. :   No suprapubic or flank tenderness. Neuro:  Sedated on ventilator. Skin:   No rashes, good skin turgor. Extremities:  2-3+ edema, palpable peripheral pulses, no cyanosis, no calf tenderness. Labs:       Recent Labs     12/05/20 0327 12/06/20 0622 12/07/20  0603   WBC 12.6* 11.5* 12.2*   RBC 3.61* 3.81* 2.93*   HGB 10.4* 11.0* 8.5*   HCT 35.9* 39.3* 28.9*   MCV 99.4 103.1* 98.6   MCH 28.8 28.9 29.0   MCHC 29.0 28.0* 29.4   RDW 15.9* 15.9* 15.6*    255 223   MPV 10.5 10.9 11.3      BMP:   Recent Labs     12/05/20 0327 12/06/20 0622 12/07/20  0603   * 148* 147*   K 4.5 4.5 4.5   * 109* 107   CO2 32* 29 28   BUN 42* 43* 53*   CREATININE 1.37* 1.32* 1.61*   GLUCOSE 151* 145* 155*   CALCIUM 8.1* 8.4* 8.3*      Phosphorus:   No results for input(s): PHOS in the last 72 hours. Magnesium:  No results for input(s): MG in the last 72 hours.   Albumin:    Recent Labs     12/05/20 0327 12/06/20 0622 12/07/20  0603   LABALBU 2.1* 1.9* 2.0*     BNP:    No results found for: BNP  STEPHEN:    No results found for: STEPHEN  SPEP:  Lab Results   Component Value Date    PROT 7.7 12/07/2020     UPEP:   No results found for: LABPE  C3:   No results found for: C3  C4:   No results found for: C4  MPO ANCA:   No results found for: MPO  PR3 ANCA:   No results found for: PR3  Anti-GBM:   No results found for: GBMABIGG  Hep BsAg:       No results found for: HEPBSAG  Hep C AB:        No results found for: HEPCAB    Urinalysis/Chemistries:      Lab Results   Component Value Date    NITRU NEGATIVE 12/02/2020    COLORU DARK YELLOW 12/02/2020    PHUR 5.0 12/02/2020    WBCUA 5 TO 10 12/02/2020    RBCUA 0 TO 2 12/02/2020    MUCUS NOT REPORTED 12/02/2020    TRICHOMONAS NOT REPORTED 12/02/2020    YEAST NOT REPORTED 12/02/2020    BACTERIA

## 2020-12-07 NOTE — FLOWSHEET NOTE
Pt's mother Cindy Fay called to inquire about pt's Oxygen level on ventilator. Updated on Current status. She asked if I would let her son know she's going back to work tomorrow morning & will be in to see him afterwards. Pt informed. He nods in acknowledgement.

## 2020-12-07 NOTE — PLAN OF CARE
Problem: Skin Integrity:  Goal: Will show no infection signs and symptoms  Description: Will show no infection signs and symptoms  Outcome: Ongoing  Goal: Absence of new skin breakdown  Description: Absence of new skin breakdown  Outcome: Ongoing     Problem: Nutrition  Goal: Optimal nutrition therapy  Description: Nutrition Problem #1: Inadequate oral intake  Intervention: Food and/or Nutrient Delivery: Continue NPO, Start Tube Feeding  Nutritional Goals: EN intake to meet % of estimated nutrition needs     Outcome: Ongoing     Problem: OXYGENATION/RESPIRATORY FUNCTION  Goal: Patient will maintain patent airway  12/7/2020 0847 by Rashmi Fernandez RN  Outcome: Ongoing  12/7/2020 0808 by Micaela Tan RCP  Outcome: Ongoing  12/6/2020 2202 by Javon Mcqueen  Outcome: Ongoing  Goal: Patient will achieve/maintain normal respiratory rate/effort  Description: Respiratory rate and effort will be within normal limits for the patient  12/7/2020 0847 by Rashmi Fernandez RN  Outcome: Ongoing  12/7/2020 0808 by Micaela Tan RCP  Outcome: Ongoing  12/6/2020 2202 by Javon Mcqueen  Outcome: Ongoing     Problem: MECHANICAL VENTILATION  Goal: Patient will maintain patent airway  12/7/2020 0847 by Rashmi Fernandez RN  Outcome: Ongoing  12/7/2020 0808 by Micaela Tan RCP  Outcome: Ongoing  12/6/2020 2202 by Javon Mcqueen  Outcome: Ongoing  Goal: Oral health is maintained or improved  12/7/2020 0847 by Rashmi Fernandez RN  Outcome: Ongoing  12/7/2020 0808 by Micaela Tan RCP  Outcome: Ongoing  12/6/2020 2202 by Javon Mcqueen  Outcome: Ongoing  Goal: ET tube will be managed safely  12/7/2020 0847 by Rashmi Fernandez RN  Outcome: Ongoing  12/7/2020 0808 by Micaela Tan RCP  Outcome: Ongoing  12/6/2020 2202 by Javon Mcqueen  Outcome: Ongoing  Goal: Ability to express needs and understand communication  12/7/2020 0847 by Rashmi Fernandez RN  Outcome: Ongoing  12/7/2020 0808 by Micaela Tan RCP  Outcome: Ongoing  12/6/2020 2202 by Edy Siegel  Outcome: Ongoing  Goal: Mobility/activity is maintained at optimum level for patient  12/7/2020 0847 by David Cordova RN  Outcome: Ongoing  12/7/2020 0808 by Suzan Fairbanks RCP  Outcome: Ongoing  12/6/2020 2202 by Edy Siegel  Outcome: Ongoing     Problem: Confusion - Acute:  Goal: Absence of continued neurological deterioration signs and symptoms  Description: Absence of continued neurological deterioration signs and symptoms  Outcome: Ongoing  Goal: Mental status will be restored to baseline  Description: Mental status will be restored to baseline  Outcome: Ongoing     Problem: Discharge Planning:  Goal: Ability to perform activities of daily living will improve  Description: Ability to perform activities of daily living will improve  Outcome: Ongoing  Goal: Participates in care planning  Description: Participates in care planning  Outcome: Ongoing     Problem: Injury - Risk of, Physical Injury:  Goal: Absence of physical injury  Description: Absence of physical injury  Outcome: Ongoing  Goal: Will remain free from falls  Description: Will remain free from falls  Outcome: Ongoing     Problem: Mood - Altered:  Goal: Mood stable  Description: Mood stable  Outcome: Ongoing  Goal: Absence of abusive behavior  Description: Absence of abusive behavior  Outcome: Ongoing  Goal: Verbalizations of feeling emotionally comfortable while being cared for will increase  Description: Verbalizations of feeling emotionally comfortable while being cared for will increase  Outcome: Ongoing     Problem: Psychomotor Activity - Altered:  Goal: Absence of psychomotor disturbance signs and symptoms  Description: Absence of psychomotor disturbance signs and symptoms  Outcome: Ongoing     Problem: Sensory Perception - Impaired:  Goal: Demonstrations of improved sensory functioning will increase  Description: Demonstrations of improved sensory functioning will increase  Outcome: Ongoing  Goal: Decrease in sensory misperception frequency  Description: Decrease in sensory misperception frequency  Outcome: Ongoing  Goal: Able to refrain from responding to false sensory perceptions  Description: Able to refrain from responding to false sensory perceptions  Outcome: Ongoing  Goal: Demonstrates accurate environmental perceptions  Description: Demonstrates accurate environmental perceptions  Outcome: Ongoing  Goal: Able to distinguish between reality-based and nonreality-based thinking  Description: Able to distinguish between reality-based and nonreality-based thinking  Outcome: Ongoing  Goal: Able to interrupt nonreality-based thinking  Description: Able to interrupt nonreality-based thinking  Outcome: Ongoing     Problem: Sleep Pattern Disturbance:  Goal: Appears well-rested  Description: Appears well-rested  Outcome: Ongoing

## 2020-12-07 NOTE — PLAN OF CARE
Problem: OXYGENATION/RESPIRATORY FUNCTION  Goal: Patient will maintain patent airway  12/6/2020 2202 by Choco Dhaliwal  Outcome: Ongoing     Problem: OXYGENATION/RESPIRATORY FUNCTION  Goal: Patient will achieve/maintain normal respiratory rate/effort  Description: Respiratory rate and effort will be within normal limits for the patient  12/6/2020 2202 by Choco Dhaliwal  Outcome: Ongoing     Problem: MECHANICAL VENTILATION  Goal: Patient will maintain patent airway  12/6/2020 2202 by Choco Dhaliwal  Outcome: Ongoing     Problem: MECHANICAL VENTILATION  Goal: Oral health is maintained or improved  12/6/2020 2202 by Choco Dhaliwal  Outcome: Ongoing     Problem: MECHANICAL VENTILATION  Goal: ET tube will be managed safely  12/6/2020 2202 by Choco Dhaliwal  Outcome: Ongoing     Problem: MECHANICAL VENTILATION  Goal: Ability to express needs and understand communication  12/6/2020 2202 by Choco Dhaliwal  Outcome: Ongoing     Problem: MECHANICAL VENTILATION  Goal: Mobility/activity is maintained at optimum level for patient  12/6/2020 2202 by Choco Dhaliwal  Outcome: Ongoing

## 2020-12-07 NOTE — PROGRESS NOTES
will change propofol otherwise currently he is tolerating propofol. We will get chest x-ray for follow-up of right lower lobe atelectasis and bilateral effusion. Discussed with nursing staff, treatment and plan discussed. Discussed with respiratory therapist.    Total critical care time caring for this patient with life threatening, unstable organ failure, including direct patient contact, management of life support systems, review of data including imaging and labs, discussions with other team members and physicians at least [id number] [address] so far today, excluding procedures. Please note that this chart was generated using voice recognition Dragon dictation software. Although every effort was made to ensure the accuracy of this automated transcription, some errors in transcription may have occurred.      Josh Vergara MD  12/7/2020 10:55 AM

## 2020-12-07 NOTE — PROGRESS NOTES
Comprehensive Nutrition Assessment    Type and Reason for Visit:  Reassess    Nutrition Recommendations/Plan: Modify Tube Feeding-suggest change TF to Low Yemi, High Pro formula at 30 mL/hr with 3 protein modulars/day to meet est pro needs without exceeding kcal needs while on propofol at current rate (total will = approx 2286 kcal, and 141 g pro/day). Will continue to monitor TF tolerance/adequacy. Nutrition Assessment:  Pt remains on vent. Propofol currently at 47.5 mL/hr. Immune Enhancing TF currently at 70 mL/hr + receiving free water flushes of 400 mL every 6 hours, per Nephrology. Labs reviewed: Na 147 mmol/L. Malnutrition Assessment:  Malnutrition Status:  Insufficient data      Estimated Daily Nutrient Needs:  Energy (kcal):  4580-6499 kcal/day; Weight Used for Energy Requirements:  Ideal     Protein (g):  146-183 g protein/d (2.0-2.5 g/kg IBW); Weight Used for Protein Requirements:  Ideal(73 kg)          Wounds:  Cellulitis       Current Nutrition Therapies:    DIET TUBE FEED CONTINUOUS/CYCLIC NPO; Immune Enhancing (Pivot 1.5 Yemi);  Nasogastric; Continuous; 25; 70; 24  Current Tube Feeding (TF) Orders:  · Feeding Route: Nasogastric  · Formula: Immune Enhancing  · Schedule: Continuous   · Water Flushes: 400 mL every 6 hours   · Current TF & Flush Orders Provides: 2520 kcal and 158 g pro/day (3774 kcal/day with current propofol)    Additional Calorie Sources:   Propofol at 47.5 mL/eb=6802 kcal/day    Anthropometric Measures:  · Height: 5' 9\" (175.3 cm)  · Current Body Weight: 602 lb 11.8 oz (273.4 kg)   · Admission Body Weight: 264 lb (119.7 kg)(Stated per EMS)    · Ideal Body Weight: 160 lbs; % Ideal Body Weight  376%   · BMI: 89  · BMI Categories: Obese Class 3 (BMI 40.0 or greater)       Nutrition Diagnosis:   · Inadequate oral intake related to impaired respiratory function as evidenced by NPO or clear liquid status due to medical condition, nutrition support - enteral nutrition    Nutrition Interventions:   Food and/or Nutrient Delivery:  Modify Tube Feeding-suggest change TF to Low Yemi, High Pro formula at 30 mL/hr with 3 protein modulars/day to meet est pro needs without exceeding kcal needs while on propofol at current rate. Nutrition Education/Counseling:  No recommendation at this time   Coordination of Nutrition Care:  Continue to monitor while inpatient    Goals:  EN intake to meet % of estimated nutrition needs-goal achieved        Nutrition Monitoring and Evaluation:   Behavioral-Environmental Outcomes:  None Identified   Food/Nutrient Intake Outcomes:  Enteral Nutrition Intake/Tolerance  Physical Signs/Symptoms Outcomes:  Biochemical Data, Nutrition Focused Physical Findings, Skin, Weight, Fluid Status or Edema     Discharge Planning:     Too soon to determine     Electronically signed by Nani Palmer RD, GAYATRI on 12/7/20 at 12:27 PM EST    Contact: [phone]

## 2020-12-07 NOTE — PROCEDURES
Procedure Note:     Description of procedure      [X] Fiberoptic Bronchoscopy   [X]  Bronchoalveolar lavage   []  Bronchial washing   []  Bronchial brushing   []  Transbronchial biopsy   []  Endobronchial biopsy   []  Transbronchial needle aspiration   []  Endobronchial ultrasound-guided   []  Pretracheal node   []  Subcarinal node   []  Right hilar node   []  Left hilar node   []  AP window node   []  Mass   []  Interventional procedure  []  With fluoroscopy   []  Balloon dilatation   []  Stent placement   []  Argon plasma coagulation   []  Nd:YAG laser  []  Inspection only   []  With fluoroscopic guidance. []  With ultrasound guidance. Indication for Bronchoscopy     []  Nodule(s). [] Atelectasis  [] Hemoptysis  [X] Pneumonia/Pulmonary infiltrate  [X] Respiratory failure/Hypoxemia  [] Airway Stenosis/Obstruction  []  Endotracheal mass/obstruction  [] Endobronchial mass/obstruction  [] Adenopathy. [] Immunocompromised host.     [] Lung cancer  [] Metastatic disease.  [] Lung transplant diagnostic/surveillance. [] Bronchiectasis. [X] Therapeutic, to clear airway secretions    Consent    [X] Performed emergently  [] Details of the procedure were explained in detail which included risks and benefits. An opportunity was provided to ask questions. Consent was obtained from the following person(s):   [] Patient   [] Sibling   [] Spouse              [] Parent   [] Child   [] Guardian    Documentation Review     [X] Patient admission history and physical examination as well as interval hospital course documentation reviewed prior to initiating procedure and/or procedural sedation    Time out     [X] The process of patient care was interrupted for all participants to confirm the correct patient, correct procedure, correct site and the availability of appropriate equipment. Please see nursing documentation for those present.     Quick Look     [X] Prior to initiating procedural sedation a \"quick look\" revealed pulse oximetry and vital signs appropriate to proceed    Topical Anesthesia     [X] Cetacaine Spray. [X] Lidocaine.        [X] Topical 1% lidocaine instilled through bronchoscope      Sedation: Patient was already on propofol infusion, additional pushes of fentanyl and Versed given, see MAR for administration time and dose. Airway   [X] Via mouth (endotracheal tube). [] Via nares. Monitoring  [X] Arterial Pressure   [X] NIBP     [X] ECG   [X] Oxygen supplemented as needed   [X] Pulse oximetry  [X] End-tidal capnography     Endobronchial findings    Distal trachea: Normal mucosa, dynamic airway collapse  Nadege  Normal mucosa and Sharp    Right Main Stem Bronchus  Normal mucosa  Right Upper Lobe Bronchi Normal mucosa  Right Middle Lobe Bronchi  Normal mucosa  Right Lower Lobe Bronchi (including the Superior segment) mild amount of mucopurulent secretions    Left Main Stem Bronchus Normal mucosa  Left Upper Lobe Bronchus, Upper Division Normal mucosa  Left Upper Lobe Bronchus, Lingula  Normal mucosa  Left Lower Lobe Bronchus (including the Superior segment) mild to moderate amount of mucopurulent secretions    [X] The bronchoscope was withdrawn without difficulty. [X] The patient tolerated the procedure well. Patient condition: stable    Estimated blood loss: None    Complications: None    Chest radiograph:              [X] None             [] Pending             [] Reviewed                       [] No pneumothorax                      [] No change compared to prior    Specimens Obtained:     Specimen Bronchoalveolar Lavage Bronchial Wash Bronchial [place] Transbronchial Biopsy Endobronchial Biopsy    mL Instilled/Lavaged       Segment        RUL        RML  60/30       RLL        UMESH        Lingula        LLL          Labs requested:    Bronchoalveolar lavage for: Gram's stain and culture, Cell count, Cytology.        Final Impression: Bibasilar atelectasis with mild mucous raffyging      Theo Holden MD  Pulmonary and Critical Care Medicine

## 2020-12-07 NOTE — CONSULTS
[facility] -   Infectious diseases evaluation  admission date 12/2/2020    reason for consultation:   LLE cellulitis    Impression :   Current:  · LLE Cellulitis improved  · abd wall cellulitis resolved  · Acute hypoxic hypercapnic respiratory failure  · Leucocytosis  · Persistent fever -   · pulm mucous plugs, removed by bronch -BAL cx neg    Other:  ·   Discussion / summary of stay / plan of care   ·   Recommendations   · Currently on zosyn,   · vanco stopped  · Follow blood cx       Infection Control Recommendations   · [place] Precautions    Antimicrobial Stewardship Recommendations   · Simplification of therapy  · Targeted therapy  Coordination ofOutpatient Care:   · Estimated Length of IV antimicrobials:  · Patient will need Midline / picc Catheter Insertion:   · Patient will need SNF:  · Patient will need outpatient wound care:     History of Present Illness:   Initial history:  Vita Cole is a 37y.o.-year-old male with hx of ERA on BiPAP with morbid obesity presented to [facility] with complaint of pain and swelling to [facility] on 11/27/2020. Per mother, patient had similar symptoms 2-3 months ago and was seen at [facility] and was discharged on antibiotics. Patient completed course at that time and symptoms resolved. Mother states patient now has similar symptoms to E and now to abdomen. LLE xray at [facility] completed - negative. BLE venous doppler negative. Patient had worsening respiratory status at [address] with respiratory acidosis requiring intubation and transfer to [facility].    At this time LLE cellulitis improved and no cellulites noted to abdomen. Currently on zosyn, vanco stopped for renal protection. Patient continuously running fever. Tmax 102.6 today,  Bedside bronchoscopy revealed basilar atelectasis with mild mucous plugging. So far cultures negative.  CXR indicating worsening pulmonary edema and increased Neurological:      Comments: Calm on vent         Past Medical History:   No past medical history on file. Past Surgical  History:   No past surgical history on file. Medications:      midodrine  10 mg Oral TID    piperacillin-tazobactam  3.375 g Intravenous Q8H    lidocaine PF  5 mL Nebulization Once    lansoprazole  30 mg Per NG tube QAM AC    sodium chloride flush  10 mL Intravenous 2 times per day    enoxaparin  40 mg Subcutaneous BID       Social History:     Social History     Socioeconomic History    Marital status: Single     Spouse name: Not on file    Number of children: Not on file    Years of education: Not on file    Highest education level: Not on file   Occupational History    Not on file   Social Needs    Financial resource strain: Not on file    Food insecurity     Worry: Not on file     Inability: Not on file    Transportation needs     Medical: Not on file     Non-medical: Not on file   Tobacco Use    Smoking status: Not on file   Substance and Sexual Activity    Alcohol use: Not on file    Drug use: Not on file    Sexual activity: Not on file   Lifestyle    Physical activity     Days per week: Not on file     Minutes per session: Not on file    Stress: Not on file   Relationships    Social connections     Talks on phone: Not on file     Gets together: Not on file     Attends Yarsani service: Not on file     Active member of club or organization: Not on file     Attends meetings of clubs or organizations: Not on file     Relationship status: Not on file    Intimate partner violence     Fear of current or ex partner: Not on file     Emotionally abused: Not on file     Physically abused: Not on file     Forced sexual activity: Not on file   Other Topics Concern    Not on file   Social History Narrative    Not on file       Family History:   No family history on file.    Medical Decision Making:   I have independently reviewed/ordered the following labs:    CBC with Differential:   Recent Labs     12/06/20  0622 12/07/20  0603   WBC 11.5* 12.2*   HGB 11.0* 8.5*   HCT 39.3* 28.9*    223   LYMPHOPCT 12* 9*   MONOPCT 3 7     BMP:  Recent Labs     12/06/20  0622 12/07/20  0603   * 147*   K 4.5 4.5   * 107   CO2 29 28   BUN 43* 53*   CREATININE 1.32* 1.61*     Hepatic Function Panel:   Recent Labs     12/06/20  0622 12/07/20  0603   PROT 7.9 7.7   LABALBU 1.9* 2.0*   BILIDIR 0.27 0.33*   IBILI 0.20 0.23   BILITOT 0.47 0.56   ALKPHOS 93 84   ALT 36 36   AST 41* 43*     No results for input(s): RPR in the last 72 hours. No results for input(s): HIV in the last 72 hours. No results for input(s): BC in the last 72 hours. Lab Results   Component Value Date    CREATININE 1.61 12/07/2020    GLUCOSE 155 12/07/2020       Detailed results: Thank you for allowing us to participate in the care of this patient. Please call with questions. This note is created with the assistance of a speech recognition program.  While intending to generate adocument that actually reflects the content of the visit, the document can still have some errors including those of syntax and sound a like substitutions which may escape proof reading. It such instances, actual meaningcan be extrapolated by contextual diversion. [address] [place] [address]  Office: [phone]  Perfect serve / office [phone]    I have discussed the care of the patient, including pertinent history and exam findings,  with the student. I have seen and examined the patient and the key elements of all parts of the encounter have been performed by me.   I agree with the assessment, plan and orders as documented by the student    Denice Christine, Infectious Diseases

## 2020-12-08 NOTE — PLAN OF CARE
Problem: Restraint Use - Nonviolent/Non-Self-Destructive Behavior:  Goal: Absence of restraint-related injury  Description: Absence of restraint-related injury  Outcome: Met This Shift     Problem: Skin Integrity:  Goal: Will show no infection signs and symptoms  Description: Will show no infection signs and symptoms  Outcome: Ongoing  Goal: Absence of new skin breakdown  Description: Absence of new skin breakdown  Outcome: Ongoing     Problem: Nutrition  Goal: Optimal nutrition therapy  Description: Nutrition Problem #1: Inadequate oral intake  Intervention: Food and/or Nutrient Delivery: Continue NPO, Start Tube Feeding  Nutritional Goals: EN intake to meet % of estimated nutrition needs     Outcome: Ongoing     Problem: OXYGENATION/RESPIRATORY FUNCTION  Goal: Patient will maintain patent airway  12/8/2020 0805 by Carol Longoria RN  Outcome: Ongoing  12/8/2020 0752 by Javier Huff RCP  Outcome: Ongoing  Goal: Patient will achieve/maintain normal respiratory rate/effort  Description: Respiratory rate and effort will be within normal limits for the patient  12/8/2020 0805 by Carol Longoria RN  Outcome: Ongoing  12/8/2020 0752 by Javier Huff RCP  Outcome: Ongoing     Problem: MECHANICAL VENTILATION  Goal: Patient will maintain patent airway  12/8/2020 0805 by Carol Longoria RN  Outcome: Ongoing  12/8/2020 0752 by Javier Huff RCP  Outcome: Ongoing  Goal: Oral health is maintained or improved  12/8/2020 0805 by Carol Longoria RN  Outcome: Ongoing  12/8/2020 0752 by Javier Huff RCP  Outcome: Ongoing  Goal: ET tube will be managed safely  12/8/2020 0805 by Carol Longoria RN  Outcome: Ongoing  12/8/2020 0752 by Javier Huff RCP  Outcome: Ongoing  Goal: Ability to express needs and understand communication  12/8/2020 0805 by Carol Longoria RN  Outcome: Ongoing  12/8/2020 0752 by Javier Huff RCP  Outcome: Ongoing  Goal: Mobility/activity is maintained at optimum level for patient  12/8/2020 0805 by Mark Poe RN  Outcome: Ongoing  12/8/2020 0752 by Britt Conrad RCP  Outcome: Ongoing     Problem: Confusion - Acute:  Goal: Absence of continued neurological deterioration signs and symptoms  Description: Absence of continued neurological deterioration signs and symptoms  Outcome: Ongoing  Goal: Mental status will be restored to baseline  Description: Mental status will be restored to baseline  Outcome: Ongoing     Problem: Discharge Planning:  Goal: Ability to perform activities of daily living will improve  Description: Ability to perform activities of daily living will improve  Outcome: Ongoing  Goal: Participates in care planning  Description: Participates in care planning  Outcome: Ongoing     Problem: Injury - Risk of, Physical Injury:  Goal: Absence of physical injury  Description: Absence of physical injury  Outcome: Ongoing  Goal: Will remain free from falls  Description: Will remain free from falls  Outcome: Ongoing     Problem: Mood - Altered:  Goal: Mood stable  Description: Mood stable  Outcome: Ongoing  Goal: Absence of abusive behavior  Description: Absence of abusive behavior  Outcome: Ongoing  Goal: Verbalizations of feeling emotionally comfortable while being cared for will increase  Description: Verbalizations of feeling emotionally comfortable while being cared for will increase  Outcome: Ongoing     Problem: Psychomotor Activity - Altered:  Goal: Absence of psychomotor disturbance signs and symptoms  Description: Absence of psychomotor disturbance signs and symptoms  Outcome: Ongoing     Problem: Sensory Perception - Impaired:  Goal: Demonstrations of improved sensory functioning will increase  Description: Demonstrations of improved sensory functioning will increase  Outcome: Ongoing  Goal: Decrease in sensory misperception frequency  Description: Decrease in sensory misperception frequency  Outcome: Ongoing  Goal: Able to refrain from responding to false sensory perceptions  Description: Able to refrain from responding to false sensory perceptions  Outcome: Ongoing  Goal: Demonstrates accurate environmental perceptions  Description: Demonstrates accurate environmental perceptions  Outcome: Ongoing  Goal: Able to distinguish between reality-based and nonreality-based thinking  Description: Able to distinguish between reality-based and nonreality-based thinking  Outcome: Ongoing  Goal: Able to interrupt nonreality-based thinking  Description: Able to interrupt nonreality-based thinking  Outcome: Ongoing     Problem: Sleep Pattern Disturbance:  Goal: Appears well-rested  Description: Appears well-rested  Outcome: Ongoing     Problem: Airway Clearance - Ineffective:  Goal: Ability to maintain a clear airway will improve  Description: Ability to maintain a clear airway will improve  Outcome: Ongoing     Problem: Anxiety/Stress:  Goal: Level of anxiety will decrease  Description: Level of anxiety will decrease  Outcome: Ongoing     Problem: Aspiration:  Goal: Absence of aspiration  Description: Absence of aspiration  Outcome: Ongoing     Problem: Cardiac Output - Decreased:  Goal: Hemodynamic stability will improve  Description: Hemodynamic stability will improve  Outcome: Ongoing     Problem: Fluid Volume - Imbalance:  Goal: Absence of imbalanced fluid volume signs and symptoms  Description: Absence of imbalanced fluid volume signs and symptoms  Outcome: Ongoing     Problem: Gas Exchange - Impaired:  Goal: Levels of oxygenation will improve  Description: Levels of oxygenation will improve  Outcome: Ongoing     Problem: Nutrition Deficit:  Goal: Ability to achieve adequate nutritional intake will improve  Description: Ability to achieve adequate nutritional intake will improve  Outcome: Ongoing     Problem: Pain:  Goal: Pain level will decrease  Description: Pain level will decrease  Outcome: Ongoing  Goal: Recognizes and communicates pain  Description: Recognizes and communicates pain  Outcome: Ongoing  Goal: Control of acute pain  Description: Control of acute pain  Outcome: Ongoing     Problem: Serum Glucose Level - Abnormal:  Goal: Ability to maintain appropriate glucose levels will improve to within specified parameters  Description: Ability to maintain appropriate glucose levels will improve to within specified parameters  Outcome: Ongoing     Problem: Skin Integrity - Impaired:  Goal: Will show no infection signs and symptoms  Description: Will show no infection signs and symptoms  Outcome: Ongoing  Goal: Absence of new skin breakdown  Description: Absence of new skin breakdown  Outcome: Ongoing     Problem: Tissue Perfusion, Altered:  Goal: Circulatory function within specified parameters  Description: Circulatory function within specified parameters  Outcome: Ongoing     Problem: Tissue Perfusion - Cardiopulmonary, Altered:  Goal: Absence of angina  Description: Absence of angina  Outcome: Ongoing  Goal: Hemodynamic stability will improve  Description: Hemodynamic stability will improve  Outcome: Ongoing     Problem: Restraint Use - Nonviolent/Non-Self-Destructive Behavior:  Goal: Absence of restraint indications  Description: Absence of restraint indications  Outcome: Not Met This Shift

## 2020-12-08 NOTE — PROGRESS NOTES
Spoke with vascular per Dr. Char Hernandez request for venous dopplers, tech stated they would try to make it over tonight if able.

## 2020-12-08 NOTE — PROGRESS NOTES
Ventilator Bronchodilator assessment    Breath sounds: diminished, clear  Inspiratory Pressure: 38  Plateau Pressure: 28    Patient assessed at level 1    Pt peak pressuring, morbidly obese no treatment needed at this time      [x]    Bronchodilator Assessment    BRONCHODILATOR ASSESSMENT SCORE  Score 0 (Home) 1 2 3 4   Breath Sounds   []  Chronic Ventilator: Patient at baseline [x]  Mild Wheezes/ Clear []  Intermittent wheezes with good air entry []  Bilateral/unilateral wheezing with diminished air entry []  Insp/Exp wheeze and/or poor aeration   Ventilator Pressures   []  Chronic Ventilator []  Insp. Pressure less than 25 cm H20 []  Insp. Pressure less than 25 cm H20 []  Insp. Pressure exceeds 25 cm H20 []  Insp.  Pressure exceeds 30 cm H20   Plateau Pressure []  NA   []  Plateau Pressure less than 4  []  Plateau Pressure less than or equal to 5 []  Plateau Pressure greater than or equal to 6 []  Plateau Pressure greater than or equal to [facility]  7:53 AM

## 2020-12-08 NOTE — PROGRESS NOTES
INTENSIVE CARE UNIT  Resident Physician Progress Note    Patient - Scott Castle  Date of Admission -  2020  4:33 AM  Date of Evaluation -  2020  Room and Bed Number -  0105/0105-01   Hospital Day - 6      SUBJECTIVE:     OVERNIGHT EVENTS:      Patient is currently intubated and sedated with propofol 25. [place] PRVC 20/530/20/100, pH 7.4, carbon dioxide 50.1, oxygen 54.9, bicarb 31.4  Off of diuretics. Received a liter bolus yesterday due to worsening creatinine and hypotension. Started on midodrine by nephrology. ID recommended to continue Zosyn. Overnight patient has T-max of 101. WBC 11.5. Hypernatremia resolved sodium 143. Patient is on free water flushes. Continue spontaneous breathing trial, continue sedation holiday.         TODAY:      AWAKE & FOLLOWING COMMANDS:  [x] No   [] Yes    SECRETIONS Amount:  [] Small [] Moderate  [] Large  [x] None  Color:     [] White [] Colored  [] Bloody    SEDATION:  RAAS Score:  [x] Propofol gtt  [] Versed gtt  [] Ativan gtt   [] No Sedation    PARALYZED:  [x] No    [] Yes    VASOPRESSORS:  [x] No    [] Yes  [] Levophed [] Dopamine [] Vasopressin  [] Dobutamine [] Phenylephrine [] Epinephrine      OBJECTIVE:     VITAL SIGNS:  /60   Pulse 84   Temp 101 °F (38.3 °C) (Oral)   Resp 24   Ht 5' 9\" (1.753 m)   Wt (!) 608 lb 14.6 oz (276.2 kg)   SpO2 95%   BMI 89.92 kg/m²   Tmax over 24 hours:  Temp (24hrs), Av.5 °F (38.1 °C), Min:98.2 °F (36.8 °C), Max:102.6 °F (39.2 °C)      Patient Vitals for the past 8 hrs:   BP Temp Temp src Pulse Resp SpO2 Weight   20 0700 108/60 -- -- 84 24 95 % --   20 0616 (!) 109/57 -- -- 86 20 95 % --   20 0524 -- -- -- -- -- -- (!) 608 lb 14.6 oz (276.2 kg)   12/08/20 0433 -- 101 °F (38.3 °C) Oral -- -- -- --   20 (!) 100/50 -- -- 85 24 91 % --   20 -- -- -- -- -- 91 % --   20 -- -- -- -- -- 92 % --   20 -- -- -- -- -- 90 % --   20 (!) 104/51 -- -- 88 26 (!) 85 % --   12/08/20 0245 -- -- -- -- -- (!) 76 % --   12/08/20 0239 -- -- -- 84 29 90 % --   12/08/20 0230 -- -- -- -- -- (!) 89 % --   12/08/20 0215 -- -- -- -- -- (!) 89 % --   12/08/20 0200 (!) 99/46 -- -- 87 25 (!) 89 % --   12/08/20 0145 -- -- -- -- -- (!) 89 % --   12/08/20 0130 -- -- -- -- -- 90 % --         Intake/Output Summary (Last 24 hours) at 12/8/2020 0919  Last data filed at 12/8/2020 6399  Gross per 24 hour   Intake 4795 ml   Output 1705 ml   Net 3090 ml     Date 12/08/20 0000 - 12/08/20 2359   Shift 8126-0606 2681-6477 4216-4960 24 Hour Total   INTAKE   I.V.(mL/kg) 830(3)   830(3)   NG/GT(mL/kg) 1050(3.8)   1050(3.8)   Shift Total(mL/kg) 1880(6.8)   1880(6.8)   OUTPUT   Urine(mL/kg/hr) 395(0.2)   395   Shift Total(mL/kg) 395(1.4)   395(1.4)   Weight (kg) 276.2 276.2 276.2 276.2     Wt Readings from Last 3 Encounters:   12/08/20 (!) 608 lb 14.6 oz (276.2 kg)     Body mass index is 89.92 kg/m². PHYSICAL EXAM:  Constitutional: Intubated, sedated. Following commands off sedation. Morbidly obese (BMI 86)  EENT: PERRLA, EOMI, sclera clear, anicteric, oropharynx clear  Neck: Large neck, supple, symmetrical, trachea midline, no adenopathy, thyroid symmetric  Respiratory: Intubated, equal air entry bilaterally, no rales, rhonchi  Cardiovascular: regular rate and rhythm, normal S1, S2, no murmur noted, pulses identified with Doppler   Abdomen: Cellulitis improving  Neurological: Sedated  Extremities: Left lower extremity swelling which is improving.     MEDICATIONS:  Scheduled Meds:   midodrine  10 mg Oral TID    piperacillin-tazobactam  3.375 g Intravenous Q8H    lidocaine PF  5 mL Nebulization Once    lansoprazole  30 mg Per NG tube QAM AC    sodium chloride flush  10 mL Intravenous 2 times per day    enoxaparin  40 mg Subcutaneous BID     Continuous Infusions:   propofol 20 mcg/kg/min (12/08/20 0824)     PRN Meds:   acetaminophen, 650 mg, Q4H PRN    Or  acetaminophen, 650 mg, Q4H PRN  sodium chloride flush, 10 mL, PRN  polyethylene glycol, 17 g, Daily PRN  promethazine, 12.5 mg, Q6H PRN    Or  ondansetron, 4 mg, Q6H PRN        SUPPORT DEVICES: [x] Ventilator [] BIPAP  [] Nasal Cannula [] Room Air    VENT SETTINGS (Comprehensive) (if applicable):    Vent Information  $Ventilation: $Subsequent Day  Skin Assessment: Clean, dry, & intact  Equipment ID: [id number]  Equipment Changed: Expiratory Filter(insp filter)  Vent Type: Servo i  Vent Mode: PRVC  Vt Ordered: 530 mL  Rate Set: 20 bmp  Pressure Support: 10 cmH20  FiO2 : 100 %  SpO2: 95 %  SpO2/FiO2 ratio: 90  Sensitivity: 5  PEEP/CPAP: (S) 20(SpO2 not improving)  I Time/ I Time %: 0.8 s  Humidification Source: Heated wire  Humidification Temp: 35.6  Humidification Temp Measured: 35.6  Circuit Condensation: Drained  Nitric Oxide/Epoprostenol In Use?: No  Additional Respiratory  Assessments  Pulse: 84  Resp: 24  SpO2: 95 %  $End Tidal CO2: 42  Position: Semi-Moncada's  Humidification Source: Heated wire  Humidification Temp: 35.6  Circuit Condensation: Drained  Oral Care Completed?: Yes  Oral Care: Mouthwash, Mouth swabbed, Mouth moisturizer, Mouth suctioned  Subglottic Suction Done?: Yes  Cuff Pressure (cm H2O): (MLT)  Skin barrier applied: Yes    ABGs:   Lab Results   Component Value Date    VIE6JBH 33 12/08/2020    FIO2 100.0 12/08/2020         DATA:  Complete Blood Count:   Recent Labs     12/06/20  0622 12/07/20  0603 12/08/20  0411   WBC 11.5* 12.2* 11.5*   RBC 3.81* 2.93* 3.32*   HGB 11.0* 8.5* 9.5*   HCT 39.3* 28.9* 31.4*   .1* 98.6 94.6   MCH 28.9 29.0 28.6   MCHC 28.0* 29.4 30.3   RDW 15.9* 15.6* 15.7*    223 See Reflexed IPF Result   MPV 10.9 11.3 NOT REPORTED        Last 3 Blood Glucose:   Recent Labs     12/06/20  0622 12/07/20  0603 12/08/20  0655   GLUCOSE 145* 155* 113*        PT/INR:  No results found for: PROTIME, INR  PTT:  No results found for: APTT, PTT    Comprehensive Metabolic Profile:   Recent Labs No  [] Yes    CONSULTATION NEEDED:  [x] No  [] Yes    FAMILY UPDATED:    [] No  [] Yes    TRANSFER OUT OF ICU:   [x] No  [] Yes      Neuro:  · Sedated with propofol, following commands when off sedation.     Resp:  · Acute hypoxic hypercapnic respiratory failure: Currently worsening and requiring more of oxygen and pressure support. Covid negative. · Bronchoscopy done showing no mucous plugs. · Unable to get CT chest due to body habitus. · Continue spontaneous breathing and awakening trial.      CV:  · Regular rate, rhythm. Pulses present. BP stable  · Troponinemia, likely demand ischemia. Will trend, follow-up on echocardiogram.    · Echo at [facility] - EF 55-60%.     ID:  · LLE cellulitis improving. Vancomycin stopped due to ELIECER. · Cultures remain negative     Heme:  · Leukocytosis secondary to acute cellulitis, improving. · CBC daily     GI/Nutrition:  · Continue tube feeds at 70 mL/hour    Renal:  · ELIECER, worsening creatinine 1.98 today. · Renal ultrasound unremarkable     Prophylaxis:  · DVT: Lovenox 40 mg subQ daily  · GI: protonix 40 mg daily     Dispo:  Currently staying in ICU. Christian Coles MD       Critical Care Resident PGY-3  12/8/2020, 9:19 AM      Attending Physician Statement  I have discussed the care of Patsy Payton, including pertinent history and exam findings with the resident. I have reviewed the key elements of all parts of the encounter with the resident. I have seen and examined the patient with the resident. I agree with the assessment and plan and status of the problem list as documented. Overnight events noted, imaging studies, labs and medications reviewed. He remains on sedation with propofol when he is down wean down propofol he is able to follow commands per nursing staff. There is no increase edema on chronic edema. He had received fluid bolus yesterday. His creatinine is increased 1.98 today with BUN of 63 and bicarb of 28.   His WBC count is 11.5 hemoglobin 9.5 hematocrit 31.5  He has not been on any pressors at this time and is maintaining blood pressure above 634 systolic. He did have a fever spike of 101 yesterday, he was seen by infectious disease and he remains on Zosyn IV  He remains on high FiO2 with a PEEP of 20 without much change ventilator setting PRVC/20/530/20/100%. ABG 7.4 0/50/55  He is not a candidate for CTA chest and VQ scan because of extremely morbid obesity. [facility]  Will get venous Dopplers of the legs. Follow-up on echocardiogram.  Follow-up urine output renal function he was given a dose of Lasix by nephrology today. He remains severely hypoxic with a high PEEP, high risk for thromboembolism, admission not a candidate for CT chest or VQ scan, will start empirically on heparin drip and change Lovenox DVT prophylaxis    Discussed with nursing staff, treatment and plan discussed. Discussed with respiratory therapist.    Total critical care time caring for this patient with life threatening, unstable organ failure, including direct patient contact, management of life support systems, review of data including imaging and labs, discussions with other team members and physicians at least 36  Min so far today, excluding procedures. Please note that this chart was generated using voice recognition Dragon dictation software. Although every effort was made to ensure the accuracy of this automated transcription, some errors in transcription may have occurred.      Oscar Ambrosio MD  12/8/2020 12:06 PM

## 2020-12-08 NOTE — PROGRESS NOTES
Renal Progress Note    Patient :  Mel Mock; 37 y.o. MRN# [id number]  Location:    Attending:  Mayra Caba MD  Admit Date:  2020   Hospital Day: 6    Subjective:     Tube feeds continue at goal rate. Also on free water. Urine output better since Johnson was flushed, although it has declined slightly in the last 24 hours. Creatinine up to 1.9. Hypernatremia resolved. Insensible losses continue to be high. Vancomycin for treatment of left lower extremity cellulitis which extended up to the abdominal wall area, has now been discontinued. Continues to have fevers up to 101 °F.  FiO2 100%. Still not ventilating and oxygenating well. PCO2 53, PO2 58, saturation 89%. Johnson catheter in place. Unresponsive, sedated. Outpatient Medications:     No medications prior to admission. Current Medications:     Scheduled Meds:    midodrine  10 mg Oral 4x Daily    furosemide  40 mg Intravenous Once    piperacillin-tazobactam  3.375 g Intravenous Q8H    lidocaine PF  5 mL Nebulization Once    lansoprazole  30 mg Per NG tube QAM AC    sodium chloride flush  10 mL Intravenous 2 times per day    enoxaparin  40 mg Subcutaneous BID     Continuous Infusions:    propofol 20 mcg/kg/min (20 0824)     PRN Meds:  acetaminophen **OR** acetaminophen, sodium chloride flush, polyethylene glycol, promethazine **OR** ondansetron    Input/Output:       I/O last 3 completed shifts: In: 5617 [I.V.:2743; NG/GT:2527]  Out: 1905 [Urine:1905]    Vital Signs:   Temperature:  Temp: 101 °F (38.3 °C)  TMax:   Temp (24hrs), Av.5 °F (38.1 °C), Min:98.2 °F (36.8 °C), Max:102.6 °F (39.2 °C)    Respirations:  Resp: 24  Pulse:   Pulse: 84  BP:    BP: 108/60  BP Range: Systolic (55HGY), AFM:119 , Min:95 , HQI:115       Diastolic (16VHL), GNX:50, Min:46, Max:62      Physical Examination:     General:  Sedated on ventilator.  FiO2 100% with a PEEP of 10  HEENT:  Atraumatic, normocephalic, no throat congestion, moist mucosa. Eyes:   Pupils equal, round and reactive to light, pallor, no icterus. Neck:   No JVD, no thyromegaly, no lymphadenopathy. Chest:  Air entry fair bilaterally, no added sounds  Cardiac: S1 S2 RR no murmurs  Abdomen:  Soft, non-tender, no masses or organomegally appreciable, BS sluggish. :   No suprapubic or flank tenderness. Neuro:  Sedated on ventilator. Skin:   No rashes, good skin turgor. Extremities:  2-3+ edema, palpable peripheral pulses, no cyanosis, no calf tenderness. Labs:       Recent Labs     12/06/20  0622 12/07/20  0603 12/08/20  0411   WBC 11.5* 12.2* 11.5*   RBC 3.81* 2.93* 3.32*   HGB 11.0* 8.5* 9.5*   HCT 39.3* 28.9* 31.4*   .1* 98.6 94.6   MCH 28.9 29.0 28.6   MCHC 28.0* 29.4 30.3   RDW 15.9* 15.6* 15.7*    223 See Reflexed IPF Result   MPV 10.9 11.3 NOT REPORTED      BMP:   Recent Labs     12/06/20  0622 12/07/20  0603 12/08/20  0655   * 147* 143   K 4.5 4.5 4.7   * 107 104   CO2 29 28 28   BUN 43* 53* 63*   CREATININE 1.32* 1.61* 1.98*   GLUCOSE 145* 155* 113*   CALCIUM 8.4* 8.3* 8.3*      Phosphorus:   No results for input(s): PHOS in the last 72 hours. Magnesium:  No results for input(s): MG in the last 72 hours.   Albumin:    Recent Labs     12/06/20  0622 12/07/20  0603 12/08/20  0655   LABALBU 1.9* 2.0* 1.8*     BNP:    No results found for: BNP  STEPHEN:    No results found for: STEPHEN  SPEP:  Lab Results   Component Value Date    PROT 7.9 12/08/2020     UPEP:   No results found for: LABPE  C3:   No results found for: C3  C4:   No results found for: C4  MPO ANCA:   No results found for: MPO  PR3 ANCA:   No results found for: PR3  Anti-GBM:   No results found for: GBMABIGG  Hep BsAg:       No results found for: HEPBSAG  Hep C AB:        No results found for: HEPCAB    Urinalysis/Chemistries:      Lab Results   Component Value Date    NITRU NEGATIVE 12/02/2020    COLORU DARK YELLOW 12/02/2020    PHUR 5.0 12/02/2020    WBCUA 5 TO 10 12/02/2020    Navdeep Barr 0 TO 2 12/02/2020    MUCUS NOT REPORTED 12/02/2020    TRICHOMONAS NOT REPORTED 12/02/2020    YEAST NOT REPORTED 12/02/2020    BACTERIA NOT REPORTED 12/02/2020    SPECGRAV 1.021 12/02/2020    LEUKOCYTESUR SMALL 12/02/2020    UROBILINOGEN Normal 12/02/2020    BILIRUBINUR NEGATIVE 12/02/2020    GLUCOSEU NEGATIVE 12/02/2020    KETUA TRACE 12/02/2020    AMORPHOUS NOT REPORTED 12/02/2020     Urine Sodium:     Lab Results   Component Value Date    MARCELINO 20 12/03/2020     Urine Potassium:  No results found for: KUR  Urine Chloride:  No results found for: CLUR  Urine Osmolarity:   Lab Results   Component Value Date    OSMOU 408 12/03/2020     Urine Protein:   No results found for: TPU  Urine Creatinine:     Lab Results   Component Value Date    LABCREA 126.7 12/03/2020     Urine Eosinophils:  No components found for: UEOS    Radiology:     CXR:     Assessment:     1. Acute Kidney Injury: Secondary to ischemic ATN from intravascular volume depletion from insensible losses, systemic inflammatory response syndrome, hypotension, low flow baseline not known, creatinine presentation 1.3 now up to 1.9, element of Vanco toxicity not ruled out even though Vanco levels have been low  2. Persistent systemic inflammatory response syndrome likely from lower extremity cellulitis in the left side  3. Persistent respiratory acidosis and hypoxia  4. Respiratory failure on the ventilator, continues to require high FiO2  5. Hypovolemic hypernatremia, better  6. Morbid obesity  7. Left lower extremity cellulitis    Plan:   1. Change free water to 400 mL every 8  2. Continue tube feeds at current rate  3. Lasix 40 mg IV x1 dose  4. Continue ProAmatine 10, 3 times a day   5. Check BMP daily  6. Prognosis guarded    Nutrition   Please ensure that patient is on a renal diet/TF. Avoid nephrotoxic drugs/contrast exposure. We will continue to follow along with you.

## 2020-12-08 NOTE — PROGRESS NOTES
[facility] -   Infectious diseases evaluation  admission date 12/2/2020    reason for consultation:   LLE cellulitis    Impression :   Current:  · LLE Cellulitis improved  · abd wall cellulitis resolved  · Acute hypoxic hypercapnic respiratory failure  · Leucocytosis  · Persistent fever -   · pulm mucous plugs, removed by bronch -BAL cx neg    Other:  ·   Discussion / summary of stay / plan of care   ·   Recommendations   · Currently on zosyn,   · vanco stopped  · Follow blood cultures  · 1/2 sets of blood cultures positive for staph, represents contaminant  · Get CRP    Infection Control Recommendations   · [place] Precautions    Antimicrobial Stewardship Recommendations   · Simplification of therapy  · Targeted therapy  Coordination ofOutpatient Care:   · Estimated Length of IV antimicrobials:  · Patient will need Midline / picc Catheter Insertion:   · Patient will need SNF:  · Patient will need outpatient wound care:     History of Present Illness:   Initial history:  Twila Lovell is a 37y.o.-year-old male with hx of ERA on BiPAP with morbid obesity presented to [facility] with complaint of pain and swelling to [facility] on 11/27/2020. Per mother, patient had similar symptoms 2-3 months ago and was seen at [facility] and was discharged on antibiotics. Patient completed course at that time and symptoms resolved. Mother states patient now has similar symptoms to E and now to abdomen. LLE xray at [facility] completed - negative. BLE venous doppler negative. Patient had worsening respiratory status at [address] with respiratory acidosis requiring intubation and transfer to [facility].    At this time LLE cellulitis improved and no cellulites noted to abdomen. Currently on zosyn, vanco stopped for renal protection. Patient continuously running fever. Tmax 102.6 today,  Bedside bronchoscopy revealed basilar atelectasis with mild mucous plugging. So far cultures negative. CXR indicating worsening pulmonary edema and increased right pleural effusion. Underlying pneumonia not excluded. Interval changes  12/8/2020   Patient Vitals for the past 8 hrs:   BP Temp Temp src Pulse Resp SpO2 Weight   12/08/20 0524 -- -- -- -- -- -- (!) 608 lb 14.6 oz (276.2 kg)   12/08/20 0433 -- 101 °F (38.3 °C) Oral -- -- -- --   12/08/20 0400 (!) 100/50 -- -- 85 24 91 % --   12/08/20 0345 -- -- -- -- -- 91 % --   12/08/20 0330 -- -- -- -- -- 92 % --   12/08/20 0315 -- -- -- -- -- 90 % --   12/08/20 0300 (!) 104/51 -- -- 88 26 (!) 85 % --   12/08/20 0245 -- -- -- -- -- (!) 76 % --   12/08/20 0239 -- -- -- 84 29 90 % --   12/08/20 0230 -- -- -- -- -- (!) 89 % --   12/08/20 0215 -- -- -- -- -- (!) 89 % --   12/08/20 0200 (!) 99/46 -- -- 87 25 (!) 89 % --   12/08/20 0145 -- -- -- -- -- (!) 89 % --   12/08/20 0130 -- -- -- -- -- 90 % --   12/08/20 0115 -- -- -- -- -- (!) 84 % --   12/08/20 0114 -- -- -- -- -- (!) 84 % --   12/08/20 0100 (!) 104/54 -- -- 89 30 (!) 85 % --   12/08/20 0045 -- -- -- -- -- (!) 87 % --   12/08/20 0030 -- 98.2 °F (36.8 °C) Oral -- -- (!) 88 % --   12/08/20 0015 -- -- -- -- -- (!) 87 % --   12/08/20 0000 (!) 99/49 -- -- 90 29 (!) 87 % --   12/07/20 2345 -- -- -- -- -- (!) 85 % --   12/07/20 2330 -- -- -- -- -- (!) 81 % --   12/07/20 2315 -- -- -- -- -- (!) 82 % --   12/07/20 2300 (!) 111/57 -- -- 90 25 94 % --     Patient ventilated and sedated. Required increased respiratory support. FiO2 100, Peep 20 LLE cellulitis improved, no cellulitis to abdomen noted    Remains febrile, Tmax 101  WBC 11.5    Blood cultures 1/2 cultures positive for staph - most likely contaminant    Fever not well explained      Summary of relevant labs:  Labs:  WBC 13-12-11-12 -11.5  Cr 1.3 -  1.61 - 1.98  Lactic 1.2 - 1.6    Micro:  Blood cx 12/7 Staphylococcus detected in 1/2 cultures, contaminant  Blood cx 12/6 no growth  Sputum cx 12/6 neg  COVID neg    Imaging:  Echo 12/8: EF 45%. Leftward compression of   inter-ventricular septum (\"D-sign\") indicating RV pressure overload. Right atrium is severely dilated . Right ventricle is severely dilated with reduced systolic function. CXR 12/7: Worsening pulmonary edema and increased right pleural effusion. Underlying pneumonia is not excluded. I have personally reviewed the past medical history, past surgical history, medications, social history, and family history, and I haveupdated the database accordingly. Allergies:   Patient has no known allergies. Review of Systems:     Review of Systems   Unable to perform ROS: Intubated       Physical Examination :       Physical Exam  Constitutional:       General: He is not in acute distress. Appearance: He is obese. He is not ill-appearing. HENT:      Head: Normocephalic. Nose: Nose normal.      Mouth/Throat:      Comments: Orally intubated  Cardiovascular:      Rate and Rhythm: Normal rate and regular rhythm. Pulses: Normal pulses. Heart sounds: Normal heart sounds. Pulmonary:      Effort: Tachypnea present. Breath sounds: Normal breath sounds. Abdominal:      General: Bowel sounds are normal.      Palpations: Abdomen is soft. Genitourinary:     Comments: Johnson in, Sarahi urine  Skin:     General: Skin is warm and dry. Findings: No rash. Neurological:      Comments: Calm on vent         Past Medical History:   No past medical history on file. Past Surgical  History:   No past surgical history on file.     Medications:      midodrine  10 mg Oral TID    piperacillin-tazobactam  3.375 g Intravenous Q8H    lidocaine PF  5 mL Nebulization Once    lansoprazole  30 mg Per NG tube QAM AC    sodium chloride flush  10 mL Intravenous 2 times per day    enoxaparin  40 mg Subcutaneous BID       Social History:     Social History     Socioeconomic History    Marital status: Single     Spouse name: Not on file    Number of children: Not on file    Years of education: Not on file    Highest education level: Not on file   Occupational History    Not on file   Social Needs    Financial resource strain: Not on file    Food insecurity     Worry: Not on file     Inability: Not on file    Transportation needs     Medical: Not on file     Non-medical: Not on file   Tobacco Use    Smoking status: Not on file   Substance and Sexual Activity    Alcohol use: Not on file    Drug use: Not on file    Sexual activity: Not on file   Lifestyle    Physical activity     Days per week: Not on file     Minutes per session: Not on file    Stress: Not on file   Relationships    Social connections     Talks on phone: Not on file     Gets together: Not on file     Attends Orthodoxy service: Not on file     Active member of club or organization: Not on file     Attends meetings of clubs or organizations: Not on file     Relationship status: Not on file    Intimate partner violence     Fear of current or ex partner: Not on file     Emotionally abused: Not on file     Physically abused: Not on file     Forced sexual activity: Not on file   Other Topics Concern    Not on file   Social History Narrative    Not on file       Family History:   No family history on file. Medical Decision Making:   I have independently reviewed/ordered the following labs:    CBC with Differential:   Recent Labs     12/07/20  0603 12/08/20  0411   WBC 12.2* 11.5*   HGB 8.5* 9.5*   HCT 28.9* 31.4*    See Reflexed IPF Result   LYMPHOPCT 9* 9*   MONOPCT 7 10     BMP:  Recent Labs     12/06/20  0622 12/07/20  0603   * 147*   K 4.5 4.5   * 107   CO2 29 28   BUN 43* 53*   CREATININE 1.32* 1.61*     Hepatic Function Panel:   Recent Labs     12/06/20  0622 12/07/20  0603   PROT 7.9 7.7   LABALBU 1.9* 2.0*   BILIDIR 0.27 0.33*   IBILI 0.20 0.23   BILITOT 0.47 0.56   ALKPHOS 93 84   ALT 36 36   AST 41* 43*     No results for input(s): RPR in the last 72 hours.   No results for input(s): HIV in the last 72 hours. No results for input(s): BC in the last 72 hours. Lab Results   Component Value Date    CREATININE 1.61 12/07/2020    GLUCOSE 155 12/07/2020       Detailed results: Thank you for allowing us to participate in the care of this patient. Please call with questions. This note is created with the assistance of a speech recognition program.  While intending to generate adocument that actually reflects the content of the visit, the document can still have some errors including those of syntax and sound a like substitutions which may escape proof reading. It such instances, actual meaningcan be extrapolated by contextual diversion. [address] [place] [address]  Office: [phone]  Perfect serve / office [phone]    I have discussed the care of the patient, including pertinent history and exam findings,  with the student. I have seen and examined the patient and the key elements of all parts of the encounter have been performed by me.   I agree with the assessment, plan and orders as documented by the student    Denice Christine, Infectious Diseases

## 2020-12-08 NOTE — FLOWSHEET NOTE
12/07/20 2338   Provider Notification   Reason for Communication Evaluate   Provider Name Bud Husbands   Provider Notification Resident   Method of Communication Call   Response No new orders   Notification Time [phone]     Notified SpO2 79-85 % since turning and repositioning over 20 minutes ago, no improvement with suctioning. Already on 100% FiO2. Has increased to 85-87% over last several minutes. Continue to monitor and he will come and check on him.

## 2020-12-09 NOTE — PROGRESS NOTES
[facility] -   Infectious diseases evaluation  admission date 12/2/2020    reason for consultation:   LLE cellulitis    Impression :   Current:  · LLE Cellulitis resolved  · abd wall cellulitis resolved  · Acute hypoxic hypercapnic respiratory failure  · Mild Leucocytosis  · Persistent fever -   · crp elevation  · pulm mucous plugs, removed by bronch -BAL cx neg  · bacteremia x 2 2/17   · Multifocal pneumonia vs inters pneumonia   · CRISS 12/9    ·   Discussion / summary of stay / plan of care   ·   Recommendations   Stopped zosyn and vanco since he fails to respond  12/9 switched to ceftaroline pend BC final ID  Suspect the fever is from the lungs, 12/9 CXR suggesting a multifocal inters pneumonia for the first time. · Look for histoplasma  · Get mycoplasma  · Get legionella  · Add levaquin iv pend above  · Follow blood cultures  · Blood cx 12/7: 2/2 sets positive for staph - pending ID to R/o double contamination  · Blood cx 12/6 no growth  · CRP repeat  · Consider CT chest and abd if can fit the mashine  · Ask for LP by IR and start acyclovir 10 mg per kg q 8 hrs  · Start itraconazole 200 mg q 8 and get a itracon level on 12/12    Infection Control Recommendations   · [place] Precautions    Antimicrobial Stewardship Recommendations   · Simplification of therapy  · Targeted therapy  Coordination ofOutpatient Care:   · Estimated Length of IV antimicrobials:  · Patient will need Midline / picc Catheter Insertion:   · Patient will need SNF:  · Patient will need outpatient wound care:     History of Present Illness:   Initial history:  Mikayla Carter is a 37y.o.-year-old male with hx of ERA on BiPAP with morbid obesity presented to [facility] with complaint of pain and swelling to LLE on 11/27/2020. Per mother, patient had similar symptoms 2-3 months ago and was seen at [facility] and was discharged on antibiotics.  Patient completed course at Patient ventilated and sedated. FiO2 100, Peep 20   LLE and abdominal cellulitis resolved  Continues to have fevers - Tmax 102.4  Fever continues and all cx are non significant    CRP elevated 398  WBC 9.9, not correlating w the fever    Blood cultures 12/7 x2  positive for staphylococcus - pending ID to r/o double contamination  Lab to ID both org    Summary of relevant labs:  Labs:  WBC 13-12-11-12 -11.5 - 13.4 - 9.9  Cr 1.3 -  1.61 - 1.98 - 2.14  Lactic 1.2 - 1.6        Micro:  Blood cx 12/7 Staphylococcus detected in 2/2 cultures  Blood cx 12/2 & 12/6 no growth  Sputum cx 12/6 neg  COVID neg  Hepatitis panel negative    Imaging:  CXR 12/9: Persistent bilateral pulmonary opacities could represent pulmonary edema or pneumonia        Liver US 12/9: Unremarkable right upper quadrant ultrasound. Echo 12/8: EF 45%. Leftward compression of   inter-ventricular septum (\"D-sign\") indicating RV pressure overload. Right atrium is severely dilated . Right ventricle is severely dilated with reduced systolic function. CXR 12/7: Worsening pulmonary edema and increased right pleural effusion. Underlying pneumonia is not excluded. I have personally reviewed the past medical history, past surgical history, medications, social history, and family history, and I haveupdated the database accordingly. Allergies:   Patient has no known allergies. Review of Systems:     Review of Systems   Unable to perform ROS: Intubated       Physical Examination :       Physical Exam  Constitutional:       General: He is not in acute distress. Appearance: He is obese. He is not ill-appearing. HENT:      Head: Normocephalic. Nose: Nose normal.      Mouth/Throat:      Comments: Orally intubated  Cardiovascular:      Rate and Rhythm: Normal rate and regular rhythm. Pulses: Normal pulses. Heart sounds: Normal heart sounds. Pulmonary:      Effort: Tachypnea present.       Breath sounds: Wheezing present. Abdominal:      General: Bowel sounds are normal.      Palpations: Abdomen is soft. Genitourinary:     Comments: Johnson in, Sarahi urine  Skin:     General: Skin is warm and dry. Findings: No rash. Neurological:      Comments: Calm on vent         Past Medical History:   No past medical history on file. Past Surgical  History:   No past surgical history on file.     Medications:      vancomycin (VANCOCIN) intermittent dosing (placeholder)   Other RX Placeholder    midodrine  10 mg Oral 4x Daily    piperacillin-tazobactam  3.375 g Intravenous Q8H    lidocaine PF  5 mL Nebulization Once    lansoprazole  30 mg Per NG tube QAM AC    sodium chloride flush  10 mL Intravenous 2 times per day       Social History:     Social History     Socioeconomic History    Marital status: Single     Spouse name: Not on file    Number of children: Not on file    Years of education: Not on file    Highest education level: Not on file   Occupational History    Not on file   Social Needs    Financial resource strain: Not on file    Food insecurity     Worry: Not on file     Inability: Not on file    Transportation needs     Medical: Not on file     Non-medical: Not on file   Tobacco Use    Smoking status: Not on file   Substance and Sexual Activity    Alcohol use: Not on file    Drug use: Not on file    Sexual activity: Not on file   Lifestyle    Physical activity     Days per week: Not on file     Minutes per session: Not on file    Stress: Not on file   Relationships    Social connections     Talks on phone: Not on file     Gets together: Not on file     Attends Gnosticism service: Not on file     Active member of club or organization: Not on file     Attends meetings of clubs or organizations: Not on file     Relationship status: Not on file    Intimate partner violence     Fear of current or ex partner: Not on file     Emotionally abused: Not on file     Physically abused: Not on file     Forced

## 2020-12-09 NOTE — PROGRESS NOTES
Physician Progress Note      Kirill Wallace  CSN #:                  [id number]  :                       1985  ADMIT DATE:       2020 4:33 AM  DISCH DATE:  RESPONDING  PROVIDER #:        Ventura Pepper MD          QUERY TEXT:    Pt admitted with cellulitis left leg . Pt noted to have wbc 11.5, 13.1 resp   rate 25 with acute respiratory failure, heart rate 90's and worsening cr . If   possible, please document in the progress notes and discharge summary if you   are evaluating and /or treating any of the following: The medical record reflects the following:  Risk Factors: developed resp failure at osh with worsening cellulitis, morbid   obesity  Clinical Indicators: wbc 13.1 on ., heart rate 90's, cr with creatine   increasing to 1.55. acute respiratory failure with resp rate of 25  Treatment:iv antibiotics    Thank you Shanon Morocho RN CCDS BSN. . call if questions [phone]  Options provided:  -- Sepsis, present on admission  -- Sepsis, present on admission, now resolved,  -- No Sepsis, left leg  cellulitis) only  -- Sepsis was ruled out  -- Other - I will add my own diagnosis  -- Disagree - Not applicable / Not valid  -- Disagree - Clinically unable to determine / Unknown  -- Refer to Clinical Documentation Reviewer    PROVIDER RESPONSE TEXT:    This patient has sepsis which was present on admission. Query created by:  Louis Khan on 2020 2:34 PM      Electronically signed by:  Ventura Pepper MD 2020 4:01 PM

## 2020-12-09 NOTE — CARE COORDINATION
Carlitos Augusta, sister called [facility] and gave [facility] as LTACH choice and [facility] as first choice SNF. Went to unit and gave David Vallejo the SNF choice list for a couple more referrals, she will discuss with family and call [facility] tomorrow.

## 2020-12-09 NOTE — PROGRESS NOTES
Pharmacy Note  Vancomycin Consult    Gerda Ibarra is a 37 y.o. male started on Vancomycin for bacteremia; consult received from Dr. Pablo Sun to manage therapy. Also receiving the following antibiotics: Zosyn. Patient Active Problem List   Diagnosis    Acute respiratory failure with hypoxia and hypercapnia (HCC)    Cellulitis of left leg    ERA (obstructive sleep apnea)    Morbid obesity (HCC)    ELIECER (acute kidney injury) ([facility] [address]    Hypernatremia       Allergies:  Patient has no known allergies. Temp max: 38.5C    Recent Labs     12/08/20  0655 12/09/20  0449   BUN 63* 74*       Recent Labs     12/08/20  0655 12/09/20  0449   CREATININE 1.98* 2.14*       Recent Labs     12/08/20  1850 12/09/20  0449   WBC 13.4* 9.9         Intake/Output Summary (Last 24 hours) at 12/9/2020 0740  Last data filed at 12/9/2020 0600  Gross per 24 hour   Intake 3418 ml   Output 2225 ml   Net 1193 ml       Culture Date      Source                       Results  12.02                   Resp Panel                Not detected  12.02                   MRSA Swab              Negative  12.02                   Blood x2                     No growth  12.06                   Blood x1                     Pending  12.06                   Resp Culture              Normal resp georgina  12.07                   Blood x2                     [facility] - pending    Ht Readings from Last 1 Encounters:   12/02/20 5' 9\" (1.753 m)        Wt Readings from Last 1 Encounters:   12/08/20 (!) 608 lb 14.6 oz (276.2 kg)         Body mass index is 89.92 kg/m². Estimated Creatinine Clearance: 106 mL/min (A) (based on SCr of 2.14 mg/dL (H)). Goal Trough Level: 15-20 mcg/mL    Assessment/Plan:  Patient previously on vancomycin from OSH with persistently elevated levels. Will initiate Vancomycin conservatively with a one time dose of 1500 mg x1 (~5.5 mg/kg).   Timing of trough level will be determined based on culture results, renal function, and clinical response. Thank you for the consult. Will continue to follow.   Dano Andre, PharmD BCPS [facility]  12/9/2020 7:40 AM

## 2020-12-09 NOTE — PLAN OF CARE
Problem: Skin Integrity:  Goal: Will show no infection signs and symptoms  Description: Will show no infection signs and symptoms  Outcome: Ongoing     Problem: Skin Integrity:  Goal: Absence of new skin breakdown  Description: Absence of new skin breakdown  Outcome: Ongoing     Problem: OXYGENATION/RESPIRATORY FUNCTION  Goal: Patient will maintain patent airway  Outcome: Ongoing     Problem: OXYGENATION/RESPIRATORY FUNCTION  Goal: Patient will achieve/maintain normal respiratory rate/effort  Description: Respiratory rate and effort will be within normal limits for the patient  Outcome: Ongoing     Problem: MECHANICAL VENTILATION  Goal: Patient will maintain patent airway  Outcome: Ongoing     Problem: MECHANICAL VENTILATION  Goal: Oral health is maintained or improved  Outcome: Ongoing     Problem: MECHANICAL VENTILATION  Goal: ET tube will be managed safely  Outcome: Ongoing     Problem: MECHANICAL VENTILATION  Goal: Ability to express needs and understand communication  Outcome: Ongoing     Problem: MECHANICAL VENTILATION  Goal: Mobility/activity is maintained at optimum level for patient  Outcome: Ongoing     Problem: Aspiration:  Goal: Absence of aspiration  Description: Absence of aspiration  Outcome: Ongoing   BRONCHOSPASM/BRONCHOCONSTRICTION     [x]         IMPROVE AERATION/BREATH SOUNDS  [x]   ADMINISTER BRONCHODILATOR THERAPY AS APPROPRIATE  [x]   ASSESS BREATH SOUNDS  [x]   IMPLEMENT AEROSOL/MDI PROTOCOL  [x]   PATIENT EDUCATION AS NEEDED   Ventilator Bronchodilator assessment    Breath sounds: clear  Inspiratory Pressure: 42  Plateau Pressure: 38    Patient assessed at level 1          [x]    Bronchodilator Assessment    BRONCHODILATOR ASSESSMENT SCORE  Score 0 (Home) 1 2 3 4   Breath Sounds   []  Chronic Ventilator: Patient at baseline [x]  Mild Wheezes/ Clear []  Intermittent wheezes with good air entry []  Bilateral/unilateral wheezing with diminished air entry []  Insp/Exp wheeze and/or poor aeration   Ventilator Pressures   []  Chronic Ventilator []  Insp. Pressure less than 25 cm H20 []  Insp. Pressure less than 25 cm H20 []  Insp. Pressure exceeds 25 cm H20 [x]  Insp.  Pressure exceeds 30 cm H20   Plateau Pressure []  NA   [x]  Plateau Pressure less than 4  []  Plateau Pressure less than or equal to 5 []  Plateau Pressure greater than or equal to 6 []  Plateau Pressure greater than or equal to [address]  7:19 AM

## 2020-12-09 NOTE — PROGRESS NOTES
12/09/20 1734   Vent Information   FiO2  100 %  (pt desaturating, suctioned and increase FIO2 to 100%)

## 2020-12-09 NOTE — PROGRESS NOTES
Comprehensive Nutrition Assessment    Type and Reason for Visit:  Reassess    Nutrition Recommendations/Plan: Modify Tube Feeding: d/t propofol now at a lower rate, suggest increase TF rate to 45 mL/hr and decrease protein modulars to twice daily. Will continue to monitor TF tolerance/adequacy. Continue to modify TF as needed. Nutrition Assessment:  Pt remains on vent. Propofol currently at 27.7 mL/nr=241 kcal/day. Low Yemi, High Pro TF currently at 30 mL/hr and tolerating well per RN. Pt is also receiving 3 protein modulars per day (proteinex 2 go) + 400 mL H2O TID. Meds/labs reviewed. Malnutrition Assessment:  Malnutrition Status: At risk for malnutrition    Context:  Acute Illness     Findings of the 6 clinical characteristics of malnutrition:  Energy Intake:  Unable to assess intake PTA;currently meeting needs with nutrition support   Weight Loss:  No significant weight loss since admission     Body Fat Loss:  No significant body fat loss     Muscle Mass Loss:  No significant muscle mass loss    Fluid Accumulation:  7 - Moderate to Severe Extremities, Generalized   Strength:  Not Performed    Estimated Daily Nutrient Needs:  Energy (kcal):  7110-6321 kcal/day; Weight Used for Energy Requirements:  Ideal     Protein (g):  146-183 g protein/d (2.0-2.5 g/kg IBW);  Weight Used for Protein Requirements:  Ideal(73 kg)          Wounds:  Cellulitis       Current Nutrition Therapies:    Current Tube Feeding (TF) Orders:  · Feeding Route: Nasogastric  · Formula: Low Calorie, High Protein  · Schedule: Continuous currently at 30 mL/hr   · Additives/Modulars: Protein(proteinex 2 go , TID)  · Water Flushes:  400 mL TID   · Current TF & Flush Orders Provides: TF at 30 mL/hr +protein modular TID =1032 kcal and 141 g pro/day (+additional kcals from propofol)  · Goal TF & Flush Orders Provides: TF at 45 mL/hr + protein modular BID will provide 1288 kcal and 147 g pro/day (+additional kcals from propofol)    Additional Calorie Sources:   Propofol at 27.7 mL/hr =731 kcal/day    Anthropometric Measures:  · Height: 5' 9\" (175.3 cm)  · Current Body Weight: 608 lb 14.6 oz (276.2 kg)   · Admission Body Weight: 602 lb (273.1 kg)(12/7/20 first bed scale wt)    · Ideal Body Weight: 160 lbs; % Ideal Body Weight  380%   · BMI: 89.9  · BMI Categories: Obese Class 3 (BMI 40.0 or greater)       Nutrition Diagnosis:   · Inadequate oral intake related to impaired respiratory function as evidenced by NPO or clear liquid status due to medical condition, nutrition support - enteral nutrition    Nutrition Interventions:   Food and/or Nutrient Delivery:  Modify Tube Feeding: d/t propofol now at a lower rate, suggest increase TF rate to 45 mL/hr and decrease protein modulars to twice daily. Nutrition Education/Counseling:  No recommendation at this time   Coordination of Nutrition Care:  Continue to monitor while inpatient    Goals:  EN intake to meet % of estimated nutrition needs-goal achieved        Nutrition Monitoring and Evaluation:   Behavioral-Environmental Outcomes:  None Identified   Food/Nutrient Intake Outcomes:  Enteral Nutrition Intake/Tolerance  Physical Signs/Symptoms Outcomes:  Biochemical Data, Nutrition Focused Physical Findings, Weight, Skin, Fluid Status or Edema     Discharge Planning:     Too soon to determine     Electronically signed by Ulises Pascual RD, LD on 12/9/20 at 3:42 PM EST    Contact: [phone]

## 2020-12-09 NOTE — CARE COORDINATION
Transitional planning:      Received call from Aram Jiménez, [address] [facility], is accepting this pt.

## 2020-12-09 NOTE — PROGRESS NOTES
INTENSIVE CARE UNIT  Resident Physician Progress Note    Patient - Heena Dickey  Date of Admission -  2020  4:33 AM  Date of Evaluation -  2020  Room and Bed Number -  0105/0105-01   Hospital Day - 7      SUBJECTIVE:     OVERNIGHT EVENTS:      Patient is intubated and sedated with PRVC 20/530/20/90, sedated with propofol 17.5. Patient had T-max of 102.4 overnight and blood culture positive for gram-positive cocci in clusters coagulase-negative methicillin-resistant species. Patient creatinine went up to 2.4. Started on pharmacy to dose vancomycin along with Zosyn. Patient CRP is 398.4. Lactic acid 1.5. Due to high suspicion of pulmonary embolism as patient cannot get the CT PE/VQ scan due to body habitus echocardiogram was done which showed decreased LV systolic function with a EF of 45%, D sign, RA dilated, RV dilated with decreased systolic function. Patient has been started on high-dose heparin. Increased AST and ALT, 165 respectively. Hypernatremia resolved.   Continue spontaneous breathing and awakening trial.          TODAY:      AWAKE & FOLLOWING COMMANDS:  [x] No   [] Yes    SECRETIONS Amount:  [] Small [] Moderate  [] Large  [x] None  Color:     [] White [] Colored  [] Bloody    SEDATION:  RAAS Score:  [x] Propofol gtt  [] Versed gtt  [] Ativan gtt   [] No Sedation    PARALYZED:  [x] No    [] Yes    VASOPRESSORS:  [x] No    [] Yes  [] Levophed [] Dopamine [] Vasopressin  [] Dobutamine [] Phenylephrine [] Epinephrine      OBJECTIVE:     VITAL SIGNS:  BP (!) 99/53   Pulse 92   Temp 101.3 °F (38.5 °C) (Oral)   Resp 24   Ht 5' 9\" (1.753 m)   Wt (!) 608 lb 14.6 oz (276.2 kg)   SpO2 94%   BMI 89.92 kg/m²   Tmax over 24 hours:  Temp (24hrs), Av.7 °F (38.2 °C), Min:99.3 °F (37.4 °C), Max:102.4 °F (39.1 °C)      Patient Vitals for the past 8 hrs:   BP Temp Temp src Pulse Resp SpO2   20 0712 -- -- -- 92 24 94 %   20 -- -- -- 92 24 93 %   20 -- -- NOT REPORTED 11.8 NOT REPORTED        Last 3 Blood Glucose:   Recent Labs     12/07/20  0603 12/08/20  0655 12/09/20  0449   GLUCOSE 155* 113* 124*        PT/INR:  No results found for: PROTIME, INR  PTT:    Lab Results   Component Value Date    APTT 53.0 12/09/2020       Comprehensive Metabolic Profile:   Recent Labs     12/07/20  0603 12/08/20  0655 12/09/20  0449   * 143 142   K 4.5 4.7 4.9    104 103   CO2 28 28 25   BUN 53* 63* 74*   CREATININE 1.61* 1.98* 2.14*   GLUCOSE 155* 113* 124*   CALCIUM 8.3* 8.3* 8.2*   PROT 7.7 7.9 7.6   LABALBU 2.0* 1.8* 1.5*   BILITOT 0.56 0.89 0.95   ALKPHOS 84 91 93   AST 43* 72* 100*   ALT 36 50* 65*      Magnesium: No results found for: MG  Phosphorus: No results found for: PHOS  Ionized Calcium: No results found for: CAION     Urinalysis:   Lab Results   Component Value Date    NITRU NEGATIVE 12/02/2020    COLORU DARK YELLOW 12/02/2020    PHUR 5.0 12/02/2020    WBCUA 5 TO 10 12/02/2020    RBCUA 0 TO 2 12/02/2020    MUCUS NOT REPORTED 12/02/2020    TRICHOMONAS NOT REPORTED 12/02/2020    YEAST NOT REPORTED 12/02/2020    BACTERIA NOT REPORTED 12/02/2020    SPECGRAV 1.021 12/02/2020    LEUKOCYTESUR SMALL 12/02/2020    UROBILINOGEN Normal 12/02/2020    BILIRUBINUR NEGATIVE 12/02/2020    GLUCOSEU NEGATIVE 12/02/2020    KETUA TRACE 12/02/2020    AMORPHOUS NOT REPORTED 12/02/2020       HgBA1c:  No results found for: LABA1C  TSH:  No results found for: TSH  Lactic Acid:   Lab Results   Component Value Date    LACTA NOT REPORTED 12/09/2020    LACTA NOT REPORTED 12/08/2020    LACTA NOT REPORTED 12/07/2020      Troponin: No results for input(s): TROPONINI in the last 72 hours.       ASSESSMENT:     Patient Active Problem List    Diagnosis Date Noted    [facility]) [facility] [address]     Hypernatremia     Cellulitis of left leg 12/02/2020    ERA (obstructive sleep apnea) 12/02/2020    Morbid obesity ([facility] [address] 12/02/2020    Acute respiratory failure with hypoxia and hypercapnia ([facility] [address] 12/01/2020          PLAN:     WEAN PER PROTOCOL:  [] No   [x] Yes  [] N/A    ICU PROPHYLAXIS:  Stress ulcer:  [x] PPI Agent  [] F3Rszun [] Sucralfate  [] Other:  VTE:   [x] Enoxaparin  [] Unfract. Heparin Subcut  [] EPC Cuffs    NUTRITION:  [] NPO [x] Tube Feeding      [] TPN  [] PO    HOME MEDS RECONCILED: [x] No  [] Yes    CONSULTATION NEEDED:  [x] No  [] Yes    FAMILY UPDATED:    [] No  [] Yes    TRANSFER OUT OF ICU:   [x] No  [] Yes      Neuro:  · Sedated with propofol, following commands when off sedation.     Resp:  · Acute hypoxic hypercapnic respiratory failure: Currently worsening and requiring more of oxygen and pressure support. Covid negative. · Bronchoscopy done showing no mucous plugs. · Unable to get CT chest due to body habitus. Strong suspicion for PE patient is started on high-dose heparin. · Continue spontaneous breathing and awakening trial.      CV:  · Regular rate, rhythm. Pulses present. BP stable  · Troponinemia, likely demand ischemia. Echocardiogram done which showed descent along with decreased right and left systolic function with EF of 45%. Due to increase suspicion for PE patient is started on high-dose heparin. · Echo at [facility] - EF 55-60%.     ID:  · Sepsis. 2 blood cultures positive for GPC, coagulase-negative methicillin-resistant species. Pharmacy to dose vancomycin. Follow-up on ID recommendations, need to change other antibiotic given her kidney dysfunction. Continue Zosyn. · LLE cellulitis improving. · Cultures remain negative     Heme:  · Leukocytosis secondary to acute cellulitis, improving. · CBC daily     GI/Nutrition:  · Continue tube feeds at 70 mL/hour  · Increased AST and ALT, follow-up on liver ultrasound and acute hepatic panel. Renal:  · ELIECER, worsening creatinine 2.4.  · Renal ultrasound unremarkable     Prophylaxis:  · DVT:  High-dose heparin  · GI: protonix 40 mg daily     Dispo:  Currently staying in ICU.       Macario Rodriguez MD  PGY-2, Internal medicine resident/critical care service  [facility] [address] [place], [place]      Attending Physician Statement  I have discussed the care of Shahana Amezcua, including pertinent history and exam findings with the resident. I have reviewed the key elements of all parts of the encounter with the resident. I have seen and examined the patient with the resident. I agree with the assessment and plan and status of the problem list as documented. I have seen the patient during the round today, labs reviewed and chest x-ray seen. Patient had a T-max of 102.9 overnight, his blood culture is growing gram-positive cocci in clusters 1 is staph epi other 1 is not known at this time. He was on vancomycin initially which was stopped by nephrology because of ELIECER. He received 1 dose of Lasix yesterday and his creatinine had increased to 2.14 today, his urine output is 2.2 L in last 24-hour he has mild elevation of liver enzyme and his BUN is increased to 74. He is on low-dose of Levophed at 2 mcg to maintain systolic blood pressure above 100. He is still on propofol he is arousable while he is off propofol. Ventilator setting PRVC/20/530/20/90 percent continue to require high FiO2, PEEP is 20, ABG 7.3 8/55/78/33. Lactic acid was 1.5. Endotracheal secretion is moderate and slightly thick. He was started on heparin drip yesterday empirically as he has severely hypoxic high risk for thromboembolism and VQ scan or CTA chest cannot be performed in his case currently. His echocardiogram yesterday shows day #suggestive of right ventricular volume overload right atrium and right ventricular dilation  Continue and will try to wean Levophed off if he require higher dose of Levophed then will discontinue propofol and start him on Versed. Continue with heparin drip. Continue with current ventilator setting.   We will start him on vancomycin and will need infectious disease to consider Teflaro and then DC vancomycin. He had liver ultrasound done which did not show any pathology. We will get chest x-ray tomorrow. Discussed with nursing staff, treatment and plan discussed. Discussed with respiratory therapist.    Total critical care time caring for this patient with life threatening, unstable organ failure, including direct patient contact, management of life support systems, review of data including imaging and labs, discussions with other team members and physicians at least 28  Min so far today, excluding procedures. Please note that this chart was generated using voice recognition Dragon dictation software. Although every effort was made to ensure the accuracy of this automated transcription, some errors in transcription may have occurred.      Zaina Watt MD  12/9/2020 10:22 AM

## 2020-12-10 NOTE — CARE COORDINATION
Spoke with patients daughter Xiomara Chauhan and she provided more SNF choices, [facility] first in [place], [facility] in [place], [facility] in [place] and [address] [place] in [place] is last choice.

## 2020-12-10 NOTE — CONSULTS
[address]      Patient's Name/ Date of Birth/ Gender: Twila Lovell / 1985 (37 y.o.) / male     Referring Physician: Wm Garcia MD    Consulting Physician: DR. Xiomara Padilla    History of present Illness: Pt is a 37 y.o. male, admitted to the ICU on 12/2/20 for sepsis secondary to cellulitis of LLE. He subsequently developed respiratory failure and was intubated. Over the last 48 hours the patient has had prolonged desaturations into the 60s and increased ventilatory support. The ICU team ordered an ECHO to evaluate for possible pulmonary embolism since he is too large for a CT PE or V/Q scan. The ECHO showed signs of RV strain, RA dilation, and (+) D sign. Currently on heparin drip for possible PE. Past Medical History:  has no past medical history on file. Past Surgical History: No past surgical history on file. Social History:  No history of tobacco, EtOH, or illicit drug use. Family History: family history is not on file. Allergies: Patient has no known allergies.     Current Meds:  Current Facility-Administered Medications:     midazolam (VERSED) 1 mg/mL in D5W infusion, 2 mg/hr, Intravenous, Continuous, Alecia Ramirez MD, Last Rate: 1 mL/hr at 12/10/20 1046, 1 mg/hr at 12/10/20 1046    fentaNYL 20 mcg/mL Infusion, 25 mcg/hr, Intravenous, Continuous, Alecia Ramierz MD, Last Rate: 1.3 mL/hr at 12/10/20 1046, 25 mcg/hr at 12/10/20 1046    norepinephrine (LEVOPHED) 16 mg in sodium chloride 0.9 % 250 mL infusion, 2 mcg/min, Intravenous, Continuous, Chan Gregorio MD, Last Rate: 6.6 mL/hr at 12/10/20 0941, 7 mcg/min at 12/10/20 0941    ceftaroline fosamil (TEFLARO) 600 mg in dextrose 5 % 50 mL IVPB, 600 mg, Intravenous, Q12H, Denice Forbes MD, Stopped at 12/10/20 0625    levoFLOXacin (LEVAQUIN) 500 MG/100ML infusion 500 mg, 500 mg, Intravenous, Q24H, Denice Ortiz MD, Stopped at 12/10/20 0050    itraconazole (SPORANOX) capsule 200 mg, 200 mg, Oral, TID, Denice Pina MD, 200 mg at 12/10/20 0900    acyclovir (ZOVIRAX) 700 mg in dextrose 5 % 250 mL IVPB, 10 mg/kg (Ideal), Intravenous, Q8H, Denice Pina MD, Stopped at 12/10/20 0625    midodrine (PROAMATINE) tablet 10 mg, 10 mg, Oral, 4x Daily, Linda South MD, 10 mg at 12/10/20 0900    heparin (porcine) injection 10,000 Units, 10,000 Units, Intravenous, PRN, Ventura Woodruff MD    heparin (porcine) injection 5,000 Units, 5,000 Units, Intravenous, PRN, Ventura Woodruff MD, 5,000 Units at 12/09/20 0531    heparin 25,000 units in dextrose 5% 250 mL infusion, 7.6 Units/kg/hr, Intravenous, Continuous, Howie Bran MD, Last Rate: 41.4 mL/hr at 12/10/20 1056, 15 Units/kg/hr at 12/10/20 1056    acetaminophen (TYLENOL) tablet 650 mg, 650 mg, Oral, Q4H PRN, 650 mg at 12/09/20 0132 **OR** acetaminophen (TYLENOL) suppository 650 mg, 650 mg, Rectal, Q4H PRN, Asia Thomas MD    lidocaine PF 1 % injection 5 mL, 5 mL, Nebulization, Once, Dean العراقي MD    lansoprazole suspension SUSP 30 mg, 30 mg, Per NG tube, QAM AC, Ruby Kimble MD, 30 mg at 12/10/20 0900    sodium chloride flush 0.9 % injection 10 mL, 10 mL, Intravenous, 2 times per day, Ruby Kimble MD, 10 mL at 12/09/20 2233    sodium chloride flush 0.9 % injection 10 mL, 10 mL, Intravenous, PRN, Ruby Kimble MD    polyethylene glycol [facility]) packet 17 g, 17 g, Oral, Daily PRN, Ruby Kimble MD    promethazine (PHENERGAN) tablet 12.5 mg, 12.5 mg, Oral, Q6H PRN **OR** ondansetron (ZOFRAN) injection 4 mg, 4 mg, Intravenous, Q6H PRN, Ruby Kimble MD    propofol injection, 10 mcg/kg/min, Intravenous, Titrated, Ruby Kimble MD, Last Rate: 31.7 mL/hr at 12/10/20 0849, 20 mcg/kg/min at 12/10/20 0849    REVIEW OF SYSTEMS:      Unable to perform ROS due to patient being intubated and mechanically ventilated.        PHYSICAL EXAM:    VITALS:  BP (!) 115/58   Pulse 96   Temp 99 °F (37.2 °C) (Oral)   Resp (!) 32   Ht 5' 9\" (1.753 m)   Wt (!) 610 lb 0.2 oz (276.7 kg)   SpO2 (!) 84%   BMI 90.08 kg/m²   INTAKE/OUTPUT:     Intake/Output Summary (Last 24 hours) at 12/10/2020 1058  Last data filed at 12/10/2020 1000  Gross per 24 hour   Intake 4679.24 ml   Output 2930 ml   Net 1749.24 ml         CONSTITUTIONAL:  Intubated and sedated   HEAD: normocephalic, atraumatic  ENT: Mucus membranes moist, No otorrhea, no rhinorrhea  NECK:  supple, symmetrical, trachea midline, large neck and hard to assess appropriately   LUNGS:  Good air movement bilaterally, no wheezes or rhonchi, intubated  CARDIOVASCULAR: Regular rate and rhythm, no murmurs rubs or gallops  ABDOMEN: soft, non tender, non distended, no rebound or guarding, no hernias, no hepatomegaly, no splenomegly  : Deferred  Rectal:  Deferred   SKIN: Rash to the LLE that is improving   NEUROLOGIC:  Sedated      Labs:   Lab Results   Component Value Date    WBC 12.5 12/10/2020    HGB 9.2 12/10/2020    HCT 31.8 12/10/2020    MCV 99.1 12/10/2020     12/10/2020     Lab Results   Component Value Date     12/10/2020    K 4.5 12/10/2020     12/10/2020    CO2 29 12/10/2020    BUN 81 12/10/2020    CREATININE 2.04 12/10/2020    GLUCOSE 118 12/10/2020    CALCIUM 8.3 12/10/2020     No results found for: INR    Imaging:    ECHO  Summary  Technically difficult study. Left ventricle is normal in size, increased septal wall thickness, global  left ventricular systolic function is mildly reduced. Estimated ejection fraction is 45%. Leftward compression of  inter-ventricular septum (\"D-sign\") indicating RV pressure overload. Left atrium is at upper limits of normal.  Right atrium is severely dilated . Right ventricle is severely dilated with reduced systolic function. No significant valvular abnormalities.     Impression:  Patient Active Problem List   Diagnosis    Acute respiratory failure with hypoxia and hypercapnia (HCC)    Cellulitis of left leg    ERA (obstructive sleep apnea)    Morbid obesity ([facility] [address]    ELIECER (acute kidney injury) ([facility] [address]    Hypernatremia    Arterial hypotension    Sepsis with acute hypercapnic respiratory failure and septic shock ([facility] [address]       1. 38 yo M with respiratory failure secondary to septic shock from cellulitis to the LLE with possible PE with evidence on ECHO showing R heart strain and (+) D sign. Recommendation:    1. Continue supportive care per primary ICU team  2. Continue heparin drip  3. No acute intervention at this time  4. Positive blood cultures with Staph. Epidermidis causing septic shock picture  5.  Most likely pulmonary hypertension at baseline secondary to morbid obesity      Jessica Davis  12/10/2020

## 2020-12-10 NOTE — FLOWSHEET NOTE
Severiano Sims, pt's mother called and updated on pt's respiratory status. Pt is being swabbed for COVID so no visitors at this time.

## 2020-12-10 NOTE — PROGRESS NOTES
12/10/20 0747   Vent Information   Vent Type Servo i   Vent Mode PRVC   FiO2  100 %   SpO2 (!) 64 %   critical care aware of low sp02 and vent settings    TATYANA MANNING, RRT

## 2020-12-10 NOTE — PLAN OF CARE
Problem: Skin Integrity:  Goal: Will show no infection signs and symptoms  Description: Will show no infection signs and symptoms  12/10/2020 0807 by Cecilia Porter RCP  Outcome: Ongoing  12/10/2020 0656 by Juan Potts RN  Outcome: Ongoing     Problem: OXYGENATION/RESPIRATORY FUNCTION  Goal: Patient will maintain patent airway  12/10/2020 0807 by HARSHAD ZarcoP  Outcome: Ongoing  12/10/2020 0656 by Juan Potts RN  Outcome: Ongoing     Problem: MECHANICAL VENTILATION  Goal: Patient will maintain patent airway  12/10/2020 0807 by Cecilia Porter P  Outcome: Ongoing  12/10/2020 0656 by Juan Potts RN  Outcome: Ongoing     Problem: MECHANICAL VENTILATION  Goal: Oral health is maintained or improved  12/10/2020 0807 by Cecilia Porter RCP  Outcome: Ongoing  12/10/2020 0656 by Juan Potts RN  Outcome: Ongoing     Problem: MECHANICAL VENTILATION  Goal: ET tube will be managed safely  12/10/2020 0807 by HARSHAD ZarcoP  Outcome: Ongoing  12/10/2020 0656 by Juan Potts RN  Outcome: Ongoing     Problem: MECHANICAL VENTILATION  Goal: Ability to express needs and understand communication  12/10/2020 0807 by Cecilia Porter RCP  Outcome: Ongoing  12/10/2020 0656 by Juan Potts RN  Outcome: Ongoing     Problem: MECHANICAL VENTILATION  Goal: Mobility/activity is maintained at optimum level for patient  12/10/2020 2873 by Cecilia Porter RCP  Outcome: Ongoing  12/10/2020 0656 by Juan Potts RN  Outcome: Ongoing     Problem: Airway Clearance - Ineffective:  Goal: Ability to maintain a clear airway will improve  Description: Ability to maintain a clear airway will improve  12/10/2020 0807 by Cecilia Porter RCP  Outcome: Ongoing  12/10/2020 0656 by Juan Potts RN  Outcome: Ongoing     Problem: Aspiration:  Goal: Absence of aspiration  Description: Absence of aspiration  12/10/2020 0807 by Cecilia Porter RCP  Outcome: Ongoing  12/10/2020 0656 by Lyla Simth RN  Outcome: Ongoing     Problem: Gas Exchange - Impaired:  Goal: Levels of oxygenation will improve  Description: Levels of oxygenation will improve  12/10/2020 0807 by Dwayne Garcia RCP  Outcome: Ongoing  12/10/2020 0656 by Brayden Rodriguez RN  Outcome: Ongoing     Problem: Skin Integrity - Impaired:  Goal: Will show no infection signs and symptoms  Description: Will show no infection signs and symptoms  12/10/2020 0807 by Dwayne Garcia RCP  Outcome: Ongoing  12/10/2020 0656 by Brayden Rodriguez RN  Outcome: Ongoing

## 2020-12-10 NOTE — PROGRESS NOTES
INTENSIVE CARE UNIT  Resident Physician Progress Note    Patient - Joel Shaver  Date of Admission -  2020  4:33 AM  Date of Evaluation -  12/10/2020  Room and Bed Number -  0105/0105-01   Hospital Day - 8      SUBJECTIVE:     OVERNIGHT EVENTS:      Patient seen and examined. No fevers overnight. Patient easily desaturates down to 60% being on higher vent settings of 28/530/20/100. CRP is going up for 74.2, creatinine trending down, WBC 12.5. ID has a started him on ceftaroline, acyclovir and itraconazole and consulted IR for LP. Off of vancomycin and Zosyn. Urine output 2.2 L in 24-hour  AST and ALT trending down. Right upper quadrant ultrasound and acute hepatic panel nonsignificant.       TODAY:      AWAKE & FOLLOWING COMMANDS:  [x] No   [] Yes    SECRETIONS Amount:  [] Small [] Moderate  [] Large  [x] None  Color:     [] White [] Colored  [] Bloody    SEDATION:  RAAS Score:  [x] Propofol gtt  [] Versed gtt  [] Ativan gtt   [] No Sedation    PARALYZED:  [x] No    [] Yes    VASOPRESSORS:  [x] No    [] Yes  [] Levophed [] Dopamine [] Vasopressin  [] Dobutamine [] Phenylephrine [] Epinephrine      OBJECTIVE:     VITAL SIGNS:  /63   Pulse 101   Temp 99 °F (37.2 °C)   Resp 27   Ht 5' 9\" (1.753 m)   Wt (!) 610 lb 0.2 oz (276.7 kg)   SpO2 (!) 79%   BMI 90.08 kg/m²   Tmax over 24 hours:  Temp (24hrs), Av.5 °F (37.5 °C), Min:98.8 °F (37.1 °C), Max:100.3 °F (37.9 °C)      Patient Vitals for the past 8 hrs:   BP Temp Temp src Pulse Resp SpO2 Weight   12/10/20 0800 -- 99 °F (37.2 °C) -- -- -- -- --   12/10/20 0751 -- -- -- 101 27 (!) 79 % --   12/10/20 0645 118/63 -- -- 107 29 (!) 83 % --   12/10/20 0641 -- -- -- 115 (!) 39 (!) 80 % --   12/10/20 0630 (!) 111/48 -- -- 109 (!) 32 (!) 78 % --   12/10/20 0615 (!) 109/54 -- -- 116 20 (!) 72 % (!) 610 lb 0.2 oz (276.7 kg)   12/10/20 0606 -- -- -- 115 (!) 32 -- --   12/10/20 0600 (!) 124/58 -- -- 110 23 (!) 81 % --   12/10/20 0545 (!) 93/47 -- -- 99 26 (!) 87 % --   12/10/20 0530 113/64 -- -- 100 (!) 31 (!) 85 % --   12/10/20 0515 119/76 -- -- 98 23 (!) 80 % --   12/10/20 0500 109/61 -- -- 97 30 (!) 88 % --   12/10/20 0445 (!) 102/50 -- -- 94 28 93 % --   12/10/20 0430 (!) 95/52 -- -- 96 29 96 % --   12/10/20 0415 -- -- -- 97 27 98 % --   12/10/20 0400 (!) 96/54 98.8 °F (37.1 °C) Oral 95 30 98 % --   12/10/20 0345 -- -- -- 94 (!) 31 100 % --   12/10/20 0330 (!) 106/59 -- -- 98 (!) 32 (!) 89 % --   12/10/20 0328 -- -- -- 100 30 (!) 89 % --   12/10/20 0315 -- -- -- 94 26 99 % --   12/10/20 0300 (!) 95/50 -- -- 93 29 97 % --   12/10/20 0245 -- -- -- 95 27 99 % --   12/10/20 0230 118/71 -- -- 97 30 94 % --   12/10/20 0215 -- -- -- 96 (!) 32 95 % --   12/10/20 0200 (!) 97/53 -- -- 96 27 99 % --   12/10/20 0145 -- -- -- 96 28 94 % --   12/10/20 0130 104/63 -- -- 98 30 97 % --   12/10/20 0115 -- -- -- 96 27 97 % --   12/10/20 0100 (!) 101/50 -- -- 96 26 94 % --         Intake/Output Summary (Last 24 hours) at 12/10/2020 0849  Last data filed at 12/10/2020 0800  Gross per 24 hour   Intake 4293.24 ml   Output 2665 ml   Net 1628.24 ml     Date 12/10/20 0000 - 12/10/20 2359   Shift 5413-5275 7587-5019 7692-3739 24 Hour Total   INTAKE   I.V.(mL/kg) 1168.7(4.2) 639(2.3)  1807.7(6.5)   Shift Total(mL/kg) 1168.7(4.2) 639(2.3)  1807.7(6.5)   OUTPUT   Urine(mL/kg/hr) 940(0.4) 400  1340   Shift Total(mL/kg) 940(3.4) 400(1.4)  1340(4.8)   Weight (kg) 276.7 276.7 276.7 276.7     Wt Readings from Last 3 Encounters:   12/10/20 (!) 610 lb 0.2 oz (276.7 kg)     Body mass index is 90.08 kg/m². PHYSICAL EXAM:  Constitutional: Intubated, sedated. Following commands off sedation.  Morbidly obese (BMI 86)  EENT: PERRLA, EOMI, sclera clear, anicteric, oropharynx clear  Neck: Large neck, supple, symmetrical, trachea midline, no adenopathy, thyroid symmetric  Respiratory: Intubated, equal air entry bilaterally, no rales, rhonchi  Cardiovascular: regular rate and rhythm, normal S1, S2, no murmur noted, pulses identified with Doppler   Abdomen: Cellulitis improving  Neurological: Sedated  Extremities: Left lower extremity swelling which is improving.     MEDICATIONS:  Scheduled Meds:   ceftaroline fosamil (TEFLARO) IVPB  600 mg Intravenous Q12H    levofloxacin  500 mg Intravenous Q24H    itraconazole  200 mg Oral TID    acyclovir  10 mg/kg (Ideal) Intravenous Q8H    midodrine  10 mg Oral 4x Daily    lidocaine PF  5 mL Nebulization Once    lansoprazole  30 mg Per NG tube QAM AC    sodium chloride flush  10 mL Intravenous 2 times per day     Continuous Infusions:   norepinephrine 9 mcg/min (12/10/20 0848)    heparin (PORCINE) Infusion 17 Units/kg/hr (12/10/20 2962)    propofol 20 mcg/kg/min (12/10/20 6793)     PRN Meds:   heparin (porcine), 10,000 Units, PRN  heparin (porcine), 5,000 Units, PRN  acetaminophen, 650 mg, Q4H PRN    Or  acetaminophen, 650 mg, Q4H PRN  sodium chloride flush, 10 mL, PRN  polyethylene glycol, 17 g, Daily PRN  promethazine, 12.5 mg, Q6H PRN    Or  ondansetron, 4 mg, Q6H PRN        SUPPORT DEVICES: [x] Ventilator [] BIPAP  [] Nasal Cannula [] Room Air    VENT SETTINGS (Comprehensive) (if applicable):    Vent Information  $Ventilation: $Subsequent Day  Skin Assessment: Clean, dry, & intact  Equipment ID: [id number] #[id number]  Equipment Changed: Expiratory Filter  Vent Type: Servo i  Vent Mode: PRVC  Vt Ordered: 530 mL  Rate Set: 28 bmp  Pressure Support: 10 cmH20  FiO2 : 100 %  SpO2: (!) 79 %  SpO2/FiO2 ratio: 79  Sensitivity: 5  PEEP/CPAP: 20  I Time/ I Time %: 0.8 s  Humidification Source: Heated wire  Humidification Temp: 37  Humidification Temp Measured: 30  Circuit Condensation: Drained  Nitric Oxide/Epoprostenol In Use?: No  Additional Respiratory  Assessments  Pulse: 101  Resp: 27  SpO2: (!) 79 %  $End Tidal CO2: 42  Position: Semi-Moncada's  Humidification Source: Heated wire  Humidification Temp: 37  Circuit Condensation: Drained  Oral Care Completed?: Yes  Oral Care: Mouth swabbed, Mouth suctioned  Subglottic Suction Done?: No  Cuff Pressure (cm H2O): (MLT)  Skin barrier applied: Yes    ABGs:   Lab Results   Component Value Date    FCD1FGL 35 12/10/2020    FIO2 100.0 12/10/2020         DATA:  Complete Blood Count:   Recent Labs     12/08/20  1850 12/09/20 0449 12/10/20  0449   WBC 13.4* 9.9 12.5*   RBC 3.45* 3.21* 3.21*   HGB 9.7* 9.4* 9.2*   HCT 34.1* 31.8* 31.8*   MCV 98.8 99.1 99.1   MCH 28.1 29.3 28.7   MCHC 28.4 29.6 28.9   RDW 15.7* 15.8* 15.5*    See Reflexed IPF Result 227   MPV 11.8 NOT REPORTED 12.8        Last 3 Blood Glucose:   Recent Labs     12/08/20  0655 12/09/20  0449 12/10/20  0449   GLUCOSE 113* 124* 118*        PT/INR:  No results found for: PROTIME, INR  PTT:    Lab Results   Component Value Date    APTT 95.8 12/10/2020       Comprehensive Metabolic Profile:   Recent Labs     12/08/20  0655 12/09/20  0449 12/10/20  0449    142 142   K 4.7 4.9 4.5    103 100   CO2 28 25 29   BUN 63* 74* 81*   CREATININE 1.98* 2.14* 2.04*   GLUCOSE 113* 124* 118*   CALCIUM 8.3* 8.2* 8.3*   PROT 7.9 7.6 7.9   LABALBU 1.8* 1.5* 1.7*   BILITOT 0.89 0.95 1.11   ALKPHOS 91 93 97   AST 72* 100* 73*   ALT 50* 65* 63*      Magnesium: No results found for: MG  Phosphorus: No results found for: PHOS  Ionized Calcium: No results found for: CAION     Urinalysis:   Lab Results   Component Value Date    NITRU NEGATIVE 12/02/2020    COLORU DARK YELLOW 12/02/2020    PHUR 5.0 12/02/2020    WBCUA 5 TO 10 12/02/2020    RBCUA 0 TO 2 12/02/2020    MUCUS NOT REPORTED 12/02/2020    TRICHOMONAS NOT REPORTED 12/02/2020    YEAST NOT REPORTED 12/02/2020    BACTERIA NOT REPORTED 12/02/2020    SPECGRAV 1.021 12/02/2020    LEUKOCYTESUR SMALL 12/02/2020    UROBILINOGEN Normal 12/02/2020    BILIRUBINUR NEGATIVE 12/02/2020    GLUCOSEU NEGATIVE 12/02/2020    KETUA TRACE 12/02/2020    AMORPHOUS NOT REPORTED 12/02/2020       HgBA1c:  No results found for: LABA1C  TSH:  No results found for: TSH  Lactic Acid:   Lab Results   Component Value Date    LACTA NOT REPORTED 12/09/2020    LACTA NOT REPORTED 12/08/2020    LACTA NOT REPORTED 12/07/2020      Troponin: No results for input(s): TROPONINI in the last 72 hours. ASSESSMENT:     Patient Active Problem List    Diagnosis Date Noted    Arterial hypotension     Sepsis with acute hypercapnic respiratory failure and septic shock (HCC)     [facility]     Hypernatremia     Cellulitis of left leg 12/02/2020    ERA (obstructive sleep apnea) 12/02/2020    Morbid obesity ([facility] 12/02/2020    Acute respiratory failure with hypoxia and hypercapnia [facility] 12/01/2020          PLAN:     WEAN PER PROTOCOL:  [] No   [x] Yes  [] N/A    ICU PROPHYLAXIS:  Stress ulcer:  [x] PPI Agent  [] [id number] [] Sucralfate  [] Other:  VTE:   [x] Enoxaparin  [] Unfract. Heparin Subcut  [] EPC Cuffs    NUTRITION:  [] NPO [x] Tube Feeding      [] TPN  [] PO    HOME MEDS RECONCILED: [x] No  [] Yes    CONSULTATION NEEDED:  [x] No  [] Yes    FAMILY UPDATED:    [] No  [] Yes    TRANSFER OUT OF ICU:   [x] No  [] Yes      Neuro:  · Sedated with propofol, following commands when off sedation.     Resp:  · Acute hypoxic hypercapnic respiratory failure: Currently worsening and requiring more of oxygen and pressure support. Covid negative. · Bronchoscopy done showing no mucous plugs. · Unable to get CT chest due to body habitus. Strong suspicion for PE patient is started on high-dose heparin. · Continue spontaneous breathing and awakening trial.      CV:  · Regular rate, rhythm. Pulses present. BP stable  · Troponinemia, likely demand ischemia. Echocardiogram done which showed descent along with decreased right and left systolic function with EF of 45%. Due to increase suspicion for PE patient is started on high-dose heparin. · [place] [facility] [place]60%.     ID:  · Sepsis. 2 blood cultures positive for GPC, coagulase-negative methicillin-resistant species. ID following, patient is started on ceftaroline, acyclovir and metronidazole, patient will be going for IR guided LP. LLE cellulitis improving. · Cultures x2+ for methicillin-resistant especially. Heme:  · Leukocytosis secondary to acute cellulitis, improving. · CBC daily     GI/Nutrition:  · Continue tube feeds at 70 mL/hour  · AST ALT trending down, right upper quadrant ultrasound nonsignificant. Renal:  · ELIECER, creatinine trending down 2.04.  · Renal ultrasound unremarkable     Prophylaxis:  · DVT:  High-dose heparin  · GI: protonix 40 mg daily     Dispo:  Currently staying in ICU. Montez Chu MD  PGY-2, Internal medicine resident/critical care service  [address] [place] [address]      Attending Physician Statement  I have discussed the care of Mel Mock, including pertinent history and exam findings with the resident. I have reviewed the key elements of all parts of the encounter with the resident. I have seen and examined the patient with the resident. I agree with the assessment and plan and status of the problem list as documented. I have seen the patient during my round today, I reviewed the chart, lab seen, arterial blood gases and ventilator support seen. He is continue to be hypotensive requiring low-dose of Levophed at 6 mcg this morning. He continues to be severely hypoxic despite maximum therapy with high PEEP and high FiO2 continue to have high peak pressures. He had received 80 mg Lasix yesterday his urine output was 2265 and creatinine is 2.04. His BUN is 81 today. Chest x-ray was ordered and reviewed shows bilateral lower lobe pulmonary infiltrate. Apparently he has outside Covid test which was negative and Covid test was repeated again rapid which is negative Covid PCR is repeated again which is pending.   Ventilator setting PRVC/28/530/20/100 percent and ABG 7.4 0/53/59/33 he is been hypoxic and he goes down to saturation of upper 70s occasionally when he has increased respiratory rate or cough and will start him on Versed and start him on fentanyl drip need to increase the patient ventilator asynchrony to control high airway pressure and he may need paralytic to facilitate oxygen saturation, oxygen delivery and control airway pressure. He was started on itraconazole and acyclovir by infectious disease and he is currently on ceftaroline. His CRP has been increasing has been followed by infectious disease. As he is echocardiogram shows right ventricular strain RV and RA dilatation and D-shaped side he is empirically on heparin drip for suspected/pulmonary bolusing because of the increased risk. His venous scan which was done on 12/08/2020 was negative. Vascular surgery is consulted this morning to determine any intervention although it will be less likely to do any intervention with ELIECER with presumed pulmonary embolism severe morbid obesity with current overall status. His overall prognosis remain guarded  Discussed with nursing staff, treatment and plan discussed. Discussed with respiratory therapist.    Total critical care time caring for this patient with life threatening, unstable organ failure, including direct patient contact, management of life support systems, review of data including imaging and labs, discussions with other team members and physicians at least 39  Min so far today, excluding procedures. Please note that this chart was generated using voice recognition Dragon dictation software. Although every effort was made to ensure the accuracy of this automated transcription, some errors in transcription may have occurred.      Leidy Wilhelm MD  12/10/2020 10:09 AM

## 2020-12-10 NOTE — PROGRESS NOTES
Renal Progress Note    Patient :  Kain Hewitt; 37 y.o. MRN# [id number]  Location:  0105/0105-01  Attending:  Victor Manuel Hadley MD  Admit Date:  12/2/2020   Hospital Day: 8    Subjective:     Patient  examined at bedside. Chart reviewed. His blood cultures-2/2 positive for DELMER Crowe has been switched to ceftaroline   now requiring pressor support with norepinephrine 8 mcg  Left lower extremity appears to be swollen and slightly erythematous with erythema extending up to the pannus. He is on a heparin drip as his DVT scans have not resulted as yet. His vent settings continue to be high FiO2 100% with propofol for sedation. His vent settings are PRVC/20/530/20. Tube feeds continue at goal rate. Also on free water 400 mL 3 times daily. Urine output in the last 24 hours has been 2.6 L. Insensible losses high exceed 1.5 L a day  Creatinine 2.0, slight improvement hypernatremia resolved. Insensible losses continue to be high. Johnson catheter in place. Unresponsive, sedated. Outpatient Medications:     No medications prior to admission. Current Medications:     Scheduled Meds:    ceftaroline fosamil (TEFLARO) IVPB  600 mg Intravenous Q12H    levofloxacin  500 mg Intravenous Q24H    itraconazole  200 mg Oral TID    acyclovir  10 mg/kg (Ideal) Intravenous Q8H    midodrine  10 mg Oral 4x Daily    lidocaine PF  5 mL Nebulization Once    lansoprazole  30 mg Per NG tube QAM AC    sodium chloride flush  10 mL Intravenous 2 times per day     Continuous Infusions:    norepinephrine 8 mcg/min (12/10/20 0916)    heparin (PORCINE) Infusion 17 Units/kg/hr (12/10/20 5243)    propofol 20 mcg/kg/min (12/10/20 0849)     PRN Meds:  heparin (porcine), heparin (porcine), acetaminophen **OR** acetaminophen, sodium chloride flush, polyethylene glycol, promethazine **OR** ondansetron    Input/Output:       I/O last 3 completed shifts:   In: 3654.2 [I.V.:2274.2; NG/GT:1380]  Out: 2871 [Urine:2265]    Vital Signs: Temperature:  Temp: 99 °F (37.2 °C)  TMax:   Temp (24hrs), Av.5 °F (37.5 °C), Min:98.8 °F (37.1 °C), Max:100.3 °F (37.9 °C)    Respirations:  Resp: 27  Pulse:   Pulse: 101  BP:    BP: 118/63  BP Range: Systolic (54ZJW), VB , Min:90 , ACK:549       Diastolic (01QQG), WII:15, Min:47, Max:77      Physical Examination:     General:  Sedated on ventilator. FiO2 100 % with a PEEP of 10  HEENT:  Atraumatic, normocephalic, no throat congestion, moist mucosa. Eyes:   Pupils equal, round and reactive to light, pallor, no icterus. Neck:   Supple. Chest:  Air entry fair bilaterally. Cardiac: S1 S2 RR no murmurs  Abdomen:  Soft, non-tender, no masses or organomegally appreciable, BS sluggish. :   No suprapubic or flank tenderness. Neuro:  Sedated on ventilator. Skin:   No rashes, good skin turgor. Extremities:  2+ edema.     Labs:       Recent Labs     20  1850 12/09/20  0449 12/10/20  0449   WBC 13.4* 9.9 12.5*   RBC 3.45* 3.21* 3.21*   HGB 9.7* 9.4* 9.2*   HCT 34.1* 31.8* 31.8*   MCV 98.8 99.1 99.1   MCH 28.1 29.3 28.7   MCHC 28.4 29.6 28.9   RDW 15.7* 15.8* 15.5*    See Reflexed IPF Result 227   MPV 11.8 NOT REPORTED 12.8      BMP:   Recent Labs     20  0655 12/09/20  0449 12/10/20  0449    142 142   K 4.7 4.9 4.5    103 100   CO2 28 25 29   BUN 63* 74* 81*   CREATININE 1.98* 2.14* 2.04*   GLUCOSE 113* 124* 118*   CALCIUM 8.3* 8.2* 8.3*      Albumin:    Recent Labs     20  0655 12/09/20  0449 12/10/20  0449   LABALBU 1.8* 1.5* 1.7*     SPEP:  Lab Results   Component Value Date    PROT 7.9 12/10/2020     Urinalysis/Chemistries:      Lab Results   Component Value Date    NITRU NEGATIVE 2020    COLORU DARK YELLOW 2020    PHUR 5.0 2020    WBCUA 5 TO 10 2020    RBCUA 0 TO 2 2020    MUCUS NOT REPORTED 2020    TRICHOMONAS NOT REPORTED 2020    YEAST NOT REPORTED 2020    BACTERIA NOT REPORTED 2020    SPECGRAV 1.021 12/02/2020    LEUKOCYTESUR SMALL 12/02/2020    UROBILINOGEN Normal 12/02/2020    BILIRUBINUR NEGATIVE 12/02/2020    GLUCOSEU NEGATIVE 12/02/2020    KETUA TRACE 12/02/2020    AMORPHOUS NOT REPORTED 12/02/2020     Urine Sodium:     Lab Results   Component Value Date    MARCELINO 20 12/03/2020     Urine Osmolarity:   Lab Results   Component Value Date    OSMOU 408 12/03/2020     Urine Creatinine:     Lab Results   Component Value Date    LABCREA 126.7 12/03/2020     Radiology:     Reviewed     Assessment:     1. Acute Kidney Injury: Secondary to ischemic ATN from intravascular volume depletion from insensible losses, systemic inflammatory response syndrome, hypotension requiring pressor support, low flow. Baseline not known, creatinine presentation 1.3, peaked up to 2.1 now down to 2.0  2. MRSA bacteremia with septic shock on ceftaroline   3. Persistent respiratory acidosis and hypoxia  4. Respiratory failure on the ventilator, continues to require high FiO2  5. Hypovolemic hypernatremia, improved. 6.  Hypotension requiring pressor support. 7.  Morbid obesity  8. Left lower extremity cellulitis. .4    Plan:   1. Continue free water changed to every 8 and tube feeds. 2. Continue monitor strict I's and O's and renal function. 3. Will monitor off diuretics currently as he has good urine output. 4. Continue ProAmatine 10, 3 times a day. Wean off vasopressors as tolerated. 5. Antibiotics as per renal function. 6. Check BMP daily. 7. Prognosis guarded. 8. Will follow. Nutrition   Please ensure that patient is on a renal diet/TF. Avoid nephrotoxic drugs/contrast exposure. We will continue to follow along with you.

## 2020-12-10 NOTE — PLAN OF CARE
Problem: Restraint Use - Nonviolent/Non-Self-Destructive Behavior:  Goal: Absence of restraint indications  Description: Absence of restraint indications  12/10/2020 1122 by Connor Guan RN  Outcome: Not Met This Shift  12/10/2020 0656 by Tabby Patel RN  Outcome: Not Met This Shift  Goal: Absence of restraint-related injury  Description: Absence of restraint-related injury  12/10/2020 1122 by Connor Guan RN  Outcome: Met This Shift  12/10/2020 0656 by Tabby Patel RN  Outcome: Not Met This Shift     Problem: Skin Integrity:  Goal: Will show no infection signs and symptoms  Description: Will show no infection signs and symptoms  12/10/2020 1122 by Connor Guan RN  Outcome: Ongoing  12/10/2020 0807 by Derrick Ko RCP  Outcome: Ongoing  12/10/2020 0656 by Tabby Patel RN  Outcome: Ongoing  Goal: Absence of new skin breakdown  Description: Absence of new skin breakdown  12/10/2020 1122 by Connor Guan RN  Outcome: Ongoing  12/10/2020 0807 by Derrick Ko RCP  Outcome: Ongoing  12/10/2020 0656 by Tabby Patel RN  Outcome: Ongoing     Problem: Nutrition  Goal: Optimal nutrition therapy  Description: Nutrition Problem #1: Inadequate oral intake  Intervention: Food and/or Nutrient Delivery: Continue NPO, Start Tube Feeding  Nutritional Goals: EN intake to meet % of estimated nutrition needs     12/10/2020 1122 by Connor Guan RN  Outcome: Ongoing  12/10/2020 0656 by Tabby Patel RN  Outcome: Ongoing     Problem: OXYGENATION/RESPIRATORY FUNCTION  Goal: Patient will maintain patent airway  12/10/2020 1122 by Connor Guan RN  Outcome: Ongoing  12/10/2020 0807 by Derrick Ko RCP  Outcome: Ongoing  12/10/2020 0656 by Tabby Patel RN  Outcome: Ongoing  Goal: Patient will achieve/maintain normal respiratory rate/effort  Description: Respiratory rate and effort will be within normal limits for the patient  12/10/2020 1122 by Connor Guan RN  Outcome: Ongoing  12/10/2020 3313 by Lynda Cuff, RCP  Outcome: Ongoing  12/10/2020 0656 by Melissa Alvarenga RN  Outcome: Ongoing     Problem: MECHANICAL VENTILATION  Goal: Patient will maintain patent airway  12/10/2020 1122 by Kayli Medeiros RN  Outcome: Ongoing  12/10/2020 0807 by Lynda Cuff, RCP  Outcome: Ongoing  12/10/2020 0656 by Melissa Alvarenga RN  Outcome: Ongoing  Goal: Oral health is maintained or improved  12/10/2020 1122 by Kayli Medeiros RN  Outcome: Ongoing  12/10/2020 0807 by Lynda Cuff, RCP  Outcome: Ongoing  12/10/2020 0656 by Melissa Alvarenga RN  Outcome: Ongoing  Goal: ET tube will be managed safely  12/10/2020 1122 by Kayli Medeiros RN  Outcome: Ongoing  12/10/2020 0807 by Lynda Cuff, RCP  Outcome: Ongoing  12/10/2020 0656 by Melissa Alvarenga RN  Outcome: Ongoing  Goal: Ability to express needs and understand communication  12/10/2020 1122 by Kayli Medeiros RN  Outcome: Ongoing  12/10/2020 0807 by Lynda Cuff, RCP  Outcome: Ongoing  12/10/2020 0656 by Melissa Alvarenga RN  Outcome: Ongoing  Goal: Mobility/activity is maintained at optimum level for patient  12/10/2020 1122 by Kayli Medeiros RN  Outcome: Ongoing  12/10/2020 0807 by Lynda Cuff, HARSHADP  Outcome: Ongoing  12/10/2020 0656 by Melissa Alvarenga RN  Outcome: Ongoing     Problem: Confusion - Acute:  Goal: Absence of continued neurological deterioration signs and symptoms  Description: Absence of continued neurological deterioration signs and symptoms  12/10/2020 1122 by Kayli Medeiros RN  Outcome: Ongoing  12/10/2020 0656 by Melissa Alvarenga RN  Outcome: Ongoing  Goal: Mental status will be restored to baseline  Description: Mental status will be restored to baseline  12/10/2020 1122 by Kayli Medeiros RN  Outcome: Ongoing  12/10/2020 0656 by Melissa Alvarenga RN  Outcome: Ongoing     Problem: Discharge Planning:  Goal: Ability to perform activities of daily living will improve  Description: Ability to perform activities of daily living will improve  12/10/2020 1122 by Alessio Fox RN  Outcome: Ongoing  12/10/2020 0656 by Jojo Chan RN  Outcome: Ongoing  Goal: Participates in care planning  Description: Participates in care planning  12/10/2020 1122 by Alessio Fox RN  Outcome: Ongoing  12/10/2020 0656 by Jojo Chan RN  Outcome: Ongoing     Problem: Injury - Risk of, Physical Injury:  Goal: Absence of physical injury  Description: Absence of physical injury  12/10/2020 1122 by Alessio Fox RN  Outcome: Ongoing  12/10/2020 0656 by Jojo Chan RN  Outcome: Ongoing  Goal: Will remain free from falls  Description: Will remain free from falls  12/10/2020 1122 by Alessio Fox RN  Outcome: Ongoing  12/10/2020 0656 by Jojo Chan RN  Outcome: Ongoing     Problem: Mood - Altered:  Goal: Mood stable  Description: Mood stable  12/10/2020 1122 by Alessio Fox RN  Outcome: Ongoing  12/10/2020 0656 by Jojo Chan RN  Outcome: Ongoing  Goal: Absence of abusive behavior  Description: Absence of abusive behavior  12/10/2020 1122 by Alessio Fox RN  Outcome: Ongoing  12/10/2020 0656 by Jojo Chan RN  Outcome: Ongoing  Goal: Verbalizations of feeling emotionally comfortable while being cared for will increase  Description: Verbalizations of feeling emotionally comfortable while being cared for will increase  12/10/2020 1122 by Alessio Fox RN  Outcome: Ongoing  12/10/2020 0656 by Jojo Chan RN  Outcome: Ongoing     Problem: Psychomotor Activity - Altered:  Goal: Absence of psychomotor disturbance signs and symptoms  Description: Absence of psychomotor disturbance signs and symptoms  12/10/2020 1122 by Alessio Fox RN  Outcome: Ongoing  12/10/2020 0656 by Jojo Chan RN  Outcome: Ongoing     Problem: Sensory Perception - Impaired:  Goal: Demonstrations of improved sensory functioning will increase  Description: Demonstrations of improved sensory functioning will increase  12/10/2020 1122 by Alessio Fox RN  Outcome: Ongoing  12/10/2020 0656 by Jacob Bello RN  Outcome: Ongoing  Goal: Decrease in sensory misperception frequency  Description: Decrease in sensory misperception frequency  12/10/2020 1122 by Rodo Shafer RN  Outcome: Ongoing  12/10/2020 0656 by Jacob Bello RN  Outcome: Ongoing  Goal: Able to refrain from responding to false sensory perceptions  Description: Able to refrain from responding to false sensory perceptions  12/10/2020 1122 by Rodo Shafer RN  Outcome: Ongoing  12/10/2020 0656 by Jacob Bello RN  Outcome: Ongoing  Goal: Demonstrates accurate environmental perceptions  Description: Demonstrates accurate environmental perceptions  12/10/2020 1122 by Rodo Shafer RN  Outcome: Ongoing  12/10/2020 0656 by Jacob Bello RN  Outcome: Ongoing  Goal: Able to distinguish between reality-based and nonreality-based thinking  Description: Able to distinguish between reality-based and nonreality-based thinking  12/10/2020 1122 by Rodo Shafer RN  Outcome: Ongoing  12/10/2020 0656 by Jacob Bello RN  Outcome: Ongoing  Goal: Able to interrupt nonreality-based thinking  Description: Able to interrupt nonreality-based thinking  12/10/2020 1122 by Rodo Shafer RN  Outcome: Ongoing  12/10/2020 0656 by Jacob Bello RN  Outcome: Ongoing     Problem: Sleep Pattern Disturbance:  Goal: Appears well-rested  Description: Appears well-rested  12/10/2020 1122 by Rodo Shafer RN  Outcome: Ongoing  12/10/2020 0656 by Jacob Bello RN  Outcome: Ongoing     Problem: Airway Clearance - Ineffective:  Goal: Ability to maintain a clear airway will improve  Description: Ability to maintain a clear airway will improve  12/10/2020 1122 by Rodo Shafer RN  Outcome: Ongoing  12/10/2020 0807 by Keenan Villarreal RCP  Outcome: Ongoing  12/10/2020 0656 by Jacob Bello RN  Outcome: Ongoing     Problem: Anxiety/Stress:  Goal: Level of anxiety will decrease  Description: Level of anxiety will decrease  12/10/2020 1122 by Dl Singh RN  Outcome: Ongoing  12/10/2020 0656 by Elaina Lazo RN  Outcome: Ongoing     Problem: Aspiration:  Goal: Absence of aspiration  Description: Absence of aspiration  12/10/2020 1122 by Dl Singh RN  Outcome: Ongoing  12/10/2020 0807 by Olivia Jennings RCP  Outcome: Ongoing  12/10/2020 0656 by Elaina Lazo RN  Outcome: Ongoing     Problem: Fluid Volume - Imbalance:  Goal: Absence of imbalanced fluid volume signs and symptoms  Description: Absence of imbalanced fluid volume signs and symptoms  12/10/2020 1122 by Dl Singh RN  Outcome: Ongoing  12/10/2020 0656 by Elaina Lazo RN  Outcome: Ongoing     Problem: Cardiac Output - Decreased:  Goal: Hemodynamic stability will improve  Description: Hemodynamic stability will improve  12/10/2020 1122 by Dl Singh RN  Outcome: Ongoing  12/10/2020 0656 by Elaina Lazo RN  Outcome: Ongoing     Problem: Gas Exchange - Impaired:  Goal: Levels of oxygenation will improve  Description: Levels of oxygenation will improve  12/10/2020 1122 by Dl Singh RN  Outcome: Ongoing  12/10/2020 0807 by Olivia Jennings RCP  Outcome: Ongoing  12/10/2020 0656 by Elaina Lazo RN  Outcome: Ongoing     Problem: Nutrition Deficit:  Goal: Ability to achieve adequate nutritional intake will improve  Description: Ability to achieve adequate nutritional intake will improve  12/10/2020 1122 by Dl Singh RN  Outcome: Ongoing  12/10/2020 0656 by Elaina Lazo RN  Outcome: Ongoing     Problem: Pain:  Description: Pain management should include both nonpharmacologic and pharmacologic interventions.   Goal: Pain level will decrease  Description: Pain level will decrease  12/10/2020 1122 by Dl Singh RN  Outcome: Ongoing  12/10/2020 0656 by Elaina Lazo RN  Outcome: Ongoing  Goal: Recognizes and communicates pain  Description: Recognizes and communicates pain  12/10/2020 1122 by Dl Singh RN  Outcome: Ongoing  12/10/2020 0656 by Yoandy Rico RN  Outcome: Ongoing  Goal: Control of acute pain  Description: Control of acute pain  12/10/2020 1122 by Dl Singh RN  Outcome: Ongoing  12/10/2020 0656 by Daisy Smith RN  Outcome: Ongoing     Problem: Serum Glucose Level - Abnormal:  Goal: Ability to maintain appropriate glucose levels will improve to within specified parameters  Description: Ability to maintain appropriate glucose levels will improve to within specified parameters  12/10/2020 1122 by Dl Singh RN  Outcome: Ongoing  12/10/2020 0656 by Elaina Lazo RN  Outcome: Ongoing     Problem: Skin Integrity - Impaired:  Goal: Will show no infection signs and symptoms  Description: Will show no infection signs and symptoms  12/10/2020 1122 by Dl Singh RN  Outcome: Ongoing  12/10/2020 0807 by Olivia Jennings RCP  Outcome: Ongoing  12/10/2020 0656 by Elaina Lazo RN  Outcome: Ongoing  Goal: Absence of new skin breakdown  Description: Absence of new skin breakdown  12/10/2020 1122 by Dl Singh RN  Outcome: Ongoing  12/10/2020 0807 by Olivia Jennings RCP  Outcome: Ongoing  12/10/2020 0656 by Elaina Lazo RN  Outcome: Ongoing     Problem: Tissue Perfusion, Altered:  Goal: Circulatory function within specified parameters  Description: Circulatory function within specified parameters  12/10/2020 1122 by Dl Singh RN  Outcome: Ongoing  12/10/2020 0656 by Elaina Lazo RN  Outcome: Ongoing

## 2020-12-10 NOTE — CARE COORDINATION
Transitional planning. Pt remains intubated, [facility] following and [facility] accepted pt. SNF choices and referral information are in 12/10 CM note. Per RN, pt has PE and ID ordered Lumbar Puncture to be completed in IR (not completed yet).

## 2020-12-10 NOTE — PLAN OF CARE
Problem: Skin Integrity:  Goal: Will show no infection signs and symptoms  Description: Will show no infection signs and symptoms  Outcome: Ongoing     Problem: Skin Integrity:  Goal: Absence of new skin breakdown  Description: Absence of new skin breakdown  Outcome: Ongoing     Problem: Nutrition  Goal: Optimal nutrition therapy  Description: Nutrition Problem #1: Inadequate oral intake  Intervention: Food and/or Nutrient Delivery: Continue NPO, Start Tube Feeding  Nutritional Goals: EN intake to meet % of estimated nutrition needs     Outcome: Ongoing     Problem: OXYGENATION/RESPIRATORY FUNCTION  Goal: Patient will maintain patent airway  Outcome: Ongoing     Problem: OXYGENATION/RESPIRATORY FUNCTION  Goal: Patient will achieve/maintain normal respiratory rate/effort  Description: Respiratory rate and effort will be within normal limits for the patient  Outcome: Ongoing     Problem: MECHANICAL VENTILATION  Goal: Patient will maintain patent airway  Outcome: Ongoing     Problem: MECHANICAL VENTILATION  Goal: Oral health is maintained or improved  Outcome: Ongoing     Problem: MECHANICAL VENTILATION  Goal: ET tube will be managed safely  Outcome: Ongoing     Problem: MECHANICAL VENTILATION  Goal: Ability to express needs and understand communication  Outcome: Ongoing     Problem: MECHANICAL VENTILATION  Goal: Mobility/activity is maintained at optimum level for patient  Outcome: Ongoing     Problem: Confusion - Acute:  Goal: Absence of continued neurological deterioration signs and symptoms  Description: Absence of continued neurological deterioration signs and symptoms  Outcome: Ongoing     Problem: Confusion - Acute:  Goal: Mental status will be restored to baseline  Description: Mental status will be restored to baseline  Outcome: Ongoing     Problem: Discharge Planning:  Goal: Ability to perform activities of daily living will improve  Description: Ability to perform activities of daily living will improve  Outcome: Ongoing     Problem: Discharge Planning:  Goal: Participates in care planning  Description: Participates in care planning  Outcome: Ongoing     Problem: Injury - Risk of, Physical Injury:  Goal: Absence of physical injury  Description: Absence of physical injury  Outcome: Ongoing     Problem: Injury - Risk of, Physical Injury:  Goal: Will remain free from falls  Description: Will remain free from falls  Outcome: Ongoing     Problem: Mood - Altered:  Goal: Mood stable  Description: Mood stable  Outcome: Ongoing     Problem: Mood - Altered:  Goal: Absence of abusive behavior  Description: Absence of abusive behavior  Outcome: Ongoing     Problem: Mood - Altered:  Goal: Verbalizations of feeling emotionally comfortable while being cared for will increase  Description: Verbalizations of feeling emotionally comfortable while being cared for will increase  Outcome: Ongoing     Problem: Psychomotor Activity - Altered:  Goal: Absence of psychomotor disturbance signs and symptoms  Description: Absence of psychomotor disturbance signs and symptoms  Outcome: Ongoing     Problem: Sensory Perception - Impaired:  Goal: Demonstrations of improved sensory functioning will increase  Description: Demonstrations of improved sensory functioning will increase  Outcome: Ongoing     Problem: Sensory Perception - Impaired:  Goal: Decrease in sensory misperception frequency  Description: Decrease in sensory misperception frequency  Outcome: Ongoing     Problem: Sensory Perception - Impaired:  Goal: Able to refrain from responding to false sensory perceptions  Description: Able to refrain from responding to false sensory perceptions  Outcome: Ongoing     Problem: Sensory Perception - Impaired:  Goal: Demonstrates accurate environmental perceptions  Description: Demonstrates accurate environmental perceptions  Outcome: Ongoing     Problem: Sensory Perception - Impaired:  Goal: Able to distinguish between reality-based and nonreality-based thinking  Description: Able to distinguish between reality-based and nonreality-based thinking  Outcome: Ongoing     Problem: Sensory Perception - Impaired:  Goal: Able to interrupt nonreality-based thinking  Description: Able to interrupt nonreality-based thinking  Outcome: Ongoing     Problem: Sleep Pattern Disturbance:  Goal: Appears well-rested  Description: Appears well-rested  Outcome: Ongoing     Problem: Airway Clearance - Ineffective:  Goal: Ability to maintain a clear airway will improve  Description: Ability to maintain a clear airway will improve  Outcome: Ongoing     Problem: Anxiety/Stress:  Goal: Level of anxiety will decrease  Description: Level of anxiety will decrease  Outcome: Ongoing     Problem: Aspiration:  Goal: Absence of aspiration  Description: Absence of aspiration  Outcome: Ongoing     Problem: Cardiac Output - Decreased:  Goal: Hemodynamic stability will improve  Description: Hemodynamic stability will improve  Outcome: Ongoing     Problem: Fluid Volume - Imbalance:  Goal: Absence of imbalanced fluid volume signs and symptoms  Description: Absence of imbalanced fluid volume signs and symptoms  Outcome: Ongoing     Problem: Gas Exchange - Impaired:  Goal: Levels of oxygenation will improve  Description: Levels of oxygenation will improve  Outcome: Ongoing     Problem: Nutrition Deficit:  Goal: Ability to achieve adequate nutritional intake will improve  Description: Ability to achieve adequate nutritional intake will improve  Outcome: Ongoing     Problem: Pain:  Goal: Pain level will decrease  Description: Pain level will decrease  Outcome: Ongoing     Problem: Pain:  Goal: Recognizes and communicates pain  Description: Recognizes and communicates pain  Outcome: Ongoing     Problem: Pain:  Goal: Control of acute pain  Description: Control of acute pain  Outcome: Ongoing     Problem: Serum Glucose Level - Abnormal:  Goal: Ability to maintain appropriate glucose levels will

## 2020-12-10 NOTE — PROGRESS NOTES
[facility] -   Infectious diseases evaluation  admission date 12/2/2020    reason for consultation:   LLE cellulitis    Impression :   Current:  · LLE Cellulitis resolved  · abd wall cellulitis resolved  · Acute hypoxic hypercapnic respiratory failure  · Mild Leucocytosis  · Persistent fever -   · CRP elevation  · pulm mucous plugs, removed by bronch -BAL cx neg  · bacteremia x 2 2/17   · Multifocal pneumonia vs inters pneumonia   · CRISS 12/9  · Staph epi double bacteremia 12/7    Discussion / summary of stay / plan of care   ·   Recommendations   Stopped zosyn and vanco since fever failed to respond  12/9 switched to ceftaroline pend BC final ID  12/9 CXR suggesting a multifocal inters pneumonia for the first time. · Look for histoplasma  · Get mycoplasma  · Get legionella neg  · 12/9 Add levaquin iv pend above  · Blood cx 12/7: x2 + staph epidermidis, ?significance  · Blood cx 12/6 no growth  · CRP  474  · Consider CT chest and abd - cannot complete at this time, pt requiring increased ventilator support  · Ask for LP by IR and start acyclovir 10 mg per kg q 8 hrs - cannot complete at this time, requiring increased ventilator support  · 12/9  itraconazole 200 mg q 8 and get a itracon level on 12/12, pend histo WEBER    Infection Control Recommendations   · [place] Precautions    Antimicrobial Stewardship Recommendations   · Simplification of therapy  · Targeted therapy  Coordination ofOutpatient Care:   · Estimated Length of IV antimicrobials:  · Patient will need Midline / picc Catheter Insertion:   · Patient will need SNF:  · Patient will need outpatient wound care:     History of Present Illness:   Initial history:  Joel Shaver is a 37y.o.-year-old male with hx of ERA on BiPAP with morbid obesity presented to [facility] with complaint of pain and swelling to [facility] on 11/27/2020.  Per mother, patient had similar symptoms 2-3 months ago and was seen at [facility] and was discharged on antibiotics. Patient completed course at that time and symptoms resolved. Mother states patient now has similar symptoms to LLE and now to abdomen. LLE xray at [place] completed - negative. BLE venous doppler negative. Patient had worsening respiratory status at [address] with respiratory acidosis requiring intubation and transfer to [facility].    At this time LLE cellulitis improved and no cellulites noted to abdomen. Currently on zosyn, vanco stopped for renal protection. Patient continuously running fever. Tmax 102.6 today,  Bedside bronchoscopy revealed basilar atelectasis with mild mucous plugging. So far cultures negative. CXR indicating worsening pulmonary edema and increased right pleural effusion. Underlying pneumonia not excluded.       Interval changes  12/10/2020   Patient Vitals for the past 8 hrs:   BP Temp Temp src Pulse Resp SpO2 Weight   12/10/20 1215 (!) 103/49 -- -- 97 29 (!) 89 % --   12/10/20 1200 (!) 101/46 100.2 °F (37.9 °C) CORE 97 20 (!) 88 % --   12/10/20 1145 (!) 111/50 -- -- 100 29 91 % --   12/10/20 1130 (!) 112/55 -- -- 100 28 (!) 89 % --   12/10/20 1115 (!) 103/48 -- -- 103 23 (!) 88 % --   12/10/20 1100 (!) 111/54 -- -- 96 28 (!) 81 % --   12/10/20 1045 (!) 110/52 -- -- 97 30 90 % --   12/10/20 1030 (!) 105/57 -- -- 96 (!) 31 (!) 89 % --   12/10/20 1015 (!) 108/54 -- -- 95 24 (!) 86 % --   12/10/20 1000 (!) 106/51 -- -- 96 (!) 31 (!) 85 % --   12/10/20 0945 (!) 111/56 -- -- 96 30 (!) 85 % --   12/10/20 0935 (!) 115/58 -- -- 96 (!) 32 (!) 84 % --   12/10/20 0930 -- -- -- 94 (!) 32 (!) 86 % --   12/10/20 0915 (!) 109/59 -- -- 96 29 (!) 87 % --   12/10/20 0900 107/61 -- -- 97 (!) 32 (!) 84 % --   12/10/20 0845 127/75 -- -- 99 (!) 32 (!) 75 % --   12/10/20 0830 (!) 87/44 -- -- 99 30 (!) 83 % --   12/10/20 0815 (!) 106/48 -- -- 101 29 (!) 85 % --   12/10/20 0800 117/72 99 °F (37.2 °C) Oral 101 29 (!) 72 % --   12/10/20 0751 -- -- -- 101 27 (!) 79 % --   12/10/20 0745 108/66 -- -- 99 28 (!) 70 % --   12/10/20 0730 (!) 78/53 -- -- 104 30 (!) 80 % --   12/10/20 0715 (!) 116/56 -- -- 105 (!) 32 (!) 82 % --   12/10/20 0700 (!) 102/49 -- -- 105 (!) 31 (!) 82 % --   12/10/20 0645 118/63 -- -- 107 29 (!) 83 % --   12/10/20 0641 -- -- -- 115 (!) 39 (!) 80 % --   12/10/20 0630 (!) 111/48 -- -- 109 (!) 32 (!) 78 % --   12/10/20 0615 (!) 109/54 -- -- 116 20 (!) 72 % (!) 610 lb 0.2 oz (276.7 kg)   12/10/20 0606 -- -- -- 115 (!) 32 -- --   12/10/20 0600 (!) 124/58 -- -- 110 23 (!) 81 % --   12/10/20 0545 (!) 93/47 -- -- 99 26 (!) 87 % --   12/10/20 0530 113/64 -- -- 100 (!) 31 (!) 85 % --   12/10/20 0515 119/76 -- -- 98 23 (!) 80 % --   12/10/20 0500 109/61 -- -- 97 30 (!) 88 % --     No acute events overnight. Patient ventilated and sedated. Desaturating into 80s while on FiO2 100, Peep 20. Unable to get CT or LP due to patient increased need for ventilatory support. LLE and abdominal cellulitis resolved  Afebrile. CRP elevated 474  WBC 12.5, not correlating w the fever    CXR 12/10 mild increase diffuse opacities, edema vs pneumonia    Blood cultures 12/7  x2 + staph epidermidis    Summary of relevant labs:  Labs:  WBC 13-12-11-12 -11.5 - 13.4 - 9.9 - 12.5  Cr 1.3 -  1.61 - 1.98 - 2.14 - 2.04  Lactic 1.2 - 1.6   - 474      Micro:  Blood cx 12/7 S x2 + staph epidermidis  Blood cx 12/2 & 12/6 no growth  Sputum cx 12/6 neg  Respiratory pcr panel 12/2 COVID negative  Hepatitis panel negative  Histoplasma Ab, Ag  Cryptococcal   Legionella  Meningitis panel  CSF cx    Imaging:  CXR 12/10: No significant interval change from 12/09/2020. Mildly increased diffuse   opacities in the lungs when compared to 12/07/2020, possibly edema or pneumonia. CXR 12/9: Persistent bilateral pulmonary opacities could represent pulmonary edema or pneumonia        Liver US 12/9: Unremarkable right upper quadrant ultrasound. Echo 12/8: EF 45%.  Leftward compression of inter-ventricular septum (\"D-sign\") indicating RV pressure overload. Right atrium is severely dilated . Right ventricle is severely dilated with reduced systolic function. CXR 12/7: Worsening pulmonary edema and increased right pleural effusion. Underlying pneumonia is not excluded. I have personally reviewed the past medical history, past surgical history, medications, social history, and family history, and I haveupdated the database accordingly. Allergies:   Patient has no known allergies. Review of Systems:     Review of Systems   Unable to perform ROS: Intubated       Physical Examination :       Physical Exam  Constitutional:       General: He is not in acute distress. Appearance: He is obese. He is not ill-appearing. HENT:      Head: Normocephalic. Nose: Nose normal.      Mouth/Throat:      Comments: Orally intubated  Cardiovascular:      Rate and Rhythm: Normal rate and regular rhythm. Pulses: Normal pulses. Heart sounds: Normal heart sounds. Pulmonary:      Effort: Tachypnea present. Breath sounds: Wheezing present. Abdominal:      General: Bowel sounds are normal.      Palpations: Abdomen is soft. Genitourinary:     Comments: Johnson in, Sarahi urine  Skin:     General: Skin is warm and dry. Findings: No rash. Neurological:      Comments: Calm on vent         Past Medical History:   No past medical history on file. Past Surgical  History:   No past surgical history on file.     Medications:      ceftaroline fosamil (TEFLARO) IVPB  600 mg Intravenous Q12H    levofloxacin  500 mg Intravenous Q24H    itraconazole  200 mg Oral TID    acyclovir  10 mg/kg (Ideal) Intravenous Q8H    midodrine  10 mg Oral 4x Daily    lidocaine PF  5 mL Nebulization Once    lansoprazole  30 mg Per NG tube QAM AC    sodium chloride flush  10 mL Intravenous 2 times per day       Social History:     Social History     Socioeconomic History    Marital status: Single     Spouse name: Not on file    Number of children: Not on file    Years of education: Not on file    Highest education level: Not on file   Occupational History    Not on file   Social Needs    Financial resource strain: Not on file    Food insecurity     Worry: Not on file     Inability: Not on file    Transportation needs     Medical: Not on file     Non-medical: Not on file   Tobacco Use    Smoking status: Not on file   Substance and Sexual Activity    Alcohol use: Not on file    Drug use: Not on file    Sexual activity: Not on file   Lifestyle    Physical activity     Days per week: Not on file     Minutes per session: Not on file    Stress: Not on file   Relationships    Social connections     Talks on phone: Not on file     Gets together: Not on file     Attends Christian service: Not on file     Active member of club or organization: Not on file     Attends meetings of clubs or organizations: Not on file     Relationship status: Not on file    Intimate partner violence     Fear of current or ex partner: Not on file     Emotionally abused: Not on file     Physically abused: Not on file     Forced sexual activity: Not on file   Other Topics Concern    Not on file   Social History Narrative    Not on file       Family History:   No family history on file.    Medical Decision Making:   I have independently reviewed/ordered the following labs:    CBC with Differential:   Recent Labs     12/09/20 0449 12/10/20  0449   WBC 9.9 12.5*   HGB 9.4* 9.2*   HCT 31.8* 31.8*   PLT See Reflexed IPF Result 227   LYMPHOPCT 13* 10*   MONOPCT 7 8     BMP:  Recent Labs     12/09/20 0449 12/10/20  0449    142   K 4.9 4.5    100   CO2 25 29   BUN 74* 81*   CREATININE 2.14* 2.04*     Hepatic Function Panel:   Recent Labs     12/09/20 0449 12/10/20  0449   PROT 7.6 7.9   LABALBU 1.5* 1.7*   BILIDIR 0.65* 0.90*   IBILI 0.30 0.21   BILITOT 0.95 1.11   ALKPHOS 93 97   ALT 65* 63*   * 73* No results for input(s): RPR in the last 72 hours. No results for input(s): HIV in the last 72 hours. No results for input(s): BC in the last 72 hours. Lab Results   Component Value Date    CREATININE 2.04 12/10/2020    GLUCOSE 118 12/10/2020       Detailed results: Thank you for allowing us to participate in the care of this patient. Please call with questions. This note is created with the assistance of a speech recognition program.  While intending to generate adocument that actually reflects the content of the visit, the document can still have some errors including those of syntax and sound a like substitutions which may escape proof reading. It such instances, actual meaningcan be extrapolated by contextual diversion. [address] [place] [address]  Office: [phone]  Perfect serve / office [phone]    I have discussed the care of the patient, including pertinent history and exam findings,  with the student. I have seen and examined the patient and the key elements of all parts of the encounter have been performed by me.   I agree with the assessment, plan and orders as documented by the student    Denice King, Infectious Diseases

## 2020-12-10 NOTE — CONSULTS
mcg/min at 12/10/20 1752    ceftaroline fosamil (TEFLARO) 600 mg in dextrose 5 % 50 mL IVPB, 600 mg, Intravenous, Q12H, Denice Francois MD, Stopped at 12/10/20 1730    levoFLOXacin (LEVAQUIN) 500 MG/100ML infusion 500 mg, 500 mg, Intravenous, Q24H, Lorne Castellanos MD, Stopped at 12/10/20 0050    itraconazole (SPORANOX) capsule 200 mg, 200 mg, Oral, TID, Denice Francois MD, 200 mg at 12/10/20 1330    acyclovir (ZOVIRAX) 700 mg in dextrose 5 % 250 mL IVPB, 10 mg/kg (Ideal), Intravenous, Q8H, Denice Francois MD, Stopped at 12/10/20 1412    midodrine (PROAMATINE) tablet 10 mg, 10 mg, Oral, 4x Daily, Mago Smith MD, 10 mg at 12/10/20 1628    heparin (porcine) injection 10,000 Units, 10,000 Units, Intravenous, PRN, Santiago Salcido MD    heparin (porcine) injection 5,000 Units, 5,000 Units, Intravenous, PRN, Santiago Salcido MD, 5,000 Units at 12/09/20 0531    heparin 25,000 units in dextrose 5% 250 mL infusion, 7.6 Units/kg/hr, Intravenous, Continuous, Howie Bran MD, Last Rate: 41.4 mL/hr at 12/10/20 1816, 15 Units/kg/hr at 12/10/20 1816    acetaminophen (TYLENOL) tablet 650 mg, 650 mg, Oral, Q4H PRN, 650 mg at 12/09/20 0132 **OR** acetaminophen (TYLENOL) suppository 650 mg, 650 mg, Rectal, Q4H PRN, Jie Griffiths MD    lidocaine PF 1 % injection 5 mL, 5 mL, Nebulization, Once, Marquita Jane MD    lansoprazole suspension SUSP 30 mg, 30 mg, Per NG tube, QAM AC, Chantel Long MD, 30 mg at 12/10/20 0900    sodium chloride flush 0.9 % injection 10 mL, 10 mL, Intravenous, 2 times per day, Chantel Long MD, 10 mL at 12/09/20 2233    sodium chloride flush 0.9 % injection 10 mL, 10 mL, Intravenous, PRN, Chantel Long MD    polyethylene glycol [facility]) packet 17 g, 17 g, Oral, Daily PRN, Chantel Long MD    promethazine (PHENERGAN) tablet 12.5 mg, 12.5 mg, Oral, Q6H PRN **OR** ondansetron (ZOFRAN) injection 4 mg, 4 mg, Intravenous, Q6H PRN, Chantel Long MD    propofol injection, 10 mcg/kg/min, Intravenous, Titrated, Fransico Blood MD, Stopped at 12/10/20 1750    REVIEW OF SYSTEMS:      Unable to perform ROS due to patient being intubated and mechanically ventilated. PHYSICAL EXAM:    VITALS:  BP (!) 101/40   Pulse 91   Temp 99 °F (37.2 °C) (Oral)   Resp 28   Ht 5' 9\" (1.753 m)   Wt (!) 610 lb 0.2 oz (276.7 kg)   SpO2 90%   BMI 90.08 kg/m²   INTAKE/OUTPUT:     Intake/Output Summary (Last 24 hours) at 12/10/2020 1853  Last data filed at 12/10/2020 1750  Gross per 24 hour   Intake 5281.13 ml   Output 2665 ml   Net 2616.13 ml       General: Critically ill, intubated, sedated  HEENT:  NC/AT. Conjunctiva moist without icterus. Ears are symmetric. Nares are patent. Oral mucus membranes are moist.  Neck:  Supple. No LAD. Cardiovascular:  Hypotensive on pressors, tachycardic  Respiratory:  Equal chest rise bilaterally. Mechanically ventilated. Abdomen: Morbidly obese. Soft, non tender, non distended. Neuro:  Sedated, unresponsive  Extremities: No apparent deformity, swelling  Skin:  Left lower extremity erythema. Labs:   Lab Results   Component Value Date    WBC 12.5 12/10/2020    HGB 9.2 12/10/2020    HCT 31.8 12/10/2020    MCV 99.1 12/10/2020     12/10/2020     Lab Results   Component Value Date     12/10/2020    K 4.5 12/10/2020     12/10/2020    CO2 29 12/10/2020    BUN 81 12/10/2020    CREATININE 2.04 12/10/2020    GLUCOSE 118 12/10/2020    CALCIUM 8.3 12/10/2020     No results found for: INR    Imaging:    ECHO  Summary  Technically difficult study. Left ventricle is normal in size, increased septal wall thickness, global  left ventricular systolic function is mildly reduced. Estimated ejection fraction is 45%. Leftward compression of  inter-ventricular septum (\"D-sign\") indicating RV pressure overload. Left atrium is at upper limits of normal.  Right atrium is severely dilated . Right ventricle is severely dilated with reduced systolic function.   No significant valvular abnormalities. Impression:  Patient Active Problem List   Diagnosis    Acute respiratory failure with hypoxia and hypercapnia (HCC)    Cellulitis of left leg    ERA (obstructive sleep apnea)    Morbid obesity ([facility]    ELIECER (acute kidney injury) ([facility]    Hypernatremia    Arterial hypotension    Sepsis with acute hypercapnic respiratory failure and septic shock ([facility]       1. 38 yo M with hypoxic and hypercapnic respiratory failure and shock. Recommendation:    1. Continue supportive care per primary ICU team  2. Continue heparin drip  3. No acute intervention at this time. 4. Positive blood cultures with Staph. Epidermidis causing septic shock picture  5. Most likely pulmonary hypertension at baseline secondary to morbid obesity  6. Vascular surgery to sign off at this time.     Christophe Callejas MS4  Stew Hernández MD  General Surgery PGY3  Available via [employer]  Attending: Niya Carrero MD

## 2020-12-11 NOTE — PLAN OF CARE
patient  12/10/2020 1937 by Emilie Ron  Outcome: Ongoing  12/10/2020 1122 by Alessio Fox RN  Outcome: Ongoing  12/10/2020 0807 by Mi Harden RCP  Outcome: Ongoing  12/10/2020 0656 by Jojo Chan RN  Outcome: Ongoing     Problem: MECHANICAL VENTILATION  Goal: Patient will maintain patent airway  12/10/2020 1937 by Emilie Ron  Outcome: Ongoing  12/10/2020 1122 by Alessio Fox RN  Outcome: Ongoing  12/10/2020 0807 by Mi Harden RCP  Outcome: Ongoing  12/10/2020 0656 by Jojo Chan RN  Outcome: Ongoing  Goal: Oral health is maintained or improved  12/10/2020 1937 by Emilie Ron  Outcome: Ongoing  12/10/2020 1122 by Alessio Fox RN  Outcome: Ongoing  12/10/2020 0807 by Mi Harden RCP  Outcome: Ongoing  12/10/2020 0656 by Jojo Chan RN  Outcome: Ongoing  Goal: ET tube will be managed safely  12/10/2020 1937 by Emilie Ron  Outcome: Ongoing  12/10/2020 1122 by Alessio Fox RN  Outcome: Ongoing  12/10/2020 0807 by Mi Harden RCP  Outcome: Ongoing  12/10/2020 0656 by Jojo Chan RN  Outcome: Ongoing  Goal: Ability to express needs and understand communication  12/10/2020 1937 by Emilie Ron  Outcome: Ongoing  12/10/2020 1122 by Alessio Fox RN  Outcome: Ongoing  12/10/2020 0807 by Mi Harden RCP  Outcome: Ongoing  12/10/2020 0656 by Jojo Chan RN  Outcome: Ongoing  Goal: Mobility/activity is maintained at optimum level for patient  12/10/2020 1937 by Emilie Ron  Outcome: Ongoing  12/10/2020 1122 by Alessio Fox RN  Outcome: Ongoing  12/10/2020 0807 by Mi Harden RCP  Outcome: Ongoing  12/10/2020 0656 by Jojo Chan RN  Outcome: Ongoing     Problem: Confusion - Acute:  Goal: Absence of continued neurological deterioration signs and symptoms  Description: Absence of continued neurological deterioration signs and symptoms  12/10/2020 1937 by Emilie Ron  Outcome: Ongoing  12/10/2020 1122 by Connor Guan RN  Outcome: Ongoing  12/10/2020 0656 by Tabby Patel RN  Outcome: Ongoing  Goal: Mental status will be restored to baseline  Description: Mental status will be restored to baseline  12/10/2020 1937 by Cele Best  Outcome: Ongoing  12/10/2020 1122 by Connor Guan RN  Outcome: Ongoing  12/10/2020 0656 by Tabby Patel RN  Outcome: Ongoing     Problem: Discharge Planning:  Goal: Ability to perform activities of daily living will improve  Description: Ability to perform activities of daily living will improve  12/10/2020 1937 by Cele Best  Outcome: Ongoing  12/10/2020 1122 by Connor Guan RN  Outcome: Ongoing  12/10/2020 0656 by Tabby Patel RN  Outcome: Ongoing  Goal: Participates in care planning  Description: Participates in care planning  12/10/2020 1937 by Cele Best  Outcome: Ongoing  12/10/2020 1122 by Connor Guan RN  Outcome: Ongoing  12/10/2020 0656 by Tabby Patel RN  Outcome: Ongoing     Problem: Injury - Risk of, Physical Injury:  Goal: Absence of physical injury  Description: Absence of physical injury  12/10/2020 1937 by Cele Best  Outcome: Ongoing  12/10/2020 1122 by Connor Guan RN  Outcome: Ongoing  12/10/2020 0656 by Tabby Patel RN  Outcome: Ongoing  Goal: Will remain free from falls  Description: Will remain free from falls  12/10/2020 1937 by Cele Best  Outcome: Ongoing  12/10/2020 1122 by Connor Guan RN  Outcome: Ongoing  12/10/2020 0656 by Tabby Patel RN  Outcome: Ongoing     Problem: Mood - Altered:  Goal: Mood stable  Description: Mood stable  12/10/2020 1937 by Cele Best  Outcome: Ongoing  12/10/2020 1122 by Connor Guan RN  Outcome: Ongoing  12/10/2020 0656 by Tabby Patel RN  Outcome: Ongoing  Goal: Absence of abusive behavior  Description: Absence of abusive behavior  12/10/2020 1937 by Cele Best  Outcome: Ongoing  12/10/2020 1122 by Connor Guan RN  Outcome: Ongoing  12/10/2020 8081 by Johnathan Magaña RN  Outcome: Ongoing  Goal: Verbalizations of feeling emotionally comfortable while being cared for will increase  Description: Verbalizations of feeling emotionally comfortable while being cared for will increase  12/10/2020 1937 by Mary Amos  Outcome: Ongoing  12/10/2020 1122 by Fabián Borges RN  Outcome: Ongoing  12/10/2020 0656 by Johnathan Magaña RN  Outcome: Ongoing     Problem: Psychomotor Activity - Altered:  Goal: Absence of psychomotor disturbance signs and symptoms  Description: Absence of psychomotor disturbance signs and symptoms  12/10/2020 1937 by Mary Amos  Outcome: Ongoing  12/10/2020 1122 by Fabián Borges RN  Outcome: Ongoing  12/10/2020 0656 by Johnathan Magaña RN  Outcome: Ongoing     Problem: Sensory Perception - Impaired:  Goal: Demonstrations of improved sensory functioning will increase  Description: Demonstrations of improved sensory functioning will increase  12/10/2020 1937 by Mary Amos  Outcome: Ongoing  12/10/2020 1122 by Fabián Borges RN  Outcome: Ongoing  12/10/2020 0656 by Johnathan Magaña RN  Outcome: Ongoing  Goal: Decrease in sensory misperception frequency  Description: Decrease in sensory misperception frequency  12/10/2020 1937 by Mary Amos  Outcome: Ongoing  12/10/2020 1122 by Fabián Borges RN  Outcome: Ongoing  12/10/2020 0656 by Johnathan Magaña RN  Outcome: Ongoing  Goal: Able to refrain from responding to false sensory perceptions  Description: Able to refrain from responding to false sensory perceptions  12/10/2020 1937 by Mary Amos  Outcome: Ongoing  12/10/2020 1122 by Fabián Borges RN  Outcome: Ongoing  12/10/2020 0656 by Johnathan Magaña RN  Outcome: Ongoing  Goal: Demonstrates accurate environmental perceptions  Description: Demonstrates accurate environmental perceptions  12/10/2020 1937 by Mary Amos  Outcome: Ongoing  12/10/2020 1122 by Fabián Borges RN  Outcome: Ongoing  12/10/2020 8843 by Raeann Alvares RN  Outcome: Ongoing  Goal: Able to distinguish between reality-based and nonreality-based thinking  Description: Able to distinguish between reality-based and nonreality-based thinking  12/10/2020 1937 by Eric Hewitt  Outcome: Ongoing  12/10/2020 1122 by Lazara Nunez RN  Outcome: Ongoing  12/10/2020 0656 by Raeann Alvares RN  Outcome: Ongoing  Goal: Able to interrupt nonreality-based thinking  Description: Able to interrupt nonreality-based thinking  12/10/2020 1937 by Eric Staff  Outcome: Ongoing  12/10/2020 1122 by Lazara Nunez RN  Outcome: Ongoing  12/10/2020 0656 by Raeann Alvares RN  Outcome: Ongoing     Problem: Sleep Pattern Disturbance:  Goal: Appears well-rested  Description: Appears well-rested  12/10/2020 1937 by Eric Hewitt  Outcome: Ongoing  12/10/2020 1122 by Lazara Nunez RN  Outcome: Ongoing  12/10/2020 0656 by Raeann Alvares RN  Outcome: Ongoing     Problem: Airway Clearance - Ineffective:  Goal: Ability to maintain a clear airway will improve  Description: Ability to maintain a clear airway will improve  12/10/2020 1937 by Eric Hewitt  Outcome: Ongoing  12/10/2020 1122 by Lazara Nunez RN  Outcome: Ongoing  12/10/2020 0807 by Gi Zavala RCP  Outcome: Ongoing  12/10/2020 0656 by Raeann Alvares RN  Outcome: Ongoing     Problem: Anxiety/Stress:  Goal: Level of anxiety will decrease  Description: Level of anxiety will decrease  12/10/2020 1937 by Eric Hewitt  Outcome: Ongoing  12/10/2020 1122 by Lazara Nunez RN  Outcome: Ongoing  12/10/2020 0656 by Raeann Alvares RN  Outcome: Ongoing     Problem: Aspiration:  Goal: Absence of aspiration  Description: Absence of aspiration  12/10/2020 1937 by Eric Hewitt  Outcome: Ongoing  12/10/2020 1122 by Lazara Nunez RN  Outcome: Ongoing  12/10/2020 0807 by Gi Zavala RCP  Outcome: Ongoing  12/10/2020 0656 by Raeann Alvares RN  Outcome: Ongoing     Problem: Cardiac Output - Decreased:  Goal: Hemodynamic stability will improve  Description: Hemodynamic stability will improve  12/10/2020 1937 by Leila Denny  Outcome: Ongoing  12/10/2020 1122 by Ofelia Shah RN  Outcome: Ongoing  12/10/2020 0656 by Joaquin Ramírez RN  Outcome: Ongoing     Problem: Fluid Volume - Imbalance:  Goal: Absence of imbalanced fluid volume signs and symptoms  Description: Absence of imbalanced fluid volume signs and symptoms  12/10/2020 1937 by Leila Denny  Outcome: Ongoing  12/10/2020 1122 by Ofelia Shah RN  Outcome: Ongoing  12/10/2020 0656 by Joaquin Ramírez RN  Outcome: Ongoing     Problem: Gas Exchange - Impaired:  Goal: Levels of oxygenation will improve  Description: Levels of oxygenation will improve  12/10/2020 1937 by Leila Denny  Outcome: Ongoing  12/10/2020 1122 by Ofelia Shah RN  Outcome: Ongoing  12/10/2020 0807 by Rudy Mary RCP  Outcome: Ongoing  12/10/2020 0656 by Joaquin Ramírez RN  Outcome: Ongoing     Problem: Nutrition Deficit:  Goal: Ability to achieve adequate nutritional intake will improve  Description: Ability to achieve adequate nutritional intake will improve  12/10/2020 1937 by Leila Denny  Outcome: Ongoing  12/10/2020 1122 by Ofelia Shah RN  Outcome: Ongoing  12/10/2020 0656 by Joaquin Ramírez RN  Outcome: Ongoing     Problem: Pain:  Goal: Pain level will decrease  Description: Pain level will decrease  12/10/2020 1937 by Leila Denny  Outcome: Ongoing  12/10/2020 1122 by Ofelia Shah RN  Outcome: Ongoing  12/10/2020 0656 by Joaquin Ramírez RN  Outcome: Ongoing  Goal: Recognizes and communicates pain  Description: Recognizes and communicates pain  12/10/2020 1937 by Leila Denny  Outcome: Ongoing  12/10/2020 1122 by Ofelia Shah RN  Outcome: Ongoing  12/10/2020 0656 by Joaquin Ramírez RN  Outcome: Ongoing  Goal: Control of acute pain  Description: Control of acute pain  12/10/2020 1937 by Leila Denny  Outcome: Ongoing  12/10/2020 1122 by Rosa Reed RN  Outcome: Ongoing  12/10/2020 0656 by Brayden Rodriguez RN  Outcome: Ongoing     Problem: Serum Glucose Level - Abnormal:  Goal: Ability to maintain appropriate glucose levels will improve to within specified parameters  Description: Ability to maintain appropriate glucose levels will improve to within specified parameters  12/10/2020 1937 by Noy Khan  Outcome: Ongoing  12/10/2020 1122 by Rosa Reed RN  Outcome: Ongoing  12/10/2020 0656 by Brayden Rodriguez RN  Outcome: Ongoing     Problem: Skin Integrity - Impaired:  Goal: Will show no infection signs and symptoms  Description: Will show no infection signs and symptoms  12/10/2020 1937 by Noy Khan  Outcome: Ongoing  12/10/2020 1122 by Rosa Reed RN  Outcome: Ongoing  12/10/2020 0807 by Dwayne Garcia RCP  Outcome: Ongoing  12/10/2020 0656 by Brayden Rodriguez RN  Outcome: Ongoing  Goal: Absence of new skin breakdown  Description: Absence of new skin breakdown  12/10/2020 1937 by Noy Khan  Outcome: Ongoing  12/10/2020 1122 by Rosa Reed RN  Outcome: Ongoing  12/10/2020 0807 by Dwayne Garcia RCP  Outcome: Ongoing  12/10/2020 0656 by Brayden Rodriguez RN  Outcome: Ongoing     Problem: Tissue Perfusion, Altered:  Goal: Circulatory function within specified parameters  Description: Circulatory function within specified parameters  12/10/2020 1937 by Noy Khan  Outcome: Ongoing  12/10/2020 1122 by Rosa Reed RN  Outcome: Ongoing  12/10/2020 0656 by Brayden Rodriguez RN  Outcome: Ongoing     Problem: Tissue Perfusion - Cardiopulmonary, Altered:  Goal: Absence of angina  Description: Absence of angina  12/10/2020 1937 by Noy Khan  Outcome: Ongoing  12/10/2020 1122 by Rosa Reed RN  Outcome: Ongoing  12/10/2020 0656 by Al Smith RN  Outcome: Ongoing  Goal: Hemodynamic stability will improve  Description: Hemodynamic stability will improve  12/10/2020 1937 by Steven Best  Outcome: Ongoing  12/10/2020 1122 by Mkcenzie Sanabria RN  Outcome: Ongoing  12/10/2020 0656 by Des Tran RN  Outcome: Ongoing     Problem: Restraint Use - Nonviolent/Non-Self-Destructive Behavior:  Goal: Absence of restraint indications  Description: Absence of restraint indications  12/10/2020 1937 by Steven Best  Outcome: Not Met This Shift  12/10/2020 1122 by Mckenzie Sanabria RN  Outcome: Not Met This Shift  12/10/2020 0656 by Des Tran RN  Outcome: Not Met This Shift

## 2020-12-11 NOTE — PLAN OF CARE
clear  Inspiratory Pressure: 37  Plateau Pressure: 36    Patient assessed at level 1          [x]    Bronchodilator Assessment    BRONCHODILATOR ASSESSMENT SCORE  Score 0 (Home) 1 2 3 4   Breath Sounds   []  Chronic Ventilator: Patient at baseline [x]  Mild Wheezes/ Clear []  Intermittent wheezes with good air entry []  Bilateral/unilateral wheezing with diminished air entry []  Insp/Exp wheeze and/or poor aeration   Ventilator Pressures   []  Chronic Ventilator []  Insp. Pressure less than 25 cm H20 []  Insp. Pressure less than 25 cm H20 []  Insp. Pressure exceeds 25 cm H20 [x]  Insp.  Pressure exceeds 30 cm H20   Plateau Pressure []  NA   [x]  Plateau Pressure less than 4  []  Plateau Pressure less than or equal to 5 []  Plateau Pressure greater than or equal to 6 []  Plateau Pressure greater than or equal to 8       RYAN LR  11:43 AM   Intervention: Administer treatments as ordered  Note: BRONCHOSPASM/BRONCHOCONSTRICTION     [x]         IMPROVE AERATION/BREATH SOUNDS  [x]   ADMINISTER BRONCHODILATOR THERAPY AS APPROPRIATE  [x]   ASSESS BREATH SOUNDS  [x]   IMPLEMENT AEROSOL/MDI PROTOCOL  [x]   PATIENT EDUCATION AS NEEDED

## 2020-12-11 NOTE — PLAN OF CARE
Problem: Restraint Use - Nonviolent/Non-Self-Destructive Behavior:  Goal: Absence of restraint indications  Description: Absence of restraint indications  Outcome: Not Met This Shift  Goal: Absence of restraint-related injury  Description: Absence of restraint-related injury  Outcome: Met This Shift     Problem: Skin Integrity:  Goal: Will show no infection signs and symptoms  Description: Will show no infection signs and symptoms  Outcome: Ongoing  Goal: Absence of new skin breakdown  Description: Absence of new skin breakdown  Outcome: Ongoing     Problem: Nutrition  Goal: Optimal nutrition therapy  Description: Nutrition Problem #1: Inadequate oral intake  Intervention: Food and/or Nutrient Delivery: Continue NPO, Start Tube Feeding  Nutritional Goals: EN intake to meet % of estimated nutrition needs     Outcome: Ongoing     Problem: OXYGENATION/RESPIRATORY FUNCTION  Goal: Patient will maintain patent airway  12/11/2020 1253 by Connor Guan RN  Outcome: Ongoing  12/11/2020 1142 by Andi Carranza RCP  Outcome: Ongoing  Goal: Patient will achieve/maintain normal respiratory rate/effort  Description: Respiratory rate and effort will be within normal limits for the patient  12/11/2020 1253 by Connor Guan RN  Outcome: Ongoing  12/11/2020 1142 by Andi Carranza RCP  Outcome: Ongoing  Note:   PROVIDE ADEQUATE OXYGENATION WITH ACCEPTABLE SP02/ABG'S    [x]  IDENTIFY APPROPRIATE OXYGEN THERAPY  [x]   MONITOR SP02/ABG'S AS NEEDED   [x]   PATIENT EDUCATION AS NEEDED        Problem: MECHANICAL VENTILATION  Goal: Patient will maintain patent airway  12/11/2020 1253 by Connor Guan RN  Outcome: Ongoing  12/11/2020 1142 by Andi Carranza RCP  Outcome: Ongoing  Note: MECHANICAL VENTILATION     [x]  PROVIDE OPTIMAL VENTILATION  [x]   ASSESS FOR EXTUBATION READINESS  [x]   ASSESS FOR WEANING READINESS  [x]  EXTUBATE AS TOLERATED  [x]  IMPLEMENT ADULT MECHANICAL VENTILATION PROTOCOL  [x]  MAINTAIN ADEQUATE OXYGENATION  [x]  PERFORM SPONTANEOUS WEANING TRIAL AS TOLERATED     Goal: Oral health is maintained or improved  12/11/2020 1253 by Eulalia Schroeder RN  Outcome: Ongoing  12/11/2020 1142 by Bret Cohn RCP  Outcome: Ongoing  Goal: ET tube will be managed safely  12/11/2020 1253 by Eulalia Schroeder RN  Outcome: Ongoing  12/11/2020 1142 by Bret Cohn RCP  Outcome: Ongoing  Goal: Ability to express needs and understand communication  12/11/2020 1253 by Eulalia Schroeder RN  Outcome: Ongoing  12/11/2020 1142 by Bret Cohn RCP  Outcome: Ongoing  Goal: Mobility/activity is maintained at optimum level for patient  12/11/2020 1253 by Eulalia Schroeder RN  Outcome: Ongoing  12/11/2020 1142 by Bret Cohn RCP  Outcome: Ongoing     Problem: Confusion - Acute:  Goal: Absence of continued neurological deterioration signs and symptoms  Description: Absence of continued neurological deterioration signs and symptoms  Outcome: Ongoing  Goal: Mental status will be restored to baseline  Description: Mental status will be restored to baseline  Outcome: Ongoing     Problem: Discharge Planning:  Goal: Ability to perform activities of daily living will improve  Description: Ability to perform activities of daily living will improve  Outcome: Ongoing  Goal: Participates in care planning  Description: Participates in care planning  Outcome: Ongoing     Problem: Injury - Risk of, Physical Injury:  Goal: Absence of physical injury  Description: Absence of physical injury  Outcome: Ongoing  Goal: Will remain free from falls  Description: Will remain free from falls  Outcome: Ongoing     Problem: Mood - Altered:  Goal: Mood stable  Description: Mood stable  Outcome: Ongoing  Goal: Absence of abusive behavior  Description: Absence of abusive behavior  Outcome: Ongoing  Goal: Verbalizations of feeling emotionally comfortable while being cared for will increase  Description: Verbalizations of feeling emotionally comfortable while being cared for will increase  Outcome: Ongoing     Problem: Psychomotor Activity - Altered:  Goal: Absence of psychomotor disturbance signs and symptoms  Description: Absence of psychomotor disturbance signs and symptoms  Outcome: Ongoing     Problem: Sensory Perception - Impaired:  Goal: Demonstrations of improved sensory functioning will increase  Description: Demonstrations of improved sensory functioning will increase  Outcome: Ongoing  Goal: Decrease in sensory misperception frequency  Description: Decrease in sensory misperception frequency  Outcome: Ongoing  Goal: Able to refrain from responding to false sensory perceptions  Description: Able to refrain from responding to false sensory perceptions  Outcome: Ongoing  Goal: Demonstrates accurate environmental perceptions  Description: Demonstrates accurate environmental perceptions  Outcome: Ongoing  Goal: Able to distinguish between reality-based and nonreality-based thinking  Description: Able to distinguish between reality-based and nonreality-based thinking  Outcome: Ongoing  Goal: Able to interrupt nonreality-based thinking  Description: Able to interrupt nonreality-based thinking  Outcome: Ongoing     Problem: Sleep Pattern Disturbance:  Goal: Appears well-rested  Description: Appears well-rested  Outcome: Ongoing     Problem: Airway Clearance - Ineffective:  Goal: Ability to maintain a clear airway will improve  Description: Ability to maintain a clear airway will improve  Outcome: Ongoing     Problem: Anxiety/Stress:  Goal: Level of anxiety will decrease  Description: Level of anxiety will decrease  Outcome: Ongoing     Problem: Cardiac Output - Decreased:  Goal: Hemodynamic stability will improve  Description: Hemodynamic stability will improve  Outcome: Ongoing     Problem: Fluid Volume - Imbalance:  Goal: Absence of imbalanced fluid volume signs and symptoms  Description: Absence of imbalanced fluid volume signs and symptoms  Outcome: Ongoing     Problem: Gas Exchange - Impaired:  Goal: Levels of oxygenation will improve  Description: Levels of oxygenation will improve  12/11/2020 1253 by Rodo Shafer RN  Outcome: Ongoing  12/11/2020 1142 by Lina Miranda RCP  Outcome: Ongoing     Problem: Pain:  Description: Pain management should include both nonpharmacologic and pharmacologic interventions.   Goal: Pain level will decrease  Description: Pain level will decrease  Outcome: Ongoing  Goal: Recognizes and communicates pain  Description: Recognizes and communicates pain  Outcome: Ongoing  Goal: Control of acute pain  Description: Control of acute pain  Outcome: Ongoing     Problem: Skin Integrity - Impaired:  Goal: Will show no infection signs and symptoms  Description: Will show no infection signs and symptoms  Outcome: Ongoing  Goal: Absence of new skin breakdown  Description: Absence of new skin breakdown  Outcome: Ongoing

## 2020-12-11 NOTE — PROGRESS NOTES
[facility] -   Infectious diseases evaluation  admission date 12/2/2020    reason for consultation:   LLE cellulitis    Impression :   Current:  · LLE Cellulitis resolved  · abd wall cellulitis resolved  · Acute hypoxic hypercapnic respiratory failure  · Mild Leucocytosis  · Persistent fever -   · CRP elevation  · pulm mucous plugs, removed by bronch -BAL cx neg  · bacteremia x 2 2/17   · Multifocal pneumonia vs inters pneumonia   · CRISS 12/9  · Staph epi double bacteremia 12/7  · Morbid obesity    Discussion / summary of stay / plan of care   · Presents with bilateral lower extremity and lower abdomen cellulitis and picture of sepsis with respiratory failure  · Cellulitis improved but the patient has multiple mucous plug which necessitated endoscopy  · Fever persistent despite Zosyn and Vanco  · 12/9 Fever improved after antibiotic broadened with itraconazole and acyclovir but, CRP on the rise - LP, ultrasound of the abdomen, CT abdomen chest ,  not feasible due to size  · Patient remains with negative sputum culture, very high oxygen requirement, leukocytosis and elevated CRP, no clear diagnosis  · Blood cultures asked to from 12/7 staph epidermidis, unclear significance, repeat blood cultures negative  Recommendations   post zosyn and vanco   12/9 switched to ceftaroline, Levaquin, acyclovir, itraconazole  12/9 CXR suggesting a multifocal inters pneumonia for the first time.   · pend histoplasma and mycoplasma  · Blood cx 12/7: x2 + staph epidermidis, ?significance  · Blood cx 12/6 no growth  · itracon level on 12/12, pend histo WEBER      Infection Control Recommendations   · [place] Precautions    Antimicrobial Stewardship Recommendations   · Simplification of therapy  · Targeted therapy  Coordination ofOutpatient Care:   · Estimated Length of IV antimicrobials:  · Patient will need Midline / picc Catheter Insertion:   · Patient will need SNF:  · Patient will need 12/11/20 1100 (!) 105/53 -- -- 93 29 91 %   12/11/20 1045 (!) 101/48 -- -- 90 (!) 31 91 %   12/11/20 1030 (!) 102/54 -- -- 93 30 91 %   12/11/20 1015 (!) 102/46 -- -- 94 29 90 %   12/11/20 1000 (!) 103/45 -- -- 99 (!) 31 (!) 87 %   12/11/20 0945 (!) 106/54 -- -- 99 (!) 31 (!) 87 %   12/11/20 0930 (!) 107/49 -- -- 99 30 (!) 88 %   12/11/20 0915 (!) 102/46 -- -- 99 25 (!) 86 %   12/11/20 0900 (!) 98/49 -- -- 99 23 (!) 88 %   12/11/20 0845 (!) 105/50 -- -- 99 28 (!) 89 %   12/11/20 0830 (!) 99/44 -- -- 98 27 (!) 88 %   12/11/20 0815 (!) 96/45 -- -- 96 26 (!) 87 %   12/11/20 0811 -- -- -- -- 26 (!) 87 %   12/11/20 0807 -- -- -- 96 28 (!) 87 %   12/11/20 0800 (!) 99/45 98.6 °F (37 °C) CORE 94 23 90 %   12/11/20 0745 (!) 93/42 -- -- 91 26 97 %   12/11/20 0730 (!) 88/39 -- -- 90 24 99 %   12/11/20 0715 103/71 -- -- 93 26 99 %   12/11/20 0700 (!) 95/40 -- -- 95 28 99 %     No acute events overnight. Patient ventilated and sedated. Remains afebrile. Patient easily desaturating into 80s when moved. While on FiO2 100, Peep 20. Unable to get CT or LP due to patient increased need for ventilatory support and patient body habitus. LLE and abdominal cellulitis resolved  Afebrile. CRP elevated 474 - 510  WBC 13.5    Blood cultures 12/7  x2 + staph epidermidis    Summary of relevant labs:  Labs:  WBC 13-12-11-12 -11.5 - 13.4 - 9.9 - 12.5 - 13.7  Cr 1.3 -  1.61 - 1.98 - 2.14 - 2.04 - 2.07  Lactic 1.2 - 1.6   - 474 - 510  Total bili 1.31  Bili, direct 1.12    Micro:  Blood cx 12/11: no growth 4hrs x2  Blood cx 12/7: x2 + staph epidermidis  Blood cx 12/2 & 12/6 no growth  Sputum cx 12/6 neg    Respiratory pcr panel 12/2 COVID negative  Hepatitis panel negative  Histoplasma Ab, Ag  Cryptococcal   Legionella neg  Meningitis panel  CSF cx    Imaging:  CXR 12/11: Overall stable examination demonstrating diffuse airspace opacities throughout the lungs bilaterally. Support tubes and lines are in grossly unchanged positioning. CXR 12/10: No significant interval change from 12/09/2020. Mildly increased diffuse   opacities in the lungs when compared to 12/07/2020, possibly edema or pneumonia. CXR 12/9: Persistent bilateral pulmonary opacities could represent pulmonary edema or pneumonia        Liver US 12/9: Unremarkable right upper quadrant ultrasound. Echo 12/8: EF 45%. Leftward compression of   inter-ventricular septum (\"D-sign\") indicating RV pressure overload. Right atrium is severely dilated . Right ventricle is severely dilated with reduced systolic function. CXR 12/7: Worsening pulmonary edema and increased right pleural effusion. Underlying pneumonia is not excluded. I have personally reviewed the past medical history, past surgical history, medications, social history, and family history, and I haveupdated the database accordingly. Allergies:   Patient has no known allergies. Review of Systems:     Review of Systems   Unable to perform ROS: Intubated       Physical Examination :       Physical Exam  Constitutional:       General: He is not in acute distress. Appearance: He is obese. He is not ill-appearing. HENT:      Head: Normocephalic. Nose: Nose normal.      Mouth/Throat:      Comments: Orally intubated  Cardiovascular:      Rate and Rhythm: Normal rate and regular rhythm. Pulses: Normal pulses. Heart sounds: Normal heart sounds. Pulmonary:      Effort: Tachypnea present. Breath sounds: Wheezing present. Abdominal:      General: Bowel sounds are normal.      Palpations: Abdomen is soft. Genitourinary:     Comments: Johnson in, Sarahi urine  Skin:     General: Skin is warm and dry. Findings: No rash.    Neurological:      Comments: Calm on vent         Past Medical History:     Past Medical History:   Diagnosis Date    ADHD     BMI 70 and over, adult ([facility] [address] 12/2020    BMI 90    Cellulitis 12/2020    LLE    ERA (obstructive sleep apnea)     Right-sided heart failure [facility])        Past Surgical  History:   No past surgical history on file.     Medications:      metoclopramide  5 mg Intravenous Q6H    influenza virus vaccine  0.5 mL Intramuscular Prior to discharge    ceftaroline fosamil (TEFLARO) IVPB  600 mg Intravenous Q12H    levofloxacin  500 mg Intravenous Q24H    itraconazole  200 mg Oral TID    acyclovir  10 mg/kg (Ideal) Intravenous Q8H    midodrine  10 mg Oral 4x Daily    lidocaine PF  5 mL Nebulization Once    lansoprazole  30 mg Per NG tube QAM AC    sodium chloride flush  10 mL Intravenous 2 times per day       Social History:     Social History     Socioeconomic History    Marital status: Single     Spouse name: Not on file    Number of children: Not on file    Years of education: Not on file    Highest education level: Not on file   Occupational History    Not on file   Social Needs    Financial resource strain: Not on file    Food insecurity     Worry: Not on file     Inability: Not on file    Transportation needs     Medical: Not on file     Non-medical: Not on file   Tobacco Use    Smoking status: Not on file   Substance and Sexual Activity    Alcohol use: Not on file    Drug use: Not on file    Sexual activity: Not on file   Lifestyle    Physical activity     Days per week: Not on file     Minutes per session: Not on file    Stress: Not on file   Relationships    Social connections     Talks on phone: Not on file     Gets together: Not on file     Attends Sabianism service: Not on file     Active member of club or organization: Not on file     Attends meetings of clubs or organizations: Not on file     Relationship status: Not on file    Intimate partner violence     Fear of current or ex partner: Not on file     Emotionally abused: Not on file     Physically abused: Not on file     Forced sexual activity: Not on file   Other Topics Concern    Not on file   Social History Narrative    Not on file       Family History:   No family history on file. Medical Decision Making:   I have independently reviewed/ordered the following labs:    CBC with Differential:   Recent Labs     12/10/20  0449 12/11/20  0423   WBC 12.5* 13.7*   HGB 9.2* 9.5*   HCT 31.8* 32.0*    259   LYMPHOPCT 10* 11*   MONOPCT 8 7     BMP:  Recent Labs     12/10/20  0449 12/11/20  0423    139   K 4.5 4.0    99   CO2 29 28   BUN 81* 82*   CREATININE 2.04* 2.07*     Hepatic Function Panel:   Recent Labs     12/10/20  0449 12/11/20  0423   PROT 7.9 8.0   LABALBU 1.7* 1.7*   BILIDIR 0.90* 1.12*   IBILI 0.21 0.20   BILITOT 1.11 1.32*   ALKPHOS 97 90   ALT 63* 53*   AST 73* 51*     No results for input(s): RPR in the last 72 hours. No results for input(s): HIV in the last 72 hours. No results for input(s): BC in the last 72 hours. Lab Results   Component Value Date    CREATININE 2.07 12/11/2020    GLUCOSE 139 12/11/2020       Detailed results: Thank you for allowing us to participate in the care of this patient. Please call with questions. This note is created with the assistance of a speech recognition program.  While intending to generate adocument that actually reflects the content of the visit, the document can still have some errors including those of syntax and sound a like substitutions which may escape proof reading. It such instances, actual meaningcan be extrapolated by contextual diversion. [address] [place] [address]  Office: [phone]  Perfect serve / office [phone]    I have discussed the care of the patient, including pertinent history and exam findings,  with the student. I have seen and examined the patient and the key elements of all parts of the encounter have been performed by me.   I agree with the assessment, plan and orders as documented by the student    Denice Christine, Infectious Diseases

## 2020-12-11 NOTE — PLAN OF CARE
Problem: OXYGENATION/RESPIRATORY FUNCTION  Goal: Patient will maintain patent airway  12/10/2020 2011 by Robin Cullen RCP  Outcome: Ongoing     Problem: OXYGENATION/RESPIRATORY FUNCTION  Goal: Patient will achieve/maintain normal respiratory rate/effort  Description: Respiratory rate and effort will be within normal limits for the patient  12/10/2020 2011 by Robin Cullen RCP  Outcome: Ongoing     Problem: MECHANICAL VENTILATION  Goal: Patient will maintain patent airway  12/10/2020 2011 by Robin Cullen RCP  Outcome: Ongoing     Problem: MECHANICAL VENTILATION  Goal: Oral health is maintained or improved  12/10/2020 2011 by Robin Cullen RCP  Outcome: Ongoing     Problem: MECHANICAL VENTILATION  Goal: ET tube will be managed safely  12/10/2020 2011 by Robin Cullen RCP  Outcome: Ongoing     Problem: MECHANICAL VENTILATION  Goal: Ability to express needs and understand communication  12/10/2020 2011 by Robin Cullen RCP  Outcome: Ongoing     Problem: MECHANICAL VENTILATION  Goal: Mobility/activity is maintained at optimum level for patient  12/10/2020 2011 by Robin Cullen RCP  Outcome: Ongoing     Problem: Airway Clearance - Ineffective:  Goal: Ability to maintain a clear airway will improve  Description: Ability to maintain a clear airway will improve  12/10/2020 2011 by Robin Cullen RCP  Outcome: Ongoing     Problem: Aspiration:  Goal: Absence of aspiration  Description: Absence of aspiration  12/10/2020 2011 by Robin Cullen RCP  Outcome: Ongoing   BRONCHOSPASM/BRONCHOCONSTRICTION     [x]         IMPROVE AERATION/BREATH SOUNDS  [x]   ADMINISTER BRONCHODILATOR THERAPY AS APPROPRIATE  [x]   ASSESS BREATH SOUNDS  [x]   IMPLEMENT AEROSOL/MDI PROTOCOL  [x]   PATIENT EDUCATION AS NEEDED   PROVIDE ADEQUATE OXYGENATION WITH ACCEPTABLE SP02/ABG'S    [x]  IDENTIFY APPROPRIATE OXYGEN THERAPY  [x]   MONITOR SP02/ABG'S AS NEEDED   [x]   PATIENT EDUCATION AS NEEDED

## 2020-12-11 NOTE — PROGRESS NOTES
Attending Physician Statement  I have discussed the care of Elgin Guzman, including pertinent history and exam findings with the resident. I have reviewed the key elements of all parts of the encounter with the resident. I have seen and examined the patient with the resident. I agree with the assessment and plan and status of the problem list as documented. I seen the patient during my round today, I have reviewed the chart, overnight events noted, lab seen medications reviewed. He is afebrile and T-max is 98.6 in last 24 hours. He is continued on Levophed drip and this morning he was on 6 mcg of Levophed. He is on Versed drip off propofol and on fentanyl 50 mcg he is currently lethargic but according to nursing staff he does wake up intermittently and follows command. He continues to require high FiO2 100% PEEP of 20 and saturation around 90% although his saturation is no correlating with PO2 on arterial blood gas. He had received 2 doses of Lasix overnight 40 mg his creatinine this morning is 2.07 BUN is 82. His AST and ALT stable without much change, WBC count is 13.7 and has been stable. He is on heparin PTT is therapeutic, seen by vascular surgery no intervention per vascular surgery. Ventilator setting PRVC/34/450/20/100 percent and ABG 7.3 4/62/68/34. He does have high gastric residual and will start him on Reglan 5 mg every 6 hours. He is currently on free water his sodium is 139. Will need follow-up with nephrology and likely will need diuretics. Chest x-ray today shows bilateral infiltrate consistent with ARDS likely some overlying pulmonary edema. We will continue to monitor urine output renal function. We will continue with Levophed to keep systolic above 205 to 860. We will need to sedate him to decrease patient ventilator asynchrony and continue with current ventilator support.   Tidal volume was decreased from 5 30-4 50 because of airway pressure currently plateau pressure is 36 on current setting and increase respiratory rate to 34. Continue with heparin drip. Per infectious disease he is empirically on itraconazole along with acyclovir and ceftaroline. Discussed with nursing staff, treatment and plan discussed. Discussed with respiratory therapist.    Total critical care time caring for this patient with life threatening, unstable organ failure, including direct patient contact, management of life support systems, review of data including imaging and labs, discussions with other team members and physicians at least 28  Min so far today, excluding procedures. Please note that this chart was generated using voice recognition Dragon dictation software. Although every effort was made to ensure the accuracy of this automated transcription, some errors in transcription may have occurred.      Julio Thomas MD  12/11/2020 8:53 AM

## 2020-12-11 NOTE — PROGRESS NOTES
Spoke w/ pharmacist re: pt's Sporanox  Med is ordered in capsule form which contains beads/pellets that cannot be crushed. They've been causing issues with clogging the orogastric tube. Per pharmacy, we have a solution form of this medication but it is . Night PharmD will pass along to ordering staff so the solution can be obtained.

## 2020-12-11 NOTE — PROGRESS NOTES
Comprehensive Nutrition Assessment    Type and Reason for Visit:  Reassess    Nutrition Recommendations/Plan: Continue Tube Feeding at current rate at this time. Monitor TF tolerance and advance rate as able. Suggest new goal of 75 mL/hr now that pt is off propofol. Discontinue protein modulars. Nutrition Assessment:  Pt remains on vent. Propofol discontinued yesterday. Per RN, high gastric residuals last night and this morning. TF rate reduced to 25 mL/hr (from 45 mL/hr) this morning, per RN. Reglan initiated today. +BM yesterday. Pt continues to receive free water flushes; currently on 250 mL every 8 hours. Labs reviewed: Na improved, 139 mmol/L today. Malnutrition Assessment:  Malnutrition Status: At risk for malnutrition     Context:  Acute Illness     Findings of the 6 clinical characteristics of malnutrition:  Energy Intake:  (unable to assess intake PTA; currently meeting est needs with nutrition support)  Weight Loss:  No significant weight loss since admission  Body Fat Loss:  No significant body fat loss     Muscle Mass Loss:  No significant muscle mass loss    Fluid Accumulation:  7 - Moderate to Severe Extremities, Generalized   Strength:  Not Performed    Estimated Daily Nutrient Needs:  Energy (kcal):  7021-9617 kcal/day; Weight Used for Energy Requirements:  Ideal     Protein (g):  146-183 g protein/d (2.0-2.5 g/kg IBW); Weight Used for Protein Requirements:  Ideal(73 kg)          Current Nutrition Therapies:    DIET TUBE FEED CONTINUOUS/CYCLIC NPO; Low Calorie High Protein; Nasogastric; Continuous; 45; 24  Current Tube Feeding (TF) Orders:  · Feeding Route: Nasogastric  · Formula: Low Calorie, High Protein  · Schedule: Continuous, currently at 25 mL/hr d/t high residuals  · Additives/Modulars: Protein(Proteinex BID)  · Current TF & Flush Orders Provides: Order for Low Yemi, High Pro formula at 45 mL/hr + Proteinex 2 go BID=1288 kcal and 147 g pro/day.   · Goal TF & Flush Orders Provides: Low Yemi, High Pro formula at 75 mL/hr =1800 kcal and 157 g pro/day    Additional Calorie Sources:   none    Anthropometric Measures:  · Height: 5' 9\" (175.3 cm)  · Current Body Weight: 612 lb 7 oz (277.8 kg)   · Admission Body Weight: 602 lb (273.1 kg)(12/7/20 first bed scale wt)    · Ideal Body Weight: 160 lbs; % Ideal Body Weight  383%   · BMI: 90.4  · BMI Categories: Obese Class 3 (BMI 40.0 or greater)       Nutrition Diagnosis:   · Inadequate oral intake related to impaired respiratory function as evidenced by NPO or clear liquid status due to medical condition, nutrition support - enteral nutrition    Nutrition Interventions:   Food and/or Nutrient Delivery:  Modify Tube Feeding-continue TF at current rate at this time. Monitor TF tolerance and advance rate as able. Suggest new goal of 75 mL/hr now that pt is off propofol. Discontinue protein modulars. Nutrition Education/Counseling:  No recommendation at this time   Coordination of Nutrition Care:  Continue to monitor while inpatient    Goals:  EN intake to meet % of estimated nutrition needs -progress towards goal declining      Nutrition Monitoring and Evaluation:   Behavioral-Environmental Outcomes:  None Identified   Food/Nutrient Intake Outcomes:  Enteral Nutrition Intake/Tolerance  Physical Signs/Symptoms Outcomes:  Biochemical Data, GI Status, Nausea or Vomiting, Fluid Status or Edema, Nutrition Focused Physical Findings, Skin, Weight     Discharge Planning:     Too soon to determine     Electronically signed by Monique Grover RD, LD, [address]  on 12/11/20 at 3:32 PM EST    Contact: [phone]

## 2020-12-11 NOTE — PROGRESS NOTES
INTENSIVE CARE UNIT  Resident Physician Progress Note    Patient - Mel Mock  Date of Admission -  2020  4:33 AM  Date of Evaluation -  2020  Room and Bed Number -  0105/0105-01   Hospital Day - 9      SUBJECTIVE:     OVERNIGHT EVENTS:      Patient seen and examined. Patient slightly desaturated overnight, Although PaO2 on ABGs is 83, not a good wave form, keep similar vent settings for now. High residuals, placed on Reglan 5 mg Q 6. ID has a started him on ceftaroline, acyclovir and itraconazole and consulted IR for LP. Off of vancomycin and Zosyn. Urine output 2.2 L in 24-hour  AST and ALT trending down. Right upper quadrant ultrasound and acute hepatic panel nonsignificant.       TODAY:      AWAKE & FOLLOWING COMMANDS:  [x] No   [x] Yes    SECRETIONS Amount:  [] Small [] Moderate  [] Large  [x] None  Color:     [] White [] Colored  [] Bloody    SEDATION:  RAAS Score:  [x] Propofol gtt  [] Versed gtt  [] Ativan gtt   [] No Sedation    PARALYZED:  [x] No    [] Yes    VASOPRESSORS:  [] No    [] Yes  [x] Levophed [] Dopamine [] Vasopressin  [] Dobutamine [] Phenylephrine [] Epinephrine      OBJECTIVE:     VITAL SIGNS:  BP (!) 101/48   Pulse 95   Temp 98.6 °F (37 °C) (Core)   Resp (!) 34   Ht 5' 9\" (1.753 m)   Wt (!) 612 lb 7 oz (277.8 kg)   SpO2 90%   BMI 90.44 kg/m²   Tmax over 24 hours:  Temp (24hrs), Av.1 °F (37.3 °C), Min:98.6 °F (37 °C), Max:99.5 °F (37.5 °C)      Patient Vitals for the past 8 hrs:   BP Temp Temp src Pulse Resp SpO2 Weight   20 1136 -- -- -- 95 (!) 34 90 % --   20 1045 (!) 101/48 -- -- 90 (!) 31 91 % --   20 1030 (!) 102/54 -- -- 93 30 91 % --   20 1015 (!) 102/46 -- -- 94 29 90 % --   20 1000 (!) 103/45 -- -- 99 (!) 31 (!) 87 % --   20 0945 (!) 106/54 -- -- 99 (!) 31 (!) 87 % --   20 0930 (!) 107/49 -- -- 99 30 (!) 88 % --   20 0915 (!) 102/46 -- -- 99 25 (!) 86 % --   20 0900 (!) 98/49 -- -- 99 23 (!) 88 % --   12/11/20 0845 (!) 105/50 -- -- 99 28 (!) 89 % --   12/11/20 0830 (!) 99/44 -- -- 98 27 (!) 88 % --   12/11/20 0815 (!) 96/45 -- -- 96 26 (!) 87 % --   12/11/20 0811 -- -- -- -- 26 (!) 87 % --   12/11/20 0807 -- -- -- 96 28 (!) 87 % --   12/11/20 0800 (!) 99/45 98.6 °F (37 °C) CORE 94 23 90 % --   12/11/20 0745 (!) 93/42 -- -- 91 26 97 % --   12/11/20 0730 (!) 88/39 -- -- 90 24 99 % --   12/11/20 0715 103/71 -- -- 93 26 99 % --   12/11/20 0700 (!) 95/40 -- -- 95 28 99 % --   12/11/20 0645 (!) 111/52 -- -- 89 23 98 % --   12/11/20 0630 (!) 99/42 -- -- 90 28 99 % --   12/11/20 0615 (!) 111/49 -- -- 88 27 98 % --   12/11/20 0600 (!) 109/52 -- -- 88 28 96 % --   12/11/20 0545 (!) 105/47 -- -- 83 25 97 % --   12/11/20 0530 (!) 106/47 -- -- 95 24 97 % --   12/11/20 0515 (!) 106/47 -- -- 98 28 95 % --   12/11/20 0500 (!) 108/49 -- -- 100 26 (!) 82 % --   12/11/20 0452 -- -- -- -- -- -- (!) 612 lb 7 oz (277.8 kg)   12/11/20 0445 (!) 106/47 -- -- 96 24 94 % --   12/11/20 0430 (!) 108/45 -- -- 97 24 93 % --   12/11/20 0415 (!) 101/48 -- -- 98 28 94 % --         Intake/Output Summary (Last 24 hours) at 12/11/2020 1200  Last data filed at 12/11/2020 1100  Gross per 24 hour   Intake 3780.66 ml   Output 3895 ml   Net -114.34 ml     Date 12/11/20 0000 - 12/11/20 2359   Shift 0815-2419 4182-5593 8821-5927 24 Hour Total   INTAKE   I.V.(mL/kg) 312(1.1) 582(2.1)  894(3.2)   NG/GT(mL/kg) 334(1.2) 152(0.5)  486(1.7)   Shift Total(mL/kg) 646(2.3) 734(2.6)  1380(5)   OUTPUT   Urine(mL/kg/hr) 1065(0.5) 470  1535   Shift Total(mL/kg) 1065(3.8) 470(1.7)  1535(5.5)   Weight (kg) 277.8 277.8 277.8 277.8     Wt Readings from Last 3 Encounters:   12/11/20 (!) 612 lb 7 oz (277.8 kg)     Body mass index is 90.44 kg/m². PHYSICAL EXAM:  Constitutional: Intubated, sedated. Following commands off sedation.  Morbidly obese (BMI 86)  EENT: PERRLA, EOMI, sclera clear, anicteric, oropharynx clear  Neck: Large neck, supple, symmetrical, s  Humidification Source: Heated wire  Humidification Temp: 37  Humidification Temp Measured: 36.9  Circuit Condensation: Drained  Nitric Oxide/Epoprostenol In Use?: No  Additional Respiratory  Assessments  Pulse: 95  Resp: (!) 34  SpO2: 90 %  $End Tidal CO2: 41  Position: Semi-Moncada's  Humidification Source: Heated wire  Humidification Temp: 37  Circuit Condensation: Drained  Oral Care Completed?: Yes  Oral Care: Mouth swabbed, Mouth suctioned  Subglottic Suction Done?: Yes  Cuff Pressure (cm H2O): (mov)  Skin barrier applied: Yes    ABGs:   Lab Results   Component Value Date    NYR1DNT 35 12/11/2020    FIO2 100.0 12/11/2020         DATA:  Complete Blood Count:   Recent Labs     12/09/20  0449 12/10/20  0449 12/11/20  0423   WBC 9.9 12.5* 13.7*   RBC 3.21* 3.21* 3.25*   HGB 9.4* 9.2* 9.5*   HCT 31.8* 31.8* 32.0*   MCV 99.1 99.1 98.5   MCH 29.3 28.7 29.2   MCHC 29.6 28.9 29.7   RDW 15.8* 15.5* 15.6*   PLT See Reflexed IPF Result 227 259   MPV NOT REPORTED 12.8 12.5        Last 3 Blood Glucose:   Recent Labs     12/09/20  0449 12/10/20  0449 12/11/20  0423   GLUCOSE 124* 118* 139*        PT/INR:  No results found for: PROTIME, INR  PTT:    Lab Results   Component Value Date    APTT 62.4 12/11/2020       Comprehensive Metabolic Profile:   Recent Labs     12/09/20  0449 12/10/20  0449 12/11/20  0423    142 139   K 4.9 4.5 4.0    100 99   CO2 25 29 28   BUN 74* 81* 82*   CREATININE 2.14* 2.04* 2.07*   GLUCOSE 124* 118* 139*   CALCIUM 8.2* 8.3* 8.4*   PROT 7.6 7.9 8.0   LABALBU 1.5* 1.7* 1.7*   BILITOT 0.95 1.11 1.32*   ALKPHOS 93 97 90   * 73* 51*   ALT 65* 63* 53*      Magnesium: No results found for: MG  Phosphorus: No results found for: PHOS  Ionized Calcium: No results found for: CAION     Urinalysis:   Lab Results   Component Value Date    NITRU NEGATIVE 12/02/2020    COLORU DARK YELLOW 12/02/2020    PHUR 5.0 12/02/2020    WBCUA 5 TO 10 12/02/2020    RBCUA 0 TO 2 12/02/2020    MUCUS NOT REPORTED 12/02/2020    TRICHOMONAS NOT REPORTED 12/02/2020    YEAST NOT REPORTED 12/02/2020    BACTERIA NOT REPORTED 12/02/2020    SPECGRAV 1.021 12/02/2020    LEUKOCYTESUR SMALL 12/02/2020    UROBILINOGEN Normal 12/02/2020    BILIRUBINUR NEGATIVE 12/02/2020    GLUCOSEU NEGATIVE 12/02/2020    KETUA TRACE 12/02/2020    AMORPHOUS NOT REPORTED 12/02/2020       HgBA1c:  No results found for: LABA1C  TSH:  No results found for: TSH  Lactic Acid:   Lab Results   Component Value Date    LACTA NOT REPORTED 12/09/2020    LACTA NOT REPORTED 12/08/2020    LACTA NOT REPORTED 12/07/2020      Troponin: No results for input(s): TROPONINI in the last 72 hours. ASSESSMENT:     Patient Active Problem List    Diagnosis Date Noted    Acute on chronic right-sided heart failure (HCC)     Bacteremia due to coagulase-negative Staphylococcus     Arterial hypotension     Sepsis with acute hypercapnic respiratory failure and septic shock ([facility])     [facility]) [facility]     Hypernatremia     Cellulitis of left leg 12/02/2020    ERA (obstructive sleep apnea) 12/02/2020    Morbid obesity ([facility] 12/02/2020    Acute hypercap[facility] respiratory failure ([facility] 12/01/2020          PLAN:     WEAN PER PROTOCOL:  [] No   [x] Yes  [] N/A    ICU PROPHYLAXIS:  Stress ulcer:  [x] PPI Agent  [] [id number] [] Sucralfate  [] Other:  VTE:   [x] Enoxaparin  [] Unfract. Heparin Subcut  [] EPC Cuffs    NUTRITION:  [] NPO [x] Tube Feeding      [] TPN  [] PO    HOME MEDS RECONCILED: [x] No  [] Yes    CONSULTATION NEEDED:  [x] No  [] Yes    FAMILY UPDATED:    [] No  [] Yes    TRANSFER OUT OF ICU:   [x] No  [] Yes      Neuro:  · Sedated with propofol, following commands when off sedation.     Resp:  · Acute hypoxic hypercapnic respiratory failure: Currently worsening and requiring more of oxygen and pressure support. Covid negative. · Bronchoscopy done showing no mucous plugs. · Unable to get CT chest due to body habitus.   Strong suspicion for PE patient is on high-dose heparin. · Continue spontaneous breathing and awakening trial.      CV:  · Regular rate, rhythm. Pulses present. BP stable  · Troponinemia, likely demand ischemia. Echocardiogram done which showed descent along with decreased right and left systolic function with EF of 45%. Due to increase suspicion for PE patient is started on high-dose heparin. · Echo at [facility] - EF 55-60%.     ID:  · Sepsis. 2 blood cultures positive for GPC, coagulase-negative methicillin-resistant species. ID following, patient is started on ceftaroline, acyclovir and metronidazole. LLE cellulitis improving. · Cultures x2+ for methicillin-resistant especially. · Plan to do abdominal ultrasound today to look for abscess. Heme:  · Leukocytosis secondary to acute cellulitis, improving. · CBC daily     GI/Nutrition:  · Continue tube feeds at 70 mL/hour  · AST ALT trending down, right upper quadrant ultrasound nonsignificant. Renal:  · ELIECER, creatinine trending down 2.04.  · Renal ultrasound unremarkable     Prophylaxis:  · DVT:  High-dose heparin  · GI: protonix 40 mg daily     Dispo:  Currently staying in ICU. Prognosis guarded at this point. Jayson Gómez MD       Critical Care Resident PGY-3  12/11/2020, 12:06 PM       Attending Physician Statement  I have discussed the care of Dez Roblero, including pertinent history and exam findings with the resident. I have reviewed the key elements of all parts of the encounter with the resident. I have seen and examined the patient with the resident. I agree with the assessment and plan and status of the problem list as documented.     I seen the patient during my round today, I have reviewed the chart, overnight events noted, lab seen medications reviewed. He is afebrile and T-max is 98.6 in last 24 hours. He is continued on Levophed drip and this morning he was on 6 mcg of Levophed.   He is on Versed drip off propofol and on fentanyl 50 mcg he is currently lethargic but according to nursing staff he does wake up intermittently and follows command. He continues to require high FiO2 100% PEEP of 20 and saturation around 90% although his saturation is no correlating with PO2 on arterial blood gas. He had received 2 doses of Lasix overnight 40 mg his creatinine this morning is 2.07 BUN is 82. His AST and ALT stable without much change, WBC count is 13.7 and has been stable. He is on heparin PTT is therapeutic, seen by vascular surgery no intervention per vascular surgery. Ventilator setting PRVC/34/450/20/100 percent and ABG 7.3 4/62/68/34. He does have high gastric residual and will start him on Reglan 5 mg every 6 hours. He is currently on free water his sodium is 139. Will need follow-up with nephrology and likely will need diuretics. Chest x-ray today shows bilateral infiltrate consistent with ARDS likely some overlying pulmonary edema. We will continue to monitor urine output renal function. We will continue with Levophed to keep systolic above 418 to 462. We will need to sedate him to decrease patient ventilator asynchrony and continue with current ventilator support. Tidal volume was decreased from 5 30-4 50 because of airway pressure currently plateau pressure is 36 on current setting and increase respiratory rate to 34. Continue with heparin drip. Per infectious disease he is empirically on itraconazole along with acyclovir and ceftaroline.        Discussed with nursing staff, treatment and plan discussed.   Discussed with respiratory therapist.     Total critical care time caring for this patient with life threatening, unstable organ failure, including direct patient contact, management of life support systems, review of data including imaging and labs, discussions with other team members and physicians at least 28  Min so far today, excluding procedures.        Please note that this chart was generated using voice recognition Dragon dictation software.  Although every effort was made to ensure the accuracy of this automated transcription, some errors in transcription may have occurred.   Ene Carrera MD  12/11/2020 12:53 PM

## 2020-12-11 NOTE — PROGRESS NOTES
Overnight update:    Neuro:  Pt RASS -1, following commands and nodding head appropriately on Fentanyl 50mcg/hr and Versed 4mg/hr. Denies pain except when L foot is being touched. Hemodynamics:  Lupe very positional, often having dampened waveform and reading significantly lower than BP cuff. Titrating Levo based off of BP cuff overnight. Levo infusing at 8mcg/min most of the night, currently 6mcg/min. Afebrile. UOP: 1,065 mL overnight. Received 40mg Lasix around 5pm and at midnight. Nutrition:  Pt's 9pm residual 800mL. Dr Noah Alonzo notified. Received order to stop TF x4 hrs, give Reglan x1 and stop checking residuals. TF restarted at goal at 0100. Respiratory:  Spo2 more stable when pt is not being stimulated, but still desat's significantly when being put up in the lift, turned or suctioned. Lowest Spo2 69% overnight. Generally recovers quickly to the 80s then takes a while to fully recover to >88-90%. Bright red but thin/watery secretions noted from ETT. Heparin gtt has been therapeutic since yesterday evening.

## 2020-12-12 NOTE — DISCHARGE SUMMARY
[address] [place] [address]     Department of Internal Medicine - Critical Care Service    INPATIENT DISCHARGE SUMMARY      PATIENT IDENTIFICATION:  NAME:  Liudmila Smith   :   1985  MRN:    [id number]     Acct:    [de-identified]   Admit Date:  2020  Discharge date:  No discharge date for patient encounter. Attending Provider: Jenni Bennett MD                                     Active Problems:    Acute hypercapnic respiratory failure ([address]    Cellulitis of left leg    ERA (obstructive sleep apnea)    Morbid obesity (HCC)    ELIECER (acute kidney injury) ([facility] [address]    Hypernatremia    Arterial hypotension    Sepsis with acute hypercapnic respiratory failure and septic shock ([address]    Acute on chronic right-sided heart failure ([address]    Bacteremia due to coagulase-negative Staphylococcus  Resolved Problems:    * No resolved hospital problems. *       REASON FOR HOSPITALIZATION:   No chief complaint on file. Hospital Course  Liudmila Smith is a 37y.o.-year-old male with hx of ERA on BiPAP with morbid obesity presented to [facility] with complaint of pain and swelling to LLE on 2020. Patient had worsening respiratory status at [address] [facility] with respiratory acidosis requiring intubation and transfer to [facility].  Patient cellulitis got improved but respiratory status got worse. Due to his body habitus we are not able to get CT PE/VQ scan due to high suspicion of pulmonary embolism, patient was a started on high-dose heparin but he kept on requiring more ventilator support. Patient's picture was consistent with ARDS. Due to severe respiratory failure family change the CODE STATUS to DNR CC.   Patient was terminally extubated and he was pronounced death at 3; 27.     Consults:   ID and vascular surgery    Procedures:  Arterial line    Any Hospital Acquired Infections: no    PATIENT'S DISCHARGE CONDITION:   N/A    PATIENT/FAMILY INSTRUCTIONS: There are no discharge medications for this patient. Activity: N/A    Diet:  N/A    Disposition: N/A    Follow-up:  N/A    Electronically signed by Bobbi Vizcarra MD on 12/12/2020 at 4:42 PM     Time Spent on discharge is more than 20 minutes in the examination, evaluation, counseling and review of medications and discharge plan. Attending Physician Statement  I have discussed the care of Patel Mercado, including pertinent history and exam findings with the resident. I have reviewed the key elements of all parts of the encounter with the resident. I agree with the assessment and plan and status of the problem list as documented.     Please see critical care progress note 12/12/2020    Erika Marcelino MD  12/12/2020 9:18 PM

## 2020-12-12 NOTE — PROGRESS NOTES
INTENSIVE CARE UNIT  Resident Physician Progress Note    Patient - Ria Bartlett  Date of Admission -  12/2/2020  4:33 AM  Date of Evaluation -  12/12/2020  Room and Bed Number -  0105/0105-01   Hospital Day - 10      SUBJECTIVE:     OVERNIGHT EVENTS:      Patient seen and examined. ABGs at 4 am showing hypercarbia. Tidal volume increased to 8 ml/kg. Mode of ventilation changed to Pressure control with PEEP of 20 and PIP of 46. Will try epoprostenerol. 7.29/68/70.9/33.1    Repeat blood gas at noon. Had a long discussion with family regarding goals of care. Patient still lives with his parents and his mom and dad and sister are involved in decision making. Does not have any advanced goals of care or POA. Explained to the patient's family that he is currently in severe ARDS secondary to pneumonia and pulmonary edema exacerbated by severe obstructive sleep apnea/OHS syndrome and is requiring 100% oxygen and on high pressure support and still not maintaining oxygen. Also explained to them different CODE STATUS and given the option to involve palliative care. Patient's family receptive to palliative care and states that they will have a discussion and follow-up with us. RN Chasidy and Babita present at time of conversation. ID has a started him on ceftaroline, acyclovir and itraconazole and consulted IR for LP. Off of vancomycin and Zosyn. Urine output 2.2 L in 24-hour  AST and ALT trending down. Right upper quadrant ultrasound and acute hepatic panel nonsignificant.       TODAY:      AWAKE & FOLLOWING COMMANDS:  [] No   [x] Yes    SECRETIONS Amount:  [] Small [] Moderate  [] Large  [x] None  Color:     [] White [] Colored  [] Bloody    SEDATION:  RAAS Score:  [x] Propofol gtt  [] Versed gtt  [] Ativan gtt   [] No Sedation    PARALYZED:  [x] No    [] Yes    VASOPRESSORS:  [] No    [] Yes  [x] Levophed [] Dopamine [] Vasopressin  [] Dobutamine [] Phenylephrine [] Epinephrine      OBJECTIVE: VITAL SIGNS:  BP (!) 102/41   Pulse 92   Temp 97.9 °F (36.6 °C) (Bladder)   Resp (!) 34   Ht 5' 9\" (1.753 m)   Wt (!) 615 lb 1.3 oz (279 kg)   SpO2 (!) 85%   BMI 90.83 kg/m²   Tmax over 24 hours:  Temp (24hrs), Av.3 °F (36.8 °C), Min:97.9 °F (36.6 °C), Max:98.6 °F (37 °C)      Patient Vitals for the past 8 hrs:   BP Temp Temp src Pulse Resp SpO2 Height Weight   20 0726    92 (!) 34 (!) 85 % 5' 9\" (1.753 m)    20 0715 (!) 102/41   88 (!) 34 (!) 86 %     20 0700 (!) 106/48   85 (!) 34 (!) 85 %     20 0645 (!) 111/54   86 (!) 33 (!) 86 %     20 0615 (!) 106/54   85 (!) 34 90 %     20 0600 118/65   85 30 91 %  (!) 615 lb 1.3 oz (279 kg)   20 0545 (!) 117/56   87 (!) 34 91 %     20 0530 116/60   88 (!) 33 91 %     20 0520    90 (!) 35 91 %     20 0515 116/61   92 (!) 32 91 %     20 0500 (!) 111/54   96 23 (!) 89 %     20 0445 (!) 110/56   97 26 (!) 89 %     20 0430 (!) 112/56   96 28 (!) 89 %     20 0415 (!) 109/51   96 25 (!) 89 %     20 0400 (!) 101/53 97.9 °F (36.6 °C) Bladder 93 29 (!) 89 %     20 0345 (!) 105/52   94 27 90 %     20 0330 (!) 111/56   95 20 (!) 89 %     20 0315 (!) 108/56   96 29 90 %     20 0300 (!) 110/54   96 23 90 %     20 0245 (!) 113/52   95 22 (!) 88 %     20 0230 (!) 105/51   94 27 (!) 89 %     20 0215 (!) 107/49   95 26 (!) 89 %     20 0200 (!) 107/51   97 27 (!) 89 %     20 0145 (!) 105/47   99 23 (!) 88 %     20 0130 (!) 108/52   99 27 (!) 89 %     20 0115 (!) 103/49   100 23 (!) 88 %     20 0100 (!) 99/48   101 28 (!) 87 %     20 0045 (!) 104/47   102 22 (!) 86 %     20 0030 (!) 102/45   101 26 (!) 86 %           Intake/Output Summary (Last 24 hours) at 2020 6086 Last data filed at 12/12/2020 0600  Gross per 24 hour   Intake 3439.65 ml   Output 2510 ml   Net 929.65 ml     Date 12/12/20 0000 - 12/12/20 2359   Shift 1095-9444 1831-4189 3751-5639 24 Hour Total   INTAKE   I.V.(mL/kg) 809(2.9)   809(2.9)   NG/GT(mL/kg) 311(1.1)   311(1.1)   Shift Total(mL/kg) 1120(4)   1120(4)   OUTPUT   Urine(mL/kg/hr) 905(0.4)   905   Shift Total(mL/kg) 905(3.2)   905(3.2)   Weight (kg) 279 279 279 279     Wt Readings from Last 3 Encounters:   12/12/20 (!) 615 lb 1.3 oz (279 kg)     Body mass index is 90.83 kg/m². PHYSICAL EXAM:  Constitutional: Intubated, sedated. Following commands off sedation. Morbidly obese (BMI 86)  EENT: PERRLA, EOMI, sclera clear, anicteric, oropharynx clear  Neck: Large neck, supple, symmetrical, trachea midline, no adenopathy, thyroid symmetric  Respiratory: Intubated, equal air entry bilaterally, no rales, rhonchi  Cardiovascular: regular rate and rhythm, normal S1, S2, no murmur noted, pulses identified with Doppler   Abdomen: Cellulitis improving  Neurological: Sedated  Extremities: Left lower extremity swelling which is improving.     MEDICATIONS:  Scheduled Meds:   metoclopramide  5 mg Intravenous Q6H    influenza virus vaccine  0.5 mL Intramuscular Prior to discharge    ceftaroline fosamil (TEFLARO) IVPB  600 mg Intravenous Q12H    levofloxacin  500 mg Intravenous Q24H    itraconazole  200 mg Oral TID    acyclovir  10 mg/kg (Ideal) Intravenous Q8H    midodrine  10 mg Oral 4x Daily    lidocaine PF  5 mL Nebulization Once    lansoprazole  30 mg Per NG tube QAM AC    sodium chloride flush  10 mL Intravenous 2 times per day     Continuous Infusions:   midazolam 3 mg/hr (12/12/20 0331)    fentaNYL 50 mcg/hr (12/12/20 0107)    norepinephrine 5 mcg/min (12/12/20 4492)    heparin (PORCINE) Infusion 17 Units/kg/hr (12/12/20 0736)    propofol Stopped (12/10/20 1750)     PRN Meds:       albuterol, 2.5 mg, Q6H PRN   heparin (porcine), 10,000 Units, PRN      heparin (porcine), 5,000 Units, PRN      acetaminophen, 650 mg, Q4H PRN    Or      acetaminophen, 650 mg, Q4H PRN      sodium chloride flush, 10 mL, PRN      polyethylene glycol, 17 g, Daily PRN      promethazine, 12.5 mg, Q6H PRN    Or      ondansetron, 4 mg, Q6H PRN        SUPPORT DEVICES: [x] Ventilator [] BIPAP  [] Nasal Cannula [] Room Air    VENT SETTINGS (Comprehensive) (if applicable):    Vent Information  $Ventilation: $Subsequent Day  Skin Assessment: Clean, dry, & intact  Suction Catheter Diameter: 14  Equipment ID: [id number]  Equipment Changed: Suction catheter  Vent Type: Servo i  Vent Mode: PRVC  Vt Ordered: 530 mL  Rate Set: 34 bmp  Pressure Support: 10 cmH20  FiO2 : 100 %  SpO2: (!) 85 %  SpO2/FiO2 ratio: 85  PaO2/FiO2 ratio: 88  Sensitivity: 5  PEEP/CPAP: 20  I Time/ I Time %: 0.65 s  Humidification Source: Heated wire  Humidification Temp: 37  Humidification Temp Measured: 37.2  Circuit Condensation: Drained  Nitric Oxide/Epoprostenol In Use?: No  Additional Respiratory  Assessments  Pulse: 92  Resp: (!) 34  SpO2: (!) 85 %  $End Tidal CO2: 33  Position: Semi-Moncada's  Humidification Source: Heated wire  Humidification Temp: 37  Circuit Condensation: Drained  Oral Care Completed?: Yes  Oral Care: Teeth brushed  Subglottic Suction Done?: Yes  Cuff Pressure (cm H2O): (MOV)  Skin barrier applied: Yes    ABGs:   Lab Results   Component Value Date    RZD0ASM 35 12/12/2020    FIO2 100.0 12/12/2020         DATA:  Complete Blood Count:   Recent Labs     12/10/20  0449 12/11/20  0423 12/12/20  0455   WBC 12.5* 13.7* 14.2*   RBC 3.21* 3.25* 3.21*   HGB 9.2* 9.5* 9.1*   HCT 31.8* 32.0* 31.7*   MCV 99.1 98.5 98.8   MCH 28.7 29.2 28.3   MCHC 28.9 29.7 28.7   RDW 15.5* 15.6* 15.6*    259 271   MPV 12.8 12.5 12.0        Last 3 Blood Glucose:   Recent Labs     12/10/20  0449 12/11/20  0423 12/12/20  0455   GLUCOSE 118* 139* 149*  Morbid obesity ([facility] 12/02/2020    Acute hypercapnic respiratory failure ([facility] 12/01/2020          PLAN:     WEAN PER PROTOCOL:  [] No   [x] Yes  [] N/A    ICU PROPHYLAXIS:  Stress ulcer:  [x] PPI Agent  [] M2Idjsr [] Sucralfate  [] Other:  VTE:   [x] Enoxaparin  [] Unfract. Heparin Subcut  [] EPC Cuffs    NUTRITION:  [] NPO [x] Tube Feeding      [] TPN  [] PO    HOME MEDS RECONCILED: [x] No  [] Yes    CONSULTATION NEEDED:  [x] No  [] Yes    FAMILY UPDATED:    [] No  [] Yes    TRANSFER OUT OF ICU:   [x] No  [] Yes      Neuro:  · Sedated with propofol, following commands when off sedation.     Resp:  · Acute hypoxic hypercapnic respiratory failure: Currently worsening and requiring more of oxygen and pressure support. Covid negative. · Bronchoscopy done showing no mucous plugs. · Unable to get CT chest due to body habitus. · Continue spontaneous breathing and awakening trial.      CV:  · Regular rate, rhythm. Pulses present. BP stable  · Troponinemia, likely demand ischemia. Echocardiogram done which showed decreased right and left systolic function with EF of 45%. Due to increase suspicion for PE patient is started on high-dose heparin. · Echo at [facility] - EF 55-60%.     ID:  · Sepsis. 2 blood cultures positive for GPC, coagulase-negative methicillin-resistant species. ID following, patient is started on ceftaroline, acyclovir and metronidazole. LLE cellulitis improving. · Cultures x2+ for methicillin-resistant especially. · Plan to do abdominal ultrasound today to look for abscess. Heme:  · Leukocytosis secondary to acute cellulitis, improving. · CBC daily     GI/Nutrition:  · Continue tube feeds at 70 mL/hour  · AST ALT trending down, right upper quadrant ultrasound nonsignificant.     Renal:  · ELIECER, creatinine trending down 2.04.  · Renal ultrasound unremarkable     Prophylaxis:  · DVT:  High-dose heparin  · GI: protonix 40 mg daily     Dispo: Currently staying in ICU. Prognosis guarded at this point. William Miranda MD       Critical Care Resident PGY-3  12/12/2020, 8:22 AM     Attending Physician Statement  I have discussed the care of Joel Shaver, including pertinent history and exam findings with the resident. I have reviewed the key elements of all parts of the encounter with the resident. I have seen and examined the patient with the resident. I agree with the assessment and plan and status of the problem list as documented. I have seen the patient during my round today, I have reviewed the chart, labs and medications reviewed arterial blood gases seen. He continues to require Levophed support to maintain blood pressure. He is on Versed drip and fentanyl drip most of the time he is managing with the ventilator. He continues to be refractory hypoxic with requiring 100% FiO2 and PEEP of 20 and is still desaturating to 85%. Ventilator adjustments were done and vent settings were tried he was also tried on pressure control. His peak airway pressure remains high 45-46 and tidal volume was 450 initially the entire body was increased to 530 back again this morning initial blood gas this morning was 7.2 9/68/71/33 histology was increased and repeat blood gas was 7.34/58.6/57. He was placed on pressure control with pressure control of 26 PEEP of 20 peak pressure of 40 6 repeat ABG was 7.3 3/64/52/34. Chest x-ray shows bilateral infiltrate n.p.o. to hydration chest x-ray consistent with ARDS and he continues to be in severe ARDS with continued hypoxia. His urine output is good his creatinine is 2.07 his BUN increased to 90 bicarbonate is 28 he did not receive Lasix overnight. He is on heparin drip empirically as he has severe right heart failure and right ventricular volume overload likely acute on chronic and chronic right heart failure secondary to obesity hypoventilation and chronic hypoxia. I have discussed with mother and sister in detail, there has been discussion this morning also with critical care resident as mother wanted update they will explain the current condition with severe hypoxia, ARDS, worsening hypoxemia requirement of Levophed and right heart failure. Family wanted to withdraw support and want to make patient comfort care. They have family meeting with mother sister and father on the phone apparently he is blind and father with mother wanted to withdraw support. Mother has arrived along with sister I had a long discussion with mother and sister. They told me that he is not very active at home he lives with parents, his most of the time sedentary he gets short of breath by mild activity he has been gaining enormous amount of weight, they have also told me that he has ADHD he does not understand the disease and consequences. They wanted to withdraw support I explained to them in simple language current condition and ventilatory support continued hypoxia and about ARDS mother had discussed with father and they both want to withdraw support, sister told me that there are other 3 siblings and they have all discussed there are likely along with father and mother and both parents want to withdraw support. I have discussed with him about CODE STATUS DNRCC arrest no CPR shock and option of continuing current support and if worsening then to do comfort care but they do not think that he has good quality of life or understanding of the disease discussed and condition they wanted to just provide comfort care and symptom control taking off ventilatory support understanding that that likely will result in death. I have called father on the phone and discussed with him and he understand his current condition and want to take him off supportive care and ventilatory support. Will follow parents and family wishes and provide comfort care change CODE STATUS to DNR CC. We will discontinue drip discussed with nursing staff that drip has to be discontinued and will write medication for comfort care and symptom control      Discussed with nursing staff, treatment and plan discussed.   Discussed with respiratory therapist. Total critical care time caring for this patient with life threatening, unstable organ failure, including direct patient contact, management of life support systems, review of data including imaging and labs, discussions with other team members and physicians at least 48  Min so far today, excluding procedures. Please note that this chart was generated using voice recognition Dragon dictation software. Although every effort was made to ensure the accuracy of this automated transcription, some errors in transcription may have occurred.      Lieutenant Cathy MD  12/12/2020 11:46 AM

## 2020-12-12 NOTE — FLOWSHEET NOTE
SPIRITUAL CARE PROGRESS NOTE    Spiritual Assessment:  was requested to return and provide spiritual support to family after decision was made by family to withdrawal patient from life support. Intervention:   returned at [phone] and provided emotional and spiritual support to family during withdrawal of life support.  also summoned relief  to also be present to assist family due to  shift change at 1630. Chaplains provided prayer to family with relief  taking over for  at 8048. Outcome: Relief  arrived and will continue to provide support to family.       12/12/20 1655   Encounter Summary   Services provided to: Family   Referral/Consult From: Nurse   Continue Visiting   (12/12/2020)   Complexity of Encounter High   Length of Encounter 30 minutes   Spiritual Assessment Completed Yes        Electronically signed by Roseann Corley on 12/12/2020 at 4:54 PM.  [address] [employer]  [phone]

## 2020-12-12 NOTE — PROGRESS NOTES
I had a long discussion with the patient's mom and sister in person with Dad on the telephone with critical care attending Dr Savanna Mares, in presence of the RN Tim taking care of the patient. Patient lives with his parents, does not have wife or children. As per the family, patient had some ADHD and is developmentally delayed and does not have any advanced directives or POA. Patient's current clinical condition, laboratory and radiographic findings as well as recommendations of physicians consulted on the case were discussed with the patient's family in detail. All questions and concerns of family were addressed, and appropriate emotional support was provided. Explained to the patient's family that he is currently in severe ARDS secondary to pneumonia and pulmonary edema exacerbated by severe obstructive sleep apnea/OHS syndrome and is requiring 100% oxygen and on high pressure support and still not maintaining oxygen. Also explained to them different CODE STATUS and given the option to involve palliative care. Patient's family stated that he has other siblings too who are consistently being updated and helping the family in making decisions at this time. Patient mom and dad understand that they are the decision makers in absence of any advanced directives or POA. After understanding patient's current medical condition, family decided that did not want any lab draws or treatments aimed at prolonging the life of patient, at the cost of patient's overall comfort. They expressed their wishes to make the patient comfortable, stop all lab draws and not to do any resuscitative procedures on the patient. They requested patient's code status to be changed to [facility] which was witnessed by RN, Avtar Fishman. Will honor family's wishes, and will change patient's code status to [facility].         Juan Hillman MD       Critical Care Resident PGY-3  12/12/2020, 3:28 PM    Attending Physician Statement I have discussed the care of Monica Zavala, including pertinent history and exam findings with the resident. I have reviewed the key elements of all parts of the encounter with the resident. I have seen and examined the patient with the resident. I agree with the assessment and plan and status of the problem list as documented. I had a long discussion with the family about patient condition, options of treatment and CODE STATUS.   Please see separate critical care progress note today    Yolanda Handy MD  12/12/2020 4:59 PM

## 2020-12-12 NOTE — DEATH NOTES
Death Pronouncement Note  Patient's Name: Mel Mock   Patient's YOB: 1985  MRN Number: [id number]    Admitting Provider: Mayra Caba MD  Attending Provider: Mayra Caba MD    Patient was examined and the following were absent: Pulses, Blood Pressure and Respiratory effort    I declared the patient dead on  at 4.33    Preliminary Cause of Death:  Respiratory failure     Electronically signed by Montez Chu MD on 12/12/20 at 4:41 PM EST

## 2020-12-12 NOTE — FLOWSHEET NOTE
SPIRITUAL CARE PROGRESS NOTE    Spiritual Assessment:  was requested to provide emotional and spiritual support to family of patient considering to have patient placed on comfort care.  encountered family in consultation room where he introduced himself and his role. Patient's mother and sister were present. Family shared reasoning behind health issues of patient leading up to his hospitalization. Family said they were leaning towards having patient placed on comfort care. Family was tearful, but emotionally stable and was open about their feelings surrounding patient's health. Family identifies as being [employer] and mother of patient shared she and family have spiritual support as well as additional family support. Intervention:  was an emotional and spiritual presence to family and engaged family in their feelings surrounding patient's health. Upon request,  provided prayer to family and patient.  also informed family he would follow up with family prior to the end of the shift and inform  coming on duty of situation should they or nursing staff desire additional spiritual support. Outcome: Chaplains will remain available to offer spiritual and emotional support as needed. 12/12/20 1426   Encounter Summary   Services provided to: Family   Referral/Consult From: Nurse   Support System Family members   Continue Visiting   (12/12/2020)   Complexity of Encounter High   Length of Encounter 1 hour;15 minutes   Spiritual Assessment Completed Yes   Crisis   Type Emotional distress   Assessment Approachable;Calm; Anxious; Coping   Intervention Explored feelings, thoughts, concerns; Active listening;Prayer;Nurtured hope;End of life care   Outcome Receptive;Coping;Expressed gratitude   Spiritual/Voodoo   Type Spiritual support   Assessment Approachable;Calm;Coping   Intervention Explored feelings, thoughts, concerns; Active listening Outcome Receptive; Expressed gratitude       Electronically signed by Bayron Larios, on 12/12/2020 at 2:34 PM.  [address] [facility]  [phone]

## 2020-12-12 NOTE — PROGRESS NOTES
[facility] -   Infectious diseases evaluation  admission date 12/2/2020    reason for consultation:   LLE cellulitis    Impression :   Current:  · LLE Cellulitis resolved  · abd wall cellulitis resolved  · Acute hypoxic hypercapnic respiratory failure  · Mild Leucocytosis  · Persistent fever -   · CRP elevation  · pulm mucous plugs, removed by bronch -BAL cx neg  · bacteremia x 2 2/17   · Multifocal pneumonia vs inters pneumonia   · CRISS 12/9  · Staph epi double bacteremia 12/7  · Morbid obesity    Discussion / summary of stay / plan of care   · Presents with bilateral lower extremity and lower abdomen cellulitis and picture of sepsis with respiratory failure  · Cellulitis improved but the patient has multiple mucous plug which necessitated endoscopy  · Fever persistent despite Zosyn and Vanco  · 12/9 Fever improved after antibiotic broadened with itraconazole and acyclovir but, CRP on the rise - LP, ultrasound of the abdomen, CT abdomen chest ,  not feasible due to size  · Patient remains with negative sputum culture, very high oxygen requirement, leukocytosis and elevated CRP, no clear diagnosis  · Blood cultures asked to from 12/7 staph epidermidis, unclear significance, repeat blood cultures negative  Recommendations   post zosyn and vanco   12/9 switched to ceftaroline, Levaquin, acyclovir, itraconazole  12/9 CXR suggesting a multifocal inters pneumonia for the first time.   · pend histoplasma and mycoplasma  · Blood cx 12/7: x2 + staph epidermidis, ?significance  · Blood cx 12/6 no growth, BC 12/11 negative so far  · itracon level on 12/12, pend histo WEBER      Infection Control Recommendations   · Leslie Precautions    Antimicrobial Stewardship Recommendations   · Simplification of therapy  · Targeted therapy  Coordination ofOutpatient Care:   · Estimated Length of IV antimicrobials:  · Patient will need Midline / picc Catheter Insertion: 12/12/20 0500 (!) 111/54   96 23 (!) 89 %     12/12/20 0445 (!) 110/56   97 26 (!) 89 %     12/12/20 0430 (!) 112/56   96 28 (!) 89 %     12/12/20 0415 (!) 109/51   96 25 (!) 89 %     12/12/20 0400 (!) 101/53 97.9 °F (36.6 °C) Bladder 93 29 (!) 89 %     12/12/20 0345 (!) 105/52   94 27 90 %     12/12/20 0330 (!) 111/56   95 20 (!) 89 %     12/12/20 0315 (!) 108/56   96 29 90 %     12/12/20 0300 (!) 110/54   96 23 90 %     12/12/20 0245 (!) 113/52   95 22 (!) 88 %       Patient ventilated and sedated. Remains afebrile. Patient desaturates for long periods of time when moved. , while on FiO2 100, Peep 20. Patient having large watery BM, FMS placed. Producing red sputum. WBC 14.2    LLE and abdominal cellulitis resolved    Blood cultures 12/7  x2 + staph epidermidis    Summary of relevant labs:  Labs:  WBC 13-12-11-12 -11.5 - 13.4 - 9.9 - 12.5 - 13.7 - 14.2  Cr 1.3 -  1.61 - 1.98 - 2.14 - 2.04 - 2.07 - 2.07  Lactic 1.2 - 1.6   - 474 - 510  Total bili 1.31  Bili, direct 1.12  Itraconazole level - pending    Micro:  Blood cx 12/11: no growth 20hrs x2  Blood cx 12/7: x2 + staph epidermidis  Blood cx 12/2 & 12/6 no growth  Sputum cx 12/6 neg  Legionella neg  Respiratory pcr panel 12/2 COVID negative  Hepatitis panel negative    Histoplasma Ab, Ag  Cryptococcal   Meningitis panel  CSF cx    Imaging:  CXR 12/11: Overall stable examination demonstrating diffuse airspace opacities throughout the lungs bilaterally. Support tubes and lines are in grossly unchanged positioning. CXR 12/10: No significant interval change from 12/09/2020. Mildly increased diffuse   opacities in the lungs when compared to 12/07/2020, possibly edema or pneumonia. CXR 12/9: Persistent bilateral pulmonary opacities could represent pulmonary edema or pneumonia        Liver US 12/9: Unremarkable right upper quadrant ultrasound. Echo 12/8: EF 45%.  Leftward compression of  acyclovir  10 mg/kg (Ideal) Intravenous Q8H    midodrine  10 mg Oral 4x Daily    lidocaine PF  5 mL Nebulization Once    lansoprazole  30 mg Per NG tube QAM AC    sodium chloride flush  10 mL Intravenous 2 times per day       Social History:     Social History     Socioeconomic History    Marital status: Single     Spouse name: Not on file    Number of children: Not on file    Years of education: Not on file    Highest education level: Not on file   Occupational History    Not on file   Social Needs    Financial resource strain: Not on file    Food insecurity     Worry: Not on file     Inability: Not on file    Transportation needs     Medical: Not on file     Non-medical: Not on file   Tobacco Use    Smoking status: Not on file   Substance and Sexual Activity    Alcohol use: Not on file    Drug use: Not on file    Sexual activity: Not on file   Lifestyle    Physical activity     Days per week: Not on file     Minutes per session: Not on file    Stress: Not on file   Relationships    Social connections     Talks on phone: Not on file     Gets together: Not on file     Attends [employer] service: Not on file     Active member of club or organization: Not on file     Attends meetings of clubs or organizations: Not on file     Relationship status: Not on file    Intimate partner violence     Fear of current or ex partner: Not on file     Emotionally abused: Not on file     Physically abused: Not on file     Forced sexual activity: Not on file   Other Topics Concern    Not on file   Social History Narrative    Not on file       Family History:   No family history on file.    Medical Decision Making:   I have independently reviewed/ordered the following labs:    CBC with Differential:   Recent Labs     12/11/20 0423 12/12/20  0455   WBC 13.7* 14.2*   HGB 9.5* 9.1*   HCT 32.0* 31.7*    271   LYMPHOPCT 11* 11*   MONOPCT 7 6     BMP:  Recent Labs     12/11/20 0423 12/12/20  0455    138 K 4.0 4.1   CL 99 98   CO2 28 28   BUN 82* 90*   CREATININE 2.07* 2.07*     Hepatic Function Panel:   Recent Labs     12/10/20  0449 12/11/20  0423   PROT 7.9 8.0   LABALBU 1.7* 1.7*   BILIDIR 0.90* 1.12*   IBILI 0.21 0.20   BILITOT 1.11 1.32*   ALKPHOS 97 90   ALT 63* 53*   AST 73* 51*     No results for input(s): RPR in the last 72 hours. No results for input(s): HIV in the last 72 hours. No results for input(s): BC in the last 72 hours. Lab Results   Component Value Date    CREATININE 2.07 12/12/2020    GLUCOSE 149 12/12/2020       Detailed results: Thank you for allowing us to participate in the care of this patient. Please call with questions. This note is created with the assistance of a speech recognition program.  While intending to generate adocument that actually reflects the content of the visit, the document can still have some errors including those of syntax and sound a like substitutions which may escape proof reading. It such instances, actual meaningcan be extrapolated by contextual diversion. [address] [place] [address]  Office: [phone]  Perfect serve / office [phone]    ATTESTATION:    I have discussed the case, including pertinent history and exam findings with the residents and students. I have seen and examined the patient and the key elements of the encounter have been performed by me. I was present when the student obtained his information or examined the patient. I have reviewed the laboratory data, other diagnostic studies and discussed them with the residents. I have updated the medical record where necessary. I agree with the assessment, plan and orders as documented by the resident/ student.     Jason Rose MD.

## 2020-12-12 NOTE — PROGRESS NOTES
Overnight update:     Neuro:  Versed decreased from 5mg/hr to 3mg/hr overnight d/t increasing sedation. Breathing synchronously with the vent at this time. Fentanyl continues at 50mcg/hr. (Sporonox potentiates these medications)      Hemodynamics:  Levo at 5mcg/min, titrating per BP cuff. [place] out during the day. Afebrile.     UOP: 1,370mL out overnight.     Nutrition:  Residual 400mL at 0245. Decreased TF from 50 back to 25mL/hr. Continuing with Reglan Q6H.      Respiratory:  Spo2 less stable tonight. Pt had to be fully turned and cleaned twice during the day d/t BMs and pt requires hours for his Spo2 to recover to 88-90%. FMS placed to help avoid having to do full, sustained turns. Using MaxiSky to provide appropriate turning with wedge and pillow support. Continues to have bright red but thin/watery secretions  from ETT. Heparin gtt rate increased this AM d/t not being therapeutic.

## 2020-12-12 NOTE — FLOWSHEET NOTE
Patient was switched to a [facility] per family requests to withdraw care. 1615 pt was terminally extubated   1633 pt was pulseless, no respirations, and asystole on the monitor.   Time of death pronounced by Dr. Be Bay

## 2020-12-12 NOTE — FLOWSHEET NOTE
Intervention Active listening;Sustaining presence/ Ministry of presence   Outcome Tearful;Grieving     Electronically signed by Marc Hopper on 12/12/2020 at 4:59 PM

## 2020-12-12 NOTE — PLAN OF CARE
Problem: Restraint Use - Nonviolent/Non-Self-Destructive Behavior:  Goal: Absence of restraint-related injury  Description: Absence of restraint-related injury  12/11/2020 2123 by Raúl Melton  Outcome: Met This Shift  12/11/2020 1253 by Pegge Nyhan, RN  Outcome: Met This Shift     Problem: Skin Integrity:  Goal: Will show no infection signs and symptoms  Description: Will show no infection signs and symptoms  12/11/2020 2123 by Raúl Melton  Outcome: Ongoing  12/11/2020 1253 by Pegge Nyhan, RN  Outcome: Ongoing  Goal: Absence of new skin breakdown  Description: Absence of new skin breakdown  12/11/2020 2123 by Raúl Melton  Outcome: Ongoing  12/11/2020 1253 by Pegge Nyhan, RN  Outcome: Ongoing     Problem: Nutrition  Goal: Optimal nutrition therapy  Description: Nutrition Problem #1: Inadequate oral intake  Intervention: Food and/or Nutrient Delivery: Continue NPO, Start Tube Feeding  Nutritional Goals: EN intake to meet % of estimated nutrition needs     12/11/2020 2123 by Raúl Melton  Outcome: Ongoing  12/11/2020 1253 by Pegge Nyhan, RN  Outcome: Ongoing     Problem: OXYGENATION/RESPIRATORY FUNCTION  Goal: Patient will maintain patent airway  12/11/2020 2123 by Raúl Melton  Outcome: Ongoing  12/11/2020 1945 by Schuyler Cleary RCP  Outcome: Ongoing  12/11/2020 1253 by Pegge Nyhan, RN  Outcome: Ongoing  12/11/2020 1142 by Shanika Lu RCP  Outcome: Ongoing  Goal: Patient will achieve/maintain normal respiratory rate/effort  Description: Respiratory rate and effort will be within normal limits for the patient  12/11/2020 2123 by Raúl Melton  Outcome: Ongoing  12/11/2020 1945 by Schuyler Cleary RCP  Outcome: Ongoing  12/11/2020 1253 by Pegge Nyhan, RN  Outcome: Ongoing  12/11/2020 1142 by Shanika Lu RCP  Outcome: Ongoing  Note:   PROVIDE ADEQUATE OXYGENATION WITH ACCEPTABLE SP02/ABG'S    [x]  IDENTIFY APPROPRIATE OXYGEN THERAPY  [x] MONITOR SP02/ABG'S AS NEEDED   [x]   PATIENT EDUCATION AS NEEDED        Problem: MECHANICAL VENTILATION  Goal: Patient will maintain patent airway  12/11/2020 2123 by Néstor Gan  Outcome: Ongoing  12/11/2020 1945 by Devante Christianson RCP  Outcome: Ongoing  12/11/2020 1253 by Deann Wyatt RN  Outcome: Ongoing  12/11/2020 1142 by Epi Hill RCP  Outcome: Ongoing  Note: MECHANICAL VENTILATION     [x]  PROVIDE OPTIMAL VENTILATION  [x]   ASSESS FOR EXTUBATION READINESS  [x]   ASSESS FOR WEANING READINESS  [x]  EXTUBATE AS TOLERATED  [x]  IMPLEMENT ADULT MECHANICAL VENTILATION PROTOCOL  [x]  MAINTAIN ADEQUATE OXYGENATION  [x]  PERFORM SPONTANEOUS WEANING TRIAL AS TOLERATED     Goal: Oral health is maintained or improved  12/11/2020 2123 by Néstor Gan  Outcome: Ongoing  12/11/2020 1945 by Devante Christianson RCP  Outcome: Ongoing  12/11/2020 1253 by Deann Wyatt RN  Outcome: Ongoing  12/11/2020 1142 by Epi Hill RCP  Outcome: Ongoing  Goal: ET tube will be managed safely  12/11/2020 2123 by Néstor Gan  Outcome: Ongoing  12/11/2020 1945 by Devante Christianson RCP  Outcome: Ongoing  12/11/2020 1253 by Deann Wyatt RN  Outcome: Ongoing  12/11/2020 1142 by Epi Hill RCP  Outcome: Ongoing  Goal: Ability to express needs and understand communication  12/11/2020 2123 by Néstor Gan  Outcome: Ongoing  12/11/2020 1945 by Devante Christianson RCP  Outcome: Ongoing  12/11/2020 1253 by Deann Wyatt RN  Outcome: Ongoing  12/11/2020 1142 by Epi Hill RCP  Outcome: Ongoing  Goal: Mobility/activity is maintained at optimum level for patient  12/11/2020 2123 by Néstor Gan  Outcome: Ongoing  12/11/2020 1945 by Devante Christianson RCP  Outcome: Ongoing  12/11/2020 1253 by Deann Wyatt RN  Outcome: Ongoing  12/11/2020 1142 by Epi Hill RCP  Outcome: Ongoing     Problem: Confusion - Acute:  Goal: Absence of continued neurological deterioration signs and symptoms  Description: Absence of continued neurological deterioration signs and symptoms  12/11/2020 2123 by Ernestina Mederos  Outcome: Ongoing  12/11/2020 1253 by Kayli Medeiros RN  Outcome: Ongoing  Goal: Mental status will be restored to baseline  Description: Mental status will be restored to baseline  12/11/2020 2123 by Ernestina Mederos  Outcome: Ongoing  12/11/2020 1253 by Kayli Medeiros RN  Outcome: Ongoing     Problem: Discharge Planning:  Goal: Ability to perform activities of daily living will improve  Description: Ability to perform activities of daily living will improve  12/11/2020 2123 by Ernestina Mederos  Outcome: Ongoing  12/11/2020 1253 by Kayli Medeiros RN  Outcome: Ongoing  Goal: Participates in care planning  Description: Participates in care planning  12/11/2020 2123 by Ernestina Mederos  Outcome: Ongoing  12/11/2020 1253 by Kayli Medeiros RN  Outcome: Ongoing     Problem: Injury - Risk of, Physical Injury:  Goal: Absence of physical injury  Description: Absence of physical injury  12/11/2020 2123 by Ernestina Mederos  Outcome: Ongoing  12/11/2020 1253 by Kayli Medeiros RN  Outcome: Ongoing  Goal: Will remain free from falls  Description: Will remain free from falls  12/11/2020 2123 by Ernestina Mederos  Outcome: Ongoing  12/11/2020 1253 by Kayli Medeiros RN  Outcome: Ongoing     Problem: Mood - Altered:  Goal: Mood stable  Description: Mood stable  12/11/2020 2123 by Ernestina Mederos  Outcome: Ongoing  12/11/2020 1253 by Kayli eMdeiros RN  Outcome: Ongoing  Goal: Absence of abusive behavior  Description: Absence of abusive behavior  12/11/2020 2123 by Ernestina Mederos  Outcome: Ongoing  12/11/2020 1253 by Kayli Medeiros RN  Outcome: Ongoing  Goal: Verbalizations of feeling emotionally comfortable while being cared for will increase  Description: Verbalizations of feeling emotionally comfortable while being cared for will increase  12/11/2020 2123 by Denice Sorensen Jeannine  Outcome: Ongoing  12/11/2020 1253 by Chevy Castillo RN  Outcome: Ongoing     Problem: Psychomotor Activity - Altered:  Goal: Absence of psychomotor disturbance signs and symptoms  Description: Absence of psychomotor disturbance signs and symptoms  12/11/2020 2123 by Michael Prost  Outcome: Ongoing  12/11/2020 1253 by Chevy Castillo RN  Outcome: Ongoing     Problem: Sensory Perception - Impaired:  Goal: Demonstrations of improved sensory functioning will increase  Description: Demonstrations of improved sensory functioning will increase  12/11/2020 2123 by Michael Prost  Outcome: Ongoing  12/11/2020 1253 by Chevy Castillo RN  Outcome: Ongoing  Goal: Decrease in sensory misperception frequency  Description: Decrease in sensory misperception frequency  12/11/2020 2123 by Michael Prost  Outcome: Ongoing  12/11/2020 1253 by Chevy Castillo RN  Outcome: Ongoing  Goal: Able to refrain from responding to false sensory perceptions  Description: Able to refrain from responding to false sensory perceptions  12/11/2020 2123 by Michael Prost  Outcome: Ongoing  12/11/2020 1253 by Chevy Castillo RN  Outcome: Ongoing  Goal: Demonstrates accurate environmental perceptions  Description: Demonstrates accurate environmental perceptions  12/11/2020 2123 by Michael Prost  Outcome: Ongoing  12/11/2020 1253 by Chevy Castillo RN  Outcome: Ongoing  Goal: Able to distinguish between reality-based and nonreality-based thinking  Description: Able to distinguish between reality-based and nonreality-based thinking  12/11/2020 2123 by Michael Prost  Outcome: Ongoing  12/11/2020 1253 by Chevy Castillo RN  Outcome: Ongoing  Goal: Able to interrupt nonreality-based thinking  Description: Able to interrupt nonreality-based thinking  12/11/2020 2123 by Michael Prost  Outcome: Ongoing  12/11/2020 1253 by Chevy Castillo RN  Outcome: Ongoing     Problem: Sleep Pattern Disturbance:  Goal: Appears well-rested  Description: Appears well-rested  12/11/2020 2123 by Zehra Elder  Outcome: Ongoing  12/11/2020 1253 by Andre Ag RN  Outcome: Ongoing     Problem: Airway Clearance - Ineffective:  Goal: Ability to maintain a clear airway will improve  Description: Ability to maintain a clear airway will improve  12/11/2020 2123 by Zehra Elder  Outcome: Ongoing  12/11/2020 1945 by Staci Lynch RCP  Outcome: Ongoing  12/11/2020 1253 by Andre Ag RN  Outcome: Ongoing     Problem: Anxiety/Stress:  Goal: Level of anxiety will decrease  Description: Level of anxiety will decrease  12/11/2020 2123 by Zehra Elder  Outcome: Ongoing  12/11/2020 1253 by Andre Ag RN  Outcome: Ongoing     Problem: Aspiration:  Goal: Absence of aspiration  Description: Absence of aspiration  12/11/2020 2123 by Zehra Elder  Outcome: Ongoing  12/11/2020 1253 by Andre Ag RN  Outcome: Ongoing     Problem: Cardiac Output - Decreased:  Goal: Hemodynamic stability will improve  Description: Hemodynamic stability will improve  12/11/2020 2123 by Zehra Elder  Outcome: Ongoing  12/11/2020 1253 by Andre Ag RN  Outcome: Ongoing     Problem: Fluid Volume - Imbalance:  Goal: Absence of imbalanced fluid volume signs and symptoms  Description: Absence of imbalanced fluid volume signs and symptoms  12/11/2020 2123 by Zehra Elder  Outcome: Ongoing  12/11/2020 1253 by Andre Ag RN  Outcome: Ongoing     Problem: Gas Exchange - Impaired:  Goal: Levels of oxygenation will improve  Description: Levels of oxygenation will improve  12/11/2020 2123 by Zehra Elder  Outcome: Ongoing  12/11/2020 1253 by Andre Ag RN  Outcome: Ongoing  12/11/2020 1142 by Rajni Luther RCP  Outcome: Ongoing     Problem: Nutrition Deficit:  Goal: Ability to achieve adequate nutritional intake will improve  Description: Ability to achieve adequate nutritional intake will improve  12/11/2020 2123 by Néstor Gan  Outcome: Ongoing  12/11/2020 1253 by Deann Wyatt RN  Outcome: Ongoing     Problem: Pain:  Goal: Pain level will decrease  Description: Pain level will decrease  12/11/2020 2123 by Néstor Gan  Outcome: Ongoing  12/11/2020 1253 by Deann Wyatt RN  Outcome: Ongoing  Goal: Recognizes and communicates pain  Description: Recognizes and communicates pain  12/11/2020 2123 by Néstor Gan  Outcome: Ongoing  12/11/2020 1253 by Deann Wyatt RN  Outcome: Ongoing  Goal: Control of acute pain  Description: Control of acute pain  12/11/2020 2123 by Néstor Gan  Outcome: Ongoing  12/11/2020 1253 by Deann Wyatt RN  Outcome: Ongoing     Problem: Serum Glucose Level - Abnormal:  Goal: Ability to maintain appropriate glucose levels will improve to within specified parameters  Description: Ability to maintain appropriate glucose levels will improve to within specified parameters  12/11/2020 2123 by Néstor Gan  Outcome: Ongoing  12/11/2020 1253 by Deann Wyatt RN  Outcome: Ongoing     Problem: Skin Integrity - Impaired:  Goal: Will show no infection signs and symptoms  Description: Will show no infection signs and symptoms  12/11/2020 2123 by Néstor Gan  Outcome: Ongoing  12/11/2020 1253 by Deann Wyatt RN  Outcome: Ongoing  Goal: Absence of new skin breakdown  Description: Absence of new skin breakdown  12/11/2020 2123 by Néstor Gan  Outcome: Ongoing  12/11/2020 1253 by Deann Wyatt RN  Outcome: Ongoing     Problem: Tissue Perfusion, Altered:  Goal: Circulatory function within specified parameters  Description: Circulatory function within specified parameters  12/11/2020 2123 by Néstor Gan  Outcome: Ongoing  12/11/2020 1253 by Deann Wyatt RN  Outcome: Ongoing     Problem: Tissue Perfusion - Cardiopulmonary, Altered:  Goal: Absence of angina  Description: Absence of angina  12/11/2020 2123 by Néstor Gan  Outcome: Ongoing  12/11/2020 1253 by Carloz Moreno RN  Outcome: Ongoing  Goal: Hemodynamic stability will improve  Description: Hemodynamic stability will improve  12/11/2020 2123 by Jeff Singer  Outcome: Ongoing  12/11/2020 1253 by Carloz Moreno RN  Outcome: Ongoing     Problem: Restraint Use - Nonviolent/Non-Self-Destructive Behavior:  Goal: Absence of restraint indications  Description: Absence of restraint indications  12/11/2020 2123 by Jeff Singer  Outcome: Not Met This Shift  12/11/2020 1253 by aCrloz Moreno RN  Outcome: Not Met This Shift

## 2020-12-14 LAB
HISTOPLASMA ABS, ID: NORMAL
HISTOPLASMA ABS, ID: NORMAL
HISTOPLASMA ANTIBODY MYCELIAL CF: NORMAL
HISTOPLASMA ANTIBODY MYCELIAL CF: NORMAL
HISTOPLASMA ANTIBODY YEAST CF: NORMAL
HISTOPLASMA ANTIBODY YEAST CF: NORMAL
HISTOPLASMA GALACTOMANNAN AG, URINE: NOT DETECTED NG/ML
INTERPRETATION: NOT DETECTED

## 2020-12-15 ENCOUNTER — TELEPHONE (OUTPATIENT)
Dept: PULMONOLOGY | Age: 35
End: 2020-12-15

## 2020-12-15 LAB
HYDROXYITRACONAZOLE LEVEL: <0.1 UG/ML
ITRACONAZOLE LVL: <0.1 UG/ML

## 2020-12-15 NOTE — TELEPHONE ENCOUNTER
Death certificate was emailed back to  home, scanned into chart and the original is at the  of the office.

## 2020-12-15 NOTE — TELEPHONE ENCOUNTER
home called and asked If Dr. Sebastian Steinberg would sign. I let them know Dr. Sebastian Steinberg was out and that Dr Ofelia Ortiz would sign, I put in Dr. Gonsalves Filler box to sign.

## 2020-12-17 LAB
CULTURE: NORMAL
CULTURE: NORMAL
Lab: NORMAL
Lab: NORMAL
SPECIMEN DESCRIPTION: NORMAL
SPECIMEN DESCRIPTION: NORMAL

## 2025-03-02 NOTE — PROGRESS NOTES
Renal Progress Note    Patient :  Joel Shaver; 37 y.o. MRN# [id number]  Location:  0105/0105-01  Attending:  Matias Cervantes MD  Admit Date:  12/2/2020   Hospital Day: 7    Subjective:     Patient  examined at bedside. Chart reviewed. He had fevers overnight up to 101. His blood cultures-1 bottle positive for coagulase-negative staph and the other 1 with gram-positive cocci in clusters. He continues to be on vancomycin and Zosyn. Now requiring pressor support with norepinephrine to 2 mics. Left lower extremity appears to be swollen and slightly erythematous with erythema extending up to the pannus. He is on a heparin drip as his DVT scans have not resulted as yet. His vent settings continue to be high although little improved from previous since the dose of Lasix. Is currently on FiO2 of 90% with propofol for sedation. His vent settings are PRVC/20/530/20. His liver enzymes were slightly elevated and he got an ultrasound of the liver. Tube feeds continue at goal rate. Also on free water 400 mL 3 times daily. Urine output in the last 24 hours has been 2.2 L. Currently having around 200 cc an hour. Creatinine up to 2.14. Hypernatremia resolved. Insensible losses continue to be high. Vancomycin for treatment of left lower extremity cellulitis which extended up to the abdominal wall area. Johnson catheter in place. Unresponsive, sedated. Outpatient Medications:     No medications prior to admission.     Current Medications:     Scheduled Meds:    vancomycin (VANCOCIN) intermittent dosing (placeholder)   Other RX Placeholder    vancomycin  1,500 mg Intravenous Once    midodrine  10 mg Oral 4x Daily    piperacillin-tazobactam  3.375 g Intravenous Q8H    lidocaine PF  5 mL Nebulization Once    lansoprazole  30 mg Per NG tube QAM AC    sodium chloride flush  10 mL Intravenous 2 times per day     Continuous Infusions:    norepinephrine 3 mcg/min (12/09/20 0921)    heparin (PORCINE) Value Date    PROT 7.6 12/09/2020     Urinalysis/Chemistries:      Lab Results   Component Value Date    NITRU NEGATIVE 12/02/2020    COLORU DARK YELLOW 12/02/2020    PHUR 5.0 12/02/2020    WBCUA 5 TO 10 12/02/2020    RBCUA 0 TO 2 12/02/2020    MUCUS NOT REPORTED 12/02/2020    TRICHOMONAS NOT REPORTED 12/02/2020    YEAST NOT REPORTED 12/02/2020    BACTERIA NOT REPORTED 12/02/2020    SPECGRAV 1.021 12/02/2020    LEUKOCYTESUR SMALL 12/02/2020    UROBILINOGEN Normal 12/02/2020    BILIRUBINUR NEGATIVE 12/02/2020    GLUCOSEU NEGATIVE 12/02/2020    KETUA TRACE 12/02/2020    AMORPHOUS NOT REPORTED 12/02/2020     Urine Sodium:     Lab Results   Component Value Date    MARCELINO 20 12/03/2020     Urine Osmolarity:   Lab Results   Component Value Date    OSMOU 408 12/03/2020     Urine Creatinine:     Lab Results   Component Value Date    LABCREA 126.7 12/03/2020     Radiology:     Reviewed     Assessment:     1. Acute Kidney Injury: Secondary to ischemic ATN from intravascular volume depletion from insensible losses, systemic inflammatory response syndrome, hypotension requiring pressor support, low flow, along with some underlying medical toxicity, did have elevated levels. Baseline not known, creatinine presentation 1.3 now up to 2.14 mg/dl. 2.  Persistent systemic inflammatory response syndrome/septic shock likely from lower extremity cellulitis in the left side  3. Persistent respiratory acidosis and hypoxia  4. Respiratory failure on the ventilator, continues to require high FiO2  5. Hypovolemic hypernatremia, improved. 6.  Hypotension requiring pressor support. 7.  Morbid obesity  8. Left lower extremity cellulitis. .4    Plan:   1. Continue free water to 400 mL every 8 and tube feeds. 2. Continue monitor strict I's and O's and renal function. 3. Will monitor off diuretics currently as he has good urine output. 4. Continue ProAmatine 10, 3 times a day. Wean off vasopressors as tolerated.   5. Antibiotics as per renal function. 6. Check BMP daily. 7. Prognosis guarded. 8. Will follow. Nutrition   Please ensure that patient is on a renal diet/TF. Avoid nephrotoxic drugs/contrast exposure. We will continue to follow along with you. Jaene Grace M.D. Internal Medicine Resident , PGY-3  [address] [facility]  12/09/20 9:39 AM      Attending Physician Statement  I have discussed the care of this patient, including pertinent history and exam findings, with the Resident/CNP. I have reviewed and edited the key elements of all parts of the encounter with the Resident/CNP. I agree with the assessment, plan and orders as documented by the Resident/CNP. Gio Lopez MD   Nephrology [address] [place] [address] Drive    This note is created with the assistance of a speech-recognition program. While intending to generate a document that actually reflects the content of the visit, no guarantees can be provided that every mistake has been identified and corrected by editing. Yes